# Patient Record
Sex: FEMALE | Employment: UNEMPLOYED | URBAN - METROPOLITAN AREA
[De-identification: names, ages, dates, MRNs, and addresses within clinical notes are randomized per-mention and may not be internally consistent; named-entity substitution may affect disease eponyms.]

---

## 2017-03-02 ENCOUNTER — OFFICE VISIT (OUTPATIENT)
Dept: PEDIATRICS | Facility: CLINIC | Age: 57
End: 2017-03-02
Payer: COMMERCIAL

## 2017-03-02 VITALS
OXYGEN SATURATION: 98 % | HEART RATE: 76 BPM | WEIGHT: 256 LBS | TEMPERATURE: 98.6 F | DIASTOLIC BLOOD PRESSURE: 82 MMHG | HEIGHT: 66 IN | BODY MASS INDEX: 41.14 KG/M2 | SYSTOLIC BLOOD PRESSURE: 118 MMHG

## 2017-03-02 DIAGNOSIS — J30.9 ALLERGIC RHINITIS, UNSPECIFIED ALLERGIC RHINITIS TRIGGER, UNSPECIFIED RHINITIS SEASONALITY: ICD-10-CM

## 2017-03-02 DIAGNOSIS — E66.01 MORBID OBESITY, UNSPECIFIED OBESITY TYPE (H): ICD-10-CM

## 2017-03-02 DIAGNOSIS — J45.30 MILD PERSISTENT ASTHMA WITHOUT COMPLICATION: ICD-10-CM

## 2017-03-02 DIAGNOSIS — K21.9 GASTROESOPHAGEAL REFLUX DISEASE, ESOPHAGITIS PRESENCE NOT SPECIFIED: ICD-10-CM

## 2017-03-02 DIAGNOSIS — K43.9 VENTRAL HERNIA WITHOUT OBSTRUCTION OR GANGRENE: ICD-10-CM

## 2017-03-02 DIAGNOSIS — E55.9 VITAMIN D DEFICIENCY: ICD-10-CM

## 2017-03-02 DIAGNOSIS — L50.9 URTICARIA: ICD-10-CM

## 2017-03-02 DIAGNOSIS — Z00.00 ROUTINE GENERAL MEDICAL EXAMINATION AT A HEALTH CARE FACILITY: Primary | ICD-10-CM

## 2017-03-02 DIAGNOSIS — R11.0 NAUSEA: ICD-10-CM

## 2017-03-02 DIAGNOSIS — I49.3 PVC'S (PREMATURE VENTRICULAR CONTRACTIONS): ICD-10-CM

## 2017-03-02 DIAGNOSIS — Z91.018 ALLERGY, FOOD: ICD-10-CM

## 2017-03-02 LAB
ALBUMIN SERPL-MCNC: 3.5 G/DL (ref 3.4–5)
ALP SERPL-CCNC: 95 U/L (ref 40–150)
ALT SERPL W P-5'-P-CCNC: 30 U/L (ref 0–50)
ANION GAP SERPL CALCULATED.3IONS-SCNC: 7 MMOL/L (ref 3–14)
AST SERPL W P-5'-P-CCNC: 15 U/L (ref 0–45)
BILIRUB SERPL-MCNC: 0.6 MG/DL (ref 0.2–1.3)
BUN SERPL-MCNC: 12 MG/DL (ref 7–30)
CALCIUM SERPL-MCNC: 9.4 MG/DL (ref 8.5–10.1)
CHLORIDE SERPL-SCNC: 106 MMOL/L (ref 94–109)
CHOLEST SERPL-MCNC: 165 MG/DL
CO2 SERPL-SCNC: 28 MMOL/L (ref 20–32)
CORTIS SERPL-MCNC: 6 UG/DL (ref 4–22)
CREAT SERPL-MCNC: 0.73 MG/DL (ref 0.52–1.04)
DEPRECATED CALCIDIOL+CALCIFEROL SERPL-MC: 55 UG/L (ref 20–75)
ERYTHROCYTE [DISTWIDTH] IN BLOOD BY AUTOMATED COUNT: 13 % (ref 10–15)
GFR SERPL CREATININE-BSD FRML MDRD: 82 ML/MIN/1.7M2
GLUCOSE SERPL-MCNC: 106 MG/DL (ref 70–99)
HBA1C MFR BLD: 5.3 % (ref 4.3–6)
HCT VFR BLD AUTO: 39.8 % (ref 35–47)
HCV AB SERPL QL IA: NORMAL
HDLC SERPL-MCNC: 35 MG/DL
HGB BLD-MCNC: 12.9 G/DL (ref 11.7–15.7)
LDLC SERPL CALC-MCNC: 108 MG/DL
MCH RBC QN AUTO: 27 PG (ref 26.5–33)
MCHC RBC AUTO-ENTMCNC: 32.4 G/DL (ref 31.5–36.5)
MCV RBC AUTO: 83 FL (ref 78–100)
NONHDLC SERPL-MCNC: 130 MG/DL
PLATELET # BLD AUTO: 276 10E9/L (ref 150–450)
POTASSIUM SERPL-SCNC: 4 MMOL/L (ref 3.4–5.3)
PROT SERPL-MCNC: 7.2 G/DL (ref 6.8–8.8)
RBC # BLD AUTO: 4.78 10E12/L (ref 3.8–5.2)
SODIUM SERPL-SCNC: 141 MMOL/L (ref 133–144)
TRIGL SERPL-MCNC: 111 MG/DL
TSH SERPL DL<=0.005 MIU/L-ACNC: 2 MU/L (ref 0.4–4)
WBC # BLD AUTO: 6.4 10E9/L (ref 4–11)

## 2017-03-02 PROCEDURE — 83036 HEMOGLOBIN GLYCOSYLATED A1C: CPT | Performed by: PEDIATRICS

## 2017-03-02 PROCEDURE — 80053 COMPREHEN METABOLIC PANEL: CPT | Performed by: PEDIATRICS

## 2017-03-02 PROCEDURE — 85027 COMPLETE CBC AUTOMATED: CPT | Performed by: PEDIATRICS

## 2017-03-02 PROCEDURE — 86803 HEPATITIS C AB TEST: CPT | Performed by: PEDIATRICS

## 2017-03-02 PROCEDURE — 99213 OFFICE O/P EST LOW 20 MIN: CPT | Mod: 25 | Performed by: PEDIATRICS

## 2017-03-02 PROCEDURE — 82306 VITAMIN D 25 HYDROXY: CPT | Performed by: PEDIATRICS

## 2017-03-02 PROCEDURE — 99396 PREV VISIT EST AGE 40-64: CPT | Performed by: PEDIATRICS

## 2017-03-02 PROCEDURE — 80061 LIPID PANEL: CPT | Performed by: PEDIATRICS

## 2017-03-02 PROCEDURE — 84443 ASSAY THYROID STIM HORMONE: CPT | Performed by: PEDIATRICS

## 2017-03-02 PROCEDURE — 82533 TOTAL CORTISOL: CPT | Performed by: PEDIATRICS

## 2017-03-02 PROCEDURE — 36415 COLL VENOUS BLD VENIPUNCTURE: CPT | Performed by: PEDIATRICS

## 2017-03-02 RX ORDER — ONDANSETRON 4 MG/1
4 TABLET, ORALLY DISINTEGRATING ORAL EVERY 6 HOURS PRN
Qty: 20 TABLET | Refills: 11 | Status: SHIPPED | OUTPATIENT
Start: 2017-03-02 | End: 2018-03-12

## 2017-03-02 RX ORDER — DILTIAZEM HYDROCHLORIDE 180 MG/1
180 CAPSULE, EXTENDED RELEASE ORAL DAILY
Qty: 30 CAPSULE | Refills: 5 | Status: SHIPPED | OUTPATIENT
Start: 2017-03-02 | End: 2017-05-01

## 2017-03-02 RX ORDER — ALBUTEROL SULFATE 0.83 MG/ML
1 SOLUTION RESPIRATORY (INHALATION) EVERY 4 HOURS PRN
Qty: 90 VIAL | Refills: 3 | Status: SHIPPED | OUTPATIENT
Start: 2017-03-02 | End: 2019-03-14

## 2017-03-02 NOTE — MR AVS SNAPSHOT
After Visit Summary   3/2/2017    Stephanie Mendez    MRN: [id number]           Patient Information     Date Of Birth          1960        Visit Information        Provider Department      3/2/2017 8:40 AM Nancy Urban MD [facility] [place]        Today's Diagnoses     Routine general medical examination at a health care facility    -  1    Mild persistent asthma without complication        Allergic rhinitis, unspecified allergic rhinitis trigger, unspecified rhinitis seasonality        Gastroesophageal reflux disease, esophagitis presence not specified        Urticaria        Morbid obesity, unspecified obesity type (H)        PVC's (premature ventricular contractions)        Vitamin D deficiency        Ventral hernia without obstruction or gangrene        Nausea        Mild persistent asthma with exacerbation          Care Instructions    Lab work today - I will send you the results through [employer] when I get them    Call for visit with Dr Young    Switch to diltiazem or PVC's.  Stop metoprolol.  Please keep me posted with how you are doing.      Tetanus shot due this year when it is convenient for you        Follow-ups after your visit        Additional Services     ENDOCRINOLOGY ADULT REFERRAL       Your provider has referred you to: [facility]: [facility] [place] [phone]   [email or web address]      Please be aware that coverage of these services is subject to the terms and limitations of your health insurance plan.  Call member services at your health plan with any benefit or coverage questions.      Please bring the following to your appointment:    >>   Any x-rays, CTs or MRIs which have been performed.  Contact the facility where they were done to arrange for  prior to your scheduled appointment.    >>   List of current medications   >>   This referral request   >>   Any documents/labs given to you for this referral                 "  Your next 10 appointments already scheduled     May 01, 2017 12:30 PM CDT   Return Visit with LEWIS Ho CNP   [facility] [place] ([facility])    [address] [place], [place] [id number]  [facility] [address] [phone]              Who to contact     If you have questions or need follow up information about today's clinic visit or your schedule please contact [facility] [place] directly at [phone].  Normal or non-critical lab and imaging results will be communicated to you by [email or web address], letter or phone within 4 business days after the clinic has received the results. If you do not hear from us within 7 days, please contact the clinic through [employer] or phone. If you have a critical or abnormal lab result, we will notify you by phone as soon as possible.  Submit refill requests through [employer] or call your pharmacy and they will forward the refill request to us. Please allow 3 business days for your refill to be completed.          Additional Information About Your Visit        [email or web address] Information     [employer] gives you secure access to your electronic health record. If you see a primary care provider, you can also send messages to your care team and make appointments. If you have questions, please call your primary care clinic.  If you do not have a primary care provider, please call [phone] and they will assist you.        Care EveryWhere ID     This is your Care EveryWhere ID. This could be used by other organizations to access your [facility] medical records  [id number]        Your Vitals Were     Pulse Temperature Height Last Period Pulse Oximetry BMI (Body Mass Index)    76 98.6  F (37  C) (Tympanic) 5' 5.5\" (1.664 m) 06/04/2010 98% 41.95 kg/m2       Blood Pressure from Last 3 Encounters:   03/02/17 118/82   12/16/16 126/88   09/29/16 148/73    Weight from Last 3 Encounters:   03/02/17 256 lb (116.1 kg)   12/16/16 256 lb (116.1 kg)   09/29/16 247 lb " (112 kg)              We Performed the Following     CBC with platelets     Comprehensive metabolic panel     Cortisol     DEPRESSION ACTION PLAN (DAP)     ENDOCRINOLOGY ADULT REFERRAL     Hemoglobin A1c     Hepatitis C Screen Reflex to HCV RNA Quant and Genotype     Lipid panel reflex to direct LDL     TSH with free T4 reflex     Vitamin D Deficiency          Today's Medication Changes          These changes are accurate as of: 3/2/17  9:26 AM.  If you have any questions, ask your nurse or doctor.               Start taking these medicines.        Dose/Directions    diltiazem 180 MG 24 hr capsule   Commonly known as:  DILACOR XR   Used for:  PVC's (premature ventricular contractions)   Started by:  Nancy Urban MD        Dose:  180 mg   Take 1 capsule (180 mg) by mouth daily   Quantity:  30 capsule   Refills:  5         Stop taking these medicines if you haven't already. Please contact your care team if you have questions.     metoprolol 50 MG 24 hr tablet   Commonly known as:  TOPROL XL   Stopped by:  Nancy Urban MD                Where to get your medicines      These medications were sent to [facility]/pharmacy #[id number] - [place], [place] - [address] AT [place] [address], [place] [address]     Phone:  [phone]     albuterol (2.5 MG/3ML) 0.083% neb solution    diltiazem 180 MG 24 hr capsule    ondansetron 4 MG ODT tab                Primary Care Provider Office Phone # Fax #    Nancy Urban [phone]       [address] [place] [address]        Thank you!     Thank you for choosing [facility]  for your care. Our goal is always to provide you with excellent care. Hearing back from our patients is one way we can continue to improve our services. Please take a few minutes to complete the written survey that you may receive in the mail after your visit with us. Thank you!             Your  Updated Medication List - Protect others around you: Learn how to safely use, store and throw away your medicines at www.disposemymeds.org.          This list is accurate as of: 3/2/17  9:26 AM.  Always use your most recent med list.                   Brand Name Dispense Instructions for use    acetaminophen 500 MG tablet    TYLENOL    100 tablet    Take 1 tablet (500 mg) by mouth every 6 hours as needed for mild pain       albuterol (2.5 MG/3ML) 0.083% neb solution     90 vial    Take 1 vial (2.5 mg) by nebulization every 4 hours as needed for shortness of breath / dyspnea or wheezing       albuterol 90 MCG/ACT inhaler     1    1-2 puffs Q 4-6 hrs prn       BENADRYL 25 MG capsule   Generic drug:  diphenhydrAMINE      Take 25 mg by mouth every 6 hours as needed.       CALCIUM + D PO      600mg bid       CENTRUM Tabs      1 TABLET DAILY       diltiazem 180 MG 24 hr capsule    DILACOR XR    30 capsule    Take 1 capsule (180 mg) by mouth daily       doxepin 10 MG capsule    SINEquan     2 tablets twice daily       EPI E-Z PEN REMEDIOS 1:1000  IJ     2    1 TIME ONLY       ibuprofen 600 MG tablet    ADVIL/MOTRIN    30 tablet    Take 1 tablet by mouth every 6 hours as needed for other (inflammatory pain).       IRON RELEASE TBCR 160 MG OR      1 TABLET DAILY       omeprazole 20 MG CR capsule    priLOSEC    180 capsule    Take 1 capsule (20 mg) by mouth 2 times daily       ondansetron 4 MG ODT tab    ZOFRAN-ODT    20 tablet    Take 1 tablet (4 mg) by mouth every 6 hours as needed for nausea       PATANOL 0.1 % ophthalmic solution   Generic drug:  olopatadine      Place 2 drops into both eyes daily as needed       ranitidine 150 MG tablet    ZANTAC    3 MONTHS    ONE TABLET TWICE DAILY       SINGULAIR 10 MG tablet   Generic drug:  montelukast      Take 10 mg by mouth daily       Vitamin D (Cholecalciferol) 1000 UNITS Tabs      Take by mouth daily       XOLAIR SC      Twice per month

## 2017-03-02 NOTE — LETTER
My Depression Action Plan  Name: Stephanie Mendez   Date of Birth 1960  Date: 3/2/2017    My doctor: Nancy Urban   My clinic: [address]  [address]  [facility] [address] [phone]          GREEN    ZONE   Good Control    What it looks like:     Things are going generally well. You have normal up s and down s. You may even feel depressed from time to time, but bad moods usually last less than a day.   What you need to do:  1. Continue to care for yourself (see self care plan)  2. Check your depression survival kit and update it as needed  3. Follow your physician s recommendations including any medication.  4. Do not stop taking medication unless you consult with your physician first.           YELLOW         ZONE Getting Worse    What it looks like:     Depression is starting to interfere with your life.     It may be hard to get out of bed; you may be starting to isolate yourself from others.    Symptoms of depression are starting to last most all day and this has happened for several days.     You may have suicidal thoughts but they are not constant.   What you need to do:     1. Call your care team, your response to treatment will improve if you keep your care team informed of your progress. Yellow periods are signs an adjustment may need to be made.     2. Continue your self-care, even if you have to fake it!    3. Talk to someone in your support network    4. Open up your depression survival kit           RED    ZONE Medical Alert - Get Help    What it looks like:     Depression is seriously interfering with your life.     You may experience these or other symptoms: You can t get out of bed most days, can t work or engage in other necessary activities, you have trouble taking care of basic hygiene, or basic responsibilities, thoughts of suicide or death that will not go away, self-injurious behavior.     What you need to do:  1. Call  your care team and request a same-day appointment. If they are not available (weekends or after hours) call your local crisis line, emergency room or 911.      Electronically signed by: Ngozi Carrera, March 2, 2017    Depression Self Care Plan / Survival Kit    Self-Care for Depression  Here s the deal. Your body and mind are really not as separate as most people think.  What you do and think affects how you feel and how you feel influences what you do and think. This means if you do things that people who feel good do, it will help you feel better.  Sometimes this is all it takes.  There is also a place for medication and therapy depending on how severe your depression is, so be sure to consult with your medical provider and/ or Behavioral Health Consultant if your symptoms are worsening or not improving.     In order to better manage my stress, I will:    Exercise  Get some form of exercise, every day. This will help reduce pain and release endorphins, the  feel good  chemicals in your brain. This is almost as good as taking antidepressants!  This is not the same as joining a gym and then never going! (they count on that by the way ) It can be as simple as just going for a walk or doing some gardening, anything that will get you moving.      Hygiene   Maintain good hygiene (Get out of bed in the morning, Make your bed, Brush your teeth, Take a shower, and Get dressed like you were going to work, even if you are unemployed).  If your clothes don't fit try to get ones that do.    Diet  I will strive to eat foods that are good for me, drink plenty of water, and avoid excessive sugar, caffeine, alcohol, and other mood-altering substances.  Some foods that are helpful in depression are: complex carbohydrates, B vitamins, flaxseed, fish or fish oil, fresh fruits and vegetables.    Psychotherapy  I agree to participate in Individual Therapy (if recommended).    Medication  If prescribed medications, I agree  to take them.  Missing doses can result in serious side effects.  I understand that drinking alcohol, or other illicit drug use, may cause potential side effects.  I will not stop my medication abruptly without first discussing it with my provider.    Staying Connected With Others  I will stay in touch with my friends, family members, and my primary care provider/team.    Use your imagination  Be creative.  We all have a creative side; it doesn t matter if it s oil painting, sand castles, or mud pies! This will also kick up the endorphins.    Witness Beauty  (AKA stop and smell the roses) Take a look outside, even in mid-winter. Notice colors, textures. Watch the squirrels and birds.     Service to others  Be of service to others.  There is always someone else in need.  By helping others we can  get out of ourselves  and remember the really important things.  This also provides opportunities for practicing all the other parts of the program.    Humor  Laugh and be silly!  Adjust your TV habits for less news and crime-drama and more comedy.    Control your stress  Try breathing deep, massage therapy, biofeedback, and meditation. Find time to relax each day.     My support system    Clinic Contact:  Phone number:    Contact 1:  Phone number:    Contact 2:  Phone number:    Yarsani/:  Phone number:    Therapist:  Phone number:    Local crisis center:    Phone number:    Other community support:  Phone number:

## 2017-03-02 NOTE — NURSING NOTE
"Chief Complaint   Patient presents with     Physical       Initial /82 (BP Location: Right arm, Patient Position: Chair, Cuff Size: Adult Large)  Pulse 76  Temp 98.6  F (37  C) (Tympanic)  Ht 5' 5.5\" (1.664 m)  Wt 256 lb (116.1 kg)  LMP 06/04/2010  SpO2 98%  BMI 41.95 kg/m2 Estimated body mass index is 41.95 kg/(m^2) as calculated from the following:    Height as of this encounter: 5' 5.5\" (1.664 m).    Weight as of this encounter: 256 lb (116.1 kg).  Medication Reconciliation: complete  "

## 2017-03-02 NOTE — PROGRESS NOTES
SUBJECTIVE:     CC: Stephanie Mendez is an 56 year old woman who presents for preventive health visit.     Healthy Habits:    Do you get at least three servings of calcium containing foods daily (dairy, green leafy vegetables, etc.)? yes    Amount of exercise or daily activities, outside of work: 3 day(s) per week    Problems taking medications regularly No    Medication side effects: No    Have you had an eye exam in the past two years? yes    Do you see a dentist twice per year? yes    Do you have sleep apnea, excessive snoring or daytime drowsiness?no      - weight     Today's PHQ-2 Score:   PHQ-2 ( 1999 [employer]) 3/2/2017 3/7/2016   Q1: Little interest or pleasure in doing things 0 0   Q2: Feeling down, depressed or hopeless 0 0   PHQ-2 Score 0 0       Abuse: Current or Past(Physical, Sexual or Emotional)- No  Do you feel safe in your environment - Yes    Social History   Substance Use Topics     Smoking status: Never Smoker     Smokeless tobacco: Never Used     Alcohol use No      Comment: RARE     The patient does not drink >3 drinks per day nor >7 drinks per week.    Recent Labs   Lab Test  11/19/15   0853  01/14/14   0956  01/28/13   1339   CHOL  163  154  179   HDL  36*  32*  43*   LDL  106*  100  116   TRIG  103  107  101   CHOLHDLRATIO   --   4.8  4.2   NHDL  127   --    --        Reviewed orders with patient.  Reviewed health maintenance and updated orders accordingly - Yes    Mammo Decision Support:  Patient over age 50, mutual decision to screen reflected in health maintenance.    Pertinent mammograms are reviewed under the imaging tab.  History of abnormal Pap smear: Status post benign hysterectomy. Health Maintenance and Surgical History updated.    Reviewed and updated as needed this visit by clinical staff  Tobacco  Allergies  Meds  Med Hx  Surg Hx  Fam Hx  Soc Hx        Reviewed and updated as needed this visit by Provider        Chronic urticaria, angioedema, and hx of anaphylaxis -  multiple triggers - gets hives and swelling in lower right leg - strong family history.  Diagnosed at age 18. Followed closely by Dr Mercado.    Carries epi pen     Can't eat fresh fruit, vegetables, onion, chicken, basil, cod, sunflower seeds    Regular diet - cornflakes, cow's milk, kid's choice bread (can only have white), turkey or hot dogs, applesauce, skinny cow ice cream snack, dinner - a protein and cooked veggie (can sometimes tolerate frozen carrots or green beans)    Vitamin D - taking 3400 daily - hx of deficiency.    PVCs - followed by cardiology - last saw Dr Pierre in December.  Feels that PVCs are getting more noticeable.  No new cardiac symptoms - denies chest pain, dizziness, changes in swelling (mostly related to urticaria).   Worried that her epinephrine won't be effective if she is on metoprolol.  Interested in trial of CCB as mentioned by Dr Pierre at last visit.    Has been trying to lose weight - worried about obesity.  Diet limited due to multiple food allergies.  Has elliptical machine and exercise ball at home.  Enjoys walking.  Caregiving for her mother and her two adult autistic children - high stress level, but handles it fairly well.  Is able to leave the boys unsupervised for short periods of time.      Still improving after extensive abdominal surgery for removal of hepatic mass over one year ago.  Scar no longer tender - affording her more exercise choices.    Asthma - has been under reasonable control on current regimen.    GERD - requires PPI twice daily to control symptoms.    Ventral hernia - known - no symptoms to suggest incarceration or bowel obstruction.    Intermittent nausea - treats with zofran.    Small amount of colon left after past excisions - gets diarrhea often when reacting to food.  UTD on colon cancer screenings - now requires only sigmoidoscopy due to past excisions.       ROS: 10 point ROS neg other than the symptoms noted above in the HPI.      Problem list,  "Medication list, Allergies, and Medical/Social/Surgical histories reviewed in [facility] and updated as appropriate.  OBJECTIVE:     /82 (BP Location: Right arm, Patient Position: Chair, Cuff Size: Adult Large)  Pulse 76  Temp 98.6  F (37  C) (Tympanic)  Ht 5' 5.5\" (1.664 m)  Wt 256 lb (116.1 kg)  LMP 06/04/2010  SpO2 98%  BMI 41.95 kg/m2  EXAM:  GENERAL: healthy, alert and no distress  EYES: Eyes grossly normal to inspection, PERRL and conjunctivae and sclerae normal  HENT: ear canals and TM's normal, nose and mouth without ulcers or lesions  NECK: no adenopathy, no asymmetry, masses, or scars and thyroid normal to palpation  RESP: lungs clear to auscultation - no rales, rhonchi or wheezes  BREAST: normal without masses, tenderness or nipple discharge and no palpable axillary masses or adenopathy  CV: regular rates and rhythm, normal S1 S2, no S3 or S4, grade 2/6 systolic murmur heard best over the LUSB and peripheral pulses strong  ABDOMEN: +BS, soft, well healed large incision site, no masses, nontender, nondistended  MS: no gross musculoskeletal defects noted, no edema  SKIN: scattered hives, swollen skin right lower shin  NEURO: Normal strength and tone, mentation intact and speech normal  PSYCH: mentation appears normal, affect normal/bright    ASSESSMENT/PLAN:         ICD-10-CM    1. Routine general medical examination at a health care facility Z00.00 CBC with platelets     TSH with free T4 reflex     Comprehensive metabolic panel     Lipid panel reflex to direct LDL     Hepatitis C Screen Reflex to HCV RNA Quant and Genotype   2. Morbid obesity, unspecified obesity type (H) E66.01 Cortisol     Vitamin D Deficiency     Hemoglobin A1c     ENDOCRINOLOGY ADULT REFERRAL    Recommended visit with endocrinology to discuss possible use of medications to treat obesity.  Patient's diet is limited due to numerous allergies, she is continuing to work on increasing activity - improved now that her residual abdominal " discomfort after surgery has resolved   3. PVC's (premature ventricular contractions) I49.3 diltiazem (DILACOR XR) 180 MG 24 hr capsule  Symptomatic.   Has been followed by cardiology - discussed changing to CCB from BB due to concerns about her asthma/allergies/anaphylaxis history.  Plan to transition to diltiazem from metoprolol.  Patient to keep me updated with how this is going - plans to dose at night as PVC awareness is higher in evening/night hours   4. Mild persistent asthma without complication J45.30 albuterol (2.5 MG/3ML) 0.083% neb solution  Continue current chronic treatment plan as recommended by Dr Mercado   5. Allergic rhinitis, unspecified allergic rhinitis trigger, unspecified rhinitis seasonality J30.9 Continue current allergy plan   6. Gastroesophageal reflux disease, esophagitis presence not specified K21.9 Continue PPI - requires BID dosing    7. Urticaria L50.9 glucagon 1 MG kit - prescribed by Dr Mercado.  Severe chronic idiopathic urticaria - some triggers identified - still discovering others   8. Vitamin D deficiency E55.9 Recheck level today   9. Ventral hernia without obstruction or gangrene K43.9 ondansetron (ZOFRAN-ODT) 4 MG ODT tab   10. Nausea R11.0 ondansetron (ZOFRAN-ODT) 4 MG ODT tab  Well controlled, continue current medications     11. Allergy, food Z91.018 Multiple allergies       COUNSELING:   Special attention given to:        Regular exercise       Healthy diet/nutrition       Vision screening       Osteoporosis Prevention/Bone Health       Colon cancer screening       Consider Hep C screening for patients born between 1945 and 1965       (Grecia)menopause management       The 10-year ASCVD risk score (Tranmiryam IGNACIO Jr., et al., 2013) is: 3.6%    Values used to calculate the score:      Age: 56 years      Sex: Female      Is Non- : No      Diabetic: No      Tobacco smoker: No      Systolic Blood Pressure: 118 mmHg      Is Prescribed Antihypertensives: Yes       "HDL Cholesterol: 36 mg/dL      Total Cholesterol: 163 mg/dL    BP Screening:   Last 3 BP Readings:    BP Readings from Last 3 Encounters:   03/02/17 118/82   12/16/16 126/88   09/29/16 148/73       The following was recommended to the patient:  Re-screen BP within a year and recommended lifestyle modifications     reports that she has never smoked. She has never used smokeless tobacco.    Estimated body mass index is 41.95 kg/(m^2) as calculated from the following:    Height as of this encounter: 5' 5.5\" (1.664 m).    Weight as of this encounter: 256 lb (116.1 kg).   Weight management plan: Patient referred to endocrine and/or weight management specialty    Counseling Resources:  ATP IV Guidelines  Pooled Cohorts Equation Calculator  Breast Cancer Risk Calculator  FRAX Risk Assessment  ICSI Preventive Guidelines  Dietary Guidelines for Americans, 2010  USDA's MyPlate  ASA Prophylaxis  Lung CA Screening    Additional 15 minutes spent in discussion of asthma, food allergies, PVCs, obesity in addition to RHM.    Nancy Urban MD  [facility] [place]  "

## 2017-03-02 NOTE — PATIENT INSTRUCTIONS
Lab work today - I will send you the results through [facility] when I get them    Call for visit with Dr Bashirkar    Switch to diltiazem or PVC's.  Stop metoprolol.  Please keep me posted with how you are doing.      Tetanus shot due this year when it is convenient for you

## 2017-03-03 ASSESSMENT — ASTHMA QUESTIONNAIRES: ACT_TOTALSCORE: 25

## 2017-03-28 ENCOUNTER — OFFICE VISIT (OUTPATIENT)
Dept: ENDOCRINOLOGY | Facility: CLINIC | Age: 57
End: 2017-03-28
Payer: COMMERCIAL

## 2017-03-28 VITALS
BODY MASS INDEX: 43.32 KG/M2 | WEIGHT: 260 LBS | HEART RATE: 84 BPM | HEIGHT: 65 IN | TEMPERATURE: 97.1 F | DIASTOLIC BLOOD PRESSURE: 78 MMHG | SYSTOLIC BLOOD PRESSURE: 120 MMHG | OXYGEN SATURATION: 97 %

## 2017-03-28 DIAGNOSIS — E66.01 MORBID OBESITY, UNSPECIFIED OBESITY TYPE (H): Primary | ICD-10-CM

## 2017-03-28 PROCEDURE — 99204 OFFICE O/P NEW MOD 45 MIN: CPT | Performed by: INTERNAL MEDICINE

## 2017-03-28 PROCEDURE — 93010 ELECTROCARDIOGRAM REPORT: CPT | Performed by: INTERNAL MEDICINE

## 2017-03-28 RX ORDER — ESOMEPRAZOLE MAGNESIUM 40 MG/1
40 CAPSULE, DELAYED RELEASE ORAL
Qty: 30 CAPSULE | Refills: 1 | COMMUNITY
Start: 2017-03-28 | End: 2018-03-12

## 2017-03-28 RX ORDER — PHENTERMINE HYDROCHLORIDE 15 MG/1
15 CAPSULE ORAL EVERY MORNING
Qty: 30 CAPSULE | Refills: 3 | Status: SHIPPED | OUTPATIENT
Start: 2017-03-28 | End: 2018-03-12

## 2017-03-28 NOTE — MR AVS SNAPSHOT
After Visit Summary   3/28/2017    Stephanie Mendez    MRN: [id number]           Patient Information     Date Of Birth          1960        Visit Information        Provider Department      3/28/2017 2:00 PM Delicia Young MD [facility]        Today's Diagnoses     Morbid obesity, unspecified obesity type (H)    -  1      Care Instructions    [facility]   Dr Young, Endocrin[facility]      [facility]   [address] [facility] [address]  Appointment [phone]  Fax: [phone]   Monday and Tuesday         [address] [place] [address]  Appointment [phone]  Fax: [phone]  Wednesday and Thursday           EKG today  Will start on Phentermine 15 mg /day. ll side effects including risk for HTN, palpitations, ischemic events, insomnia, CP, dry mouth, risk for valvular heart disease, restlessness etc.were discussed in detailed. There is also a abuse potential and patient was instructed to use the medication as prescribed.   The patient is advised to Make better food choices: reduce carbs, Reduce portion size, weight loss and exercise 3-4 times a week.  Follow up in 3 months            Follow-ups after your visit        Your next 10 appointments already scheduled     May 01, 2017 12:30 PM CDT   Return Visit with LEWIS Ho CNP   [facility]    [address] [place] [id number] [phone]              Who to contact     If you have questions or need follow up information about today's clinic visit or your schedule please contact [facility] directly at [phone].  Normal or non-critical lab and imaging results will be communicated to you by MyChart, letter or phone within 4 business days after the clinic has received  "the results. If you do not hear from us within 7 days, please contact the clinic through [employer] or phone. If you have a critical or abnormal lab result, we will notify you by phone as soon as possible.  Submit refill requests through Nettle or call your pharmacy and they will forward the refill request to us. Please allow 3 business days for your refill to be completed.          Additional Information About Your Visit        [employer] Information     Nettle gives you secure access to your electronic health record. If you see a primary care provider, you can also send messages to your care team and make appointments. If you have questions, please call your primary care clinic.  If you do not have a primary care provider, please call [phone] and they will assist you.        Care EveryWhere ID     This is your Care EveryWhere ID. This could be used by other organizations to access your [facility] medical records  [id number]        Your Vitals Were     Pulse Temperature Height Last Period Pulse Oximetry BMI (Body Mass Index)    84 97.1  F (36.2  C) (Oral) 1.657 m (5' 5.25\") 06/04/2010 97% 42.94 kg/m2       Blood Pressure from Last 3 Encounters:   03/28/17 120/78   03/02/17 118/82   12/16/16 126/88    Weight from Last 3 Encounters:   03/28/17 117.9 kg (260 lb)   03/02/17 116.1 kg (256 lb)   12/16/16 116.1 kg (256 lb)              Today, you had the following     No orders found for display         Today's Medication Changes          These changes are accurate as of: 3/28/17  2:44 PM.  If you have any questions, ask your nurse or doctor.               Stop taking these medicines if you haven't already. Please contact your care team if you have questions.     omeprazole 20 MG CR capsule   Commonly known as:  priLOSEC   Stopped by:  Delicia Young MD                    Primary Care Provider Office Phone # Fax #    Nancy Urban [phone]       [address] " [facility] [address] [place] [address]        Thank you!     Thank you for choosing [facility]  for your care. Our goal is always to provide you with excellent care. Hearing back from our patients is one way we can continue to improve our services. Please take a few minutes to complete the written survey that you may receive in the mail after your visit with us. Thank you!             Your Updated Medication List - Protect others around you: Learn how to safely use, store and throw away your medicines at www.disposemymeds.org.          This list is accurate as of: 3/28/17  2:44 PM.  Always use your most recent med list.                   Brand Name Dispense Instructions for use    acetaminophen 500 MG tablet    TYLENOL    100 tablet    Take 1 tablet (500 mg) by mouth every 6 hours as needed for mild pain       albuterol (2.5 MG/3ML) 0.083% neb solution     90 vial    Take 1 vial (2.5 mg) by nebulization every 4 hours as needed for shortness of breath / dyspnea or wheezing       albuterol 90 MCG/ACT inhaler     1    1-2 puffs Q 4-6 hrs prn       BENADRYL 25 MG capsule   Generic drug:  diphenhydrAMINE      Take 25 mg by mouth every 6 hours as needed.       CALCIUM + D PO      600mg bid       CENTRUM Tabs      1 TABLET DAILY       diltiazem 180 MG 24 hr capsule    DILACOR XR    30 capsule    Take 1 capsule (180 mg) by mouth daily       doxepin 10 MG capsule    SINEquan     2 tablets twice daily       EPI E-Z PEN REMEDIOS 1:1000  IJ     2    1 TIME ONLY       glucagon 1 MG kit     1 mg    Inject 1 mg into the muscle once for 1 dose       ibuprofen 600 MG tablet    ADVIL/MOTRIN    30 tablet    Take 1 tablet by mouth every 6 hours as needed for other (inflammatory pain).       IRON RELEASE TBCR 160 MG OR      1 TABLET DAILY       nexIUM 40 MG CR capsule   Generic drug:  esomeprazole     30 capsule    Take 1 capsule (40 mg) by mouth every morning (before breakfast) Take 30-60 minutes before a eating.       ondansetron 4 MG ODT  tab    ZOFRAN-ODT    20 tablet    Take 1 tablet (4 mg) by mouth every 6 hours as needed for nausea       PATANOL 0.1 % ophthalmic solution   Generic drug:  olopatadine      Place 2 drops into both eyes daily as needed       ranitidine 150 MG tablet    ZANTAC    3 MONTHS    ONE TABLET TWICE DAILY       SINGULAIR 10 MG tablet   Generic drug:  montelukast      Take 10 mg by mouth daily       Vitamin D (Cholecalciferol) 1000 UNITS Tabs      Take by mouth daily       XOLAIR SC      Twice per month

## 2017-03-28 NOTE — PROGRESS NOTES
Name: Stephanie Mendez  Seen at the request of No ref. provider found for obesity.  HPI:  Stephanie Mendez is a 56 year old female who presents for the evaluation of obestiy.           Initial visit     Previous visit       Current   weight       260 lbs 0 oz     BMI   Body mass index is 42.94 kg/(m^2).     Waist circumference          As child and growing up- on higher side of weight  Last time in ' normal ' wt - 29 years back  Then gradual wt gain after delivery.last pregnancy was 23 years back.  Did not attain prepregnancy wt after delivery and started wt gain following that.  1/2016: 245  3/2016: 233 ( had surgery) - partial hepatectomy for biliary cysts  9/2016: 247  12/2016: 256  3/2017: 260    Tries to watch what she is eating.  H/o of multiple allergies (h/o angioedema)  Food choices are limited.  H/o colon removal- partial colectomy   Under stress.    Menses: menopause s/p hysterectomy ( ovaries removed)  Diarrhea/Constipation:No  Changes in Hair or Skin:No  Diabetes:No  Sleep: on and off  Sleep Apnea/Snores:No  Hypertension:No  Hyperlipidemia:No  Hirsutism:facial hair  Easy Brusing:No  Use of Steroids:on and off for hives  Family history of Obesity:mom- obese  Diet: tries to watch what she is eating  Exercise:2-3 times a week. walk on treadmill 20 min-30 min  PMH/PSH:  Past Medical History:   Diagnosis Date     Allergic rhinitis, cause unspecified      Allergy, food      Anemia, unspecified      Chronic idiopathic urticaria      Chronic maxillary sinusitis     recurrent fungal infection (remote)     Esophageal reflux      Irregular heart beat      Mild intermittent asthma      Murmur, heart     12/1/2015 3/6 systolic murmur heard - echo ordered     Personal history of other malignant neoplasm of skin     Basal cell     PONV (postoperative nausea and vomiting)      PVC (premature ventricular contraction) 12/2015 12/2015 Holter - SR (HR ) with PVC's and SVPB's     Past Surgical History:    Procedure Laterality Date     C NONSPECIFIC PROCEDURE  1998    hemorrhoidectomy     C NONSPECIFIC PROCEDURE  82    salivary gland resection rt side     C NONSPECIFIC PROCEDURE  1998    hemorrhoidectomy     C NONSPECIFIC PROCEDURE  2000    sinus surgery     HC ABLATION, ENDOMETRIAL, THERMAL, W/O HYSTEROSCOPIC GUIDANCE  7/10     HEPATECTOMY PARTIAL Left 2/16/2016    Procedure: HEPATECTOMY PARTIAL;  Surgeon: Kevin Howard MD;  Location: [facility] OR     HYSTERECTOMY, PAP NO LONGER INDICATED  8/2010     LAPAROSCOPIC HEPATECTOMY PARTIAL Left 2/16/2016    Procedure: LAPAROSCOPIC HEPATECTOMY PARTIAL;  Surgeon: Kevin Howard MD;  Location: [facility] OR     LAPAROSCOPY DIAGNOSTIC (GENERAL) N/A 2/16/2016    Procedure: LAPAROSCOPY DIAGNOSTIC (GENERAL);  Surgeon: Kevin Howard MD;  Location: [facility] OR     RECONSTRUCT ABDOMINAL WALL  10/23/2012    Procedure: RECONSTRUCT ABDOMINAL WALL;  ABDOMINAL WALL RECONSTRUCTION WITH MESH ;  Surgeon: Romi Renteria MD;  Location: [facility] OR     SALPINGO OOPHORECTOMY,R/L/FAUSTINA      Salpingo Oophorectomy, RT/LT/FAUSTINA     SURGICAL HISTORY OF -       partial colectomy (3/4 colon)     Family Hx:  Family History   Problem Relation Age of Onset     Cardiovascular Maternal Grandmother      CHF     DIABETES Maternal Grandmother      OSTEOPOROSIS Maternal Grandmother      DIABETES Mother      Hypertension Mother      Lipids Mother      Breast Cancer Mother      CANCER Mother 82     DX this year, 2015- breast cancer     Alzheimer Disease Mother      CEREBROVASCULAR DISEASE Paternal Grandmother      CANCER Father      Skin cancer     DIABETES Maternal Aunt      Cancer - colorectal Maternal Aunt      Thyroid Disease No family hx of      C.A.D. No family hx of              Social Hx:  Social History     Social History     Marital status:      Spouse name: Ritesh     Number of children: 2     Years of education: 14     Occupational History     Homemaker      Social History Main Topics     Smoking status: Never Smoker      "Smokeless tobacco: Never Used     Alcohol use No      Comment: RARE     Drug use: No     Sexual activity: Yes     Partners: Male     Birth control/ protection: Pill, Surgical      Comment:  had vasectomy     Other Topics Concern     Parent/Sibling W/ Cabg, Mi Or Angioplasty Before 65f 55m? No     Caffeine Concern No     Sleep Concern No     Special Diet Yes     allergy related      Exercise No     some walking      Seat Belt Yes     Social History Narrative    .  Lives with .  2 sons, both with autism.  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           MEDICATIONS:  has a current medication list which includes the following prescription(s): esomeprazole, ondansetron, albuterol, diltiazem, glucagon, acetaminophen, vitamin d (cholecalciferol), omalizumab, doxepin, diphenhydramine, ibuprofen, olopatadine, montelukast, ranitidine, epinephrine hcl (anaphylaxis), albuterol, calcium citrate-vitamin d, centrum, and ferrous sulfate dried.    ROS     ROS: 10 point ROS neg other than the symptoms noted above in the HPI.    Physical Exam   VS: /78 (BP Location: Right arm, Patient Position: Chair, Cuff Size: Adult Large)  Pulse 84  Temp 97.1  F (36.2  C) (Oral)  Ht 1.657 m (5' 5.25\")  Wt 117.9 kg (260 lb)  LMP 06/04/2010  SpO2 97%  BMI 42.94 kg/m2  GENERAL: AXOX3, NAD, well dressed, answering questions appropriately, appears stated age.  HEENT: OP clear, no LAD, no TM, non-tender, no exopthalmous, no proptosis, EOMI, no lig lag, no retraction  NECK: Thyroid normal in size, non tender, no nodules were palpated.  CV: RRR, no rubs, gallops, no murmurs  LUNGS: CTAB, no wheezes, rales, or ronchi  ABDOMEN: soft, nontender, nondistended, +BS, no organomegaly. + scar marks. No hyperpigmented stretch marks.  EXTREMITIES: no edema, +pulses, no rashes, no lesions  NEUROLOGY: CN grossly intact, + DTR upper and lower extremity, no tremors  MSK: grossly intact  SKIN: no rashes, no lesions    LABS:  Last Basic " Metabolic Panel:  Lab Results   Component Value Date     03/02/2017      Lab Results   Component Value Date    POTASSIUM 4.0 03/02/2017     Lab Results   Component Value Date    CHLORIDE 106 03/02/2017     Lab Results   Component Value Date    VONDA 9.4 03/02/2017     Lab Results   Component Value Date    CO2 28 03/02/2017     Lab Results   Component Value Date    BUN 12 03/02/2017     Lab Results   Component Value Date    CR 0.73 03/02/2017     Lab Results   Component Value Date     03/02/2017       Lab Results   Component Value Date    TSH 2.00 03/02/2017         All pertinent notes, labs, and images personally reviewed by me.     A/P  Ms.Dianne MARTI Mendez is a 56 year old here for the evaluation of obestiy:    1. Obesity-    Body mass index is 42.94 kg/(m^2).  Obesity is associated with a significant increase in mortality and risk of many disorders, including diabetes mellitus, hypertension, dyslipidemia, heart disease, stroke, sleep apnea, cancer, and many others. Conversely, weight loss is associated with a reduction in obesity-associated morbidity.  Endocrine evaluation of obesity includes Cushing's and thyroid dysfunction.   TSH normal  Morning cortisol normal. No DM, HTN or dyslipidemia  Under stress  ECHO 2015 was normal  -- Will start on Phentermine 15 mg /day. All side effects including risk for HTN, palpitations, ischemic events, insomnia, CP, dry mouth, risk for valvular heart disease, restlessness etc.were discussed in detailed. There is also a abuse potential and patient was instructed to use the medication as prescribed.   Given significant history of multiple allergens recommend to start half a tablet and see the response.  If tolerated then increase to 50 mg per day  Will get EKG in clinic today.  Last EKG was in February 2016.  No history of valvular heart disease.  Kidney function in normal range.  Discussed bariatric surgery- she does not want it ( as h/o multiple surgeries as  above)  The patient is advised to Make better food choices: reduce carbs, Reduce portion size, weight loss and exercise 3-4 times a week.  Discussed dietician referral- she has seen the dietitian in past for her allergies and she knows about healthy diet.    Discussed indications, risks and benefits of all medications prescribed, and answered questions to patient's satisfaction.  The patient indicates understanding of these issues and agrees with the plan.    Discussed:      Obesity is a biological preventable and treatable disease, which is associated with significantly increased risk of many acute and chronic health conditions. Obesity has now been recognized as a chronic disease by the American Medical Association.    A range of serious co-morbidities are associated with obesity including increased risk for hypertension, stroke, coronary artery disease, dyslipidemia, Type II diabetes, depression, sleep apnea, cancers of the colon, breast and endometrium, osteoarthritis and female infertility. Therefore, obesity is not just a problem; it s a disease that warrants serious evidence based treatements.    I explained to the patient relevant work up for the diagnosis and management of obesity and discussed treatment options. Body Mass Index (BMI) has been a standard means for calculating risk for overweight and obesity. The new American Association of Clinical Endocrinology (AACE) algorithm recommends lifestyle modifications as the initial phase of treatment for all patients with the BMI equal or greater than 25 kg/m2. Lifestyle modifications includes use of medical nutrition therapy, exercise, tobacco cessation, adequate quality and quantity of sleep, limited consumption of alcohol and reduced stress through implementation of a structured, multidisciplinary program.    In patients with complications associated with obesity, graded interventions are recommended including pharmacological therapy such as phentermine,  orlistat, lorcaserin and phentermine/topiramate ER, contrave ( buproprion/naltreone) and the use of very low calorie meal replacement programs.    If medical intervention is insufficient, surgical therapy may be considered, especially in patients with BMI greater than or equal to 35 kg/m2 with multiple complications. Surgical treatments include lap-band, gastric sleeve or gastric bypass surgery.    More than 50% of the time spent with Ms. Mendez on counseling / coordinating her care.      Follow-up:  3 months    Delicia Young MD  Endocrinology   [facility]    CC: Nancy Urban

## 2017-03-28 NOTE — NURSING NOTE
"Chief Complaint   Patient presents with     Weight Problem       Initial /78 (BP Location: Right arm, Patient Position: Chair, Cuff Size: Adult Large)  Pulse 84  Temp 97.1  F (36.2  C) (Oral)  Ht 5' 5.25\" (1.657 m)  Wt 260 lb (117.9 kg)  LMP 06/04/2010  SpO2 97%  BMI 42.94 kg/m2 Estimated body mass index is 42.94 kg/(m^2) as calculated from the following:    Height as of this encounter: 5' 5.25\" (1.657 m).    Weight as of this encounter: 260 lb (117.9 kg).  Medication Reconciliation: complete  "

## 2017-03-28 NOTE — PATIENT INSTRUCTIONS
[facility]   Dr Young, Endocrinology Department      [facility]   [address] [facility] [address] [facility] [address]  Appointment [phone]  Fax: [phone]   Monday and Tuesday         [facility]   [address] [facility] [place] [address]  Appointment [phone]  Fax: [phone]  Wednesday and Thursday           EKG today  Will start on Phentermine 15 mg /day. ll side effects including risk for HTN, palpitations, ischemic events, insomnia, CP, dry mouth, risk for valvular heart disease, restlessness etc.were discussed in detailed. There is also a abuse potential and patient was instructed to use the medication as prescribed.   The patient is advised to Make better food choices: reduce carbs, Reduce portion size, weight loss and exercise 3-4 times a week.  Follow up in 3 months

## 2017-05-01 ENCOUNTER — OFFICE VISIT (OUTPATIENT)
Dept: CARDIOLOGY | Facility: CLINIC | Age: 57
End: 2017-05-01
Attending: INTERNAL MEDICINE
Payer: COMMERCIAL

## 2017-05-01 VITALS
BODY MASS INDEX: 43.49 KG/M2 | SYSTOLIC BLOOD PRESSURE: 144 MMHG | HEIGHT: 65 IN | HEART RATE: 89 BPM | DIASTOLIC BLOOD PRESSURE: 84 MMHG | WEIGHT: 261 LBS

## 2017-05-01 DIAGNOSIS — I49.3 PVC'S (PREMATURE VENTRICULAR CONTRACTIONS): ICD-10-CM

## 2017-05-01 PROCEDURE — 99213 OFFICE O/P EST LOW 20 MIN: CPT | Performed by: NURSE PRACTITIONER

## 2017-05-01 RX ORDER — METOPROLOL SUCCINATE 100 MG/1
100 TABLET, EXTENDED RELEASE ORAL DAILY
Qty: 90 TABLET | Refills: 3 | Status: SHIPPED | OUTPATIENT
Start: 2017-05-01 | End: 2018-09-26

## 2017-05-01 RX ORDER — METOPROLOL TARTRATE 100 MG
100 TABLET ORAL 2 TIMES DAILY
Qty: 60 TABLET | Refills: 1 | Status: SHIPPED | OUTPATIENT
Start: 2017-05-01 | End: 2017-05-01

## 2017-05-01 NOTE — PROGRESS NOTES
HPI and Plan:   See dictation    Orders Placed This Encounter   Procedures     Follow-Up with Cardiologist       Orders Placed This Encounter   Medications     DISCONTD: metoprolol (LOPRESSOR) 100 MG tablet     Sig: Take 1 tablet (100 mg) by mouth 2 times daily     Dispense:  60 tablet     Refill:  1     metoprolol (TOPROL-XL) 100 MG 24 hr tablet     Sig: Take 1 tablet (100 mg) by mouth daily     Dispense:  90 tablet     Refill:  3       Medications Discontinued During This Encounter   Medication Reason     diltiazem (DILACOR XR) 180 MG 24 hr capsule      metoprolol (LOPRESSOR) 100 MG tablet          Encounter Diagnosis   Name Primary?     PVC's (premature ventricular contractions)        CURRENT MEDICATIONS:  Current Outpatient Prescriptions   Medication Sig Dispense Refill     metoprolol (TOPROL-XL) 100 MG 24 hr tablet Take 1 tablet (100 mg) by mouth daily 90 tablet 3     esomeprazole (NEXIUM) 40 MG CR capsule Take 1 capsule (40 mg) by mouth every morning (before breakfast) Take 30-60 minutes before a eating. 30 capsule 1     phentermine 15 MG capsule Take 1 capsule (15 mg) by mouth every morning 30 capsule 3     ondansetron (ZOFRAN-ODT) 4 MG ODT tab Take 1 tablet (4 mg) by mouth every 6 hours as needed for nausea 20 tablet 11     albuterol (2.5 MG/3ML) 0.083% neb solution Take 1 vial (2.5 mg) by nebulization every 4 hours as needed for shortness of breath / dyspnea or wheezing 90 vial 3     glucagon 1 MG kit Inject 1 mg into the muscle once for 1 dose 1 mg 0     acetaminophen (TYLENOL) 500 MG tablet Take 1 tablet (500 mg) by mouth every 6 hours as needed for mild pain 100 tablet 5     Vitamin D, Cholecalciferol, 1000 UNITS TABS Take by mouth daily        Omalizumab (XOLAIR SC) Twice per month       DoxePIN (SINEQUAN) 10 MG capsule 2 tablets twice daily       diphenhydrAMINE (BENADRYL) 25 MG capsule Take 25 mg by mouth every 6 hours as needed.       ibuprofen (ADVIL,MOTRIN) 600 MG tablet Take 1 tablet by mouth  every 6 hours as needed for other (inflammatory pain). 30 tablet      olopatadine (PATANOL) 0.1 % ophthalmic solution Place 2 drops into both eyes daily as needed        montelukast (SINGULAIR) 10 MG tablet Take 10 mg by mouth daily        RANITIDINE  MG OR TABS ONE TABLET TWICE DAILY 3 MONTHS 3     EPI E-Z PEN REMEDIOS 1:1000  IJ 1 TIME ONLY 2 3     ALBUTEROL 90 MCG/ACT IN AERS 1-2 puffs Q 4-6 hrs prn 1 1 year     CALCIUM + D OR 600mg bid       CENTRUM OR TABS 1 TABLET DAILY       IRON RELEASE TBCR 160 MG OR 1 TABLET DAILY         ALLERGIES     Allergies   Allergen Reactions     Toradol Anaphylaxis     Chicken Allergy      hives     Dust Mites      runny, stuffy nose. Gets fungal sinus inf.     Food      FRESH VEGETABLES (COOKED IS OK EXCEPT ONIONS AND BASAL)     Fruit Extracts      Cannot eat raw fruits and vegetables but can eat them cooked     Lactose      intolerant     Latex Hives     Mold      same as dust     No Clinical Screening - See Comments Hives     BASIL     Onion Hives     Oxycodone Difficulty breathing     Relieved with benadryl 50mg     Sunflower Oil Hives     OR SEEDS     Trees      same as dust       PAST MEDICAL HISTORY:  Past Medical History:   Diagnosis Date     Allergic rhinitis, cause unspecified      Allergy, food      Anemia, unspecified      Chronic idiopathic urticaria      Chronic maxillary sinusitis     recurrent fungal infection (remote)     Esophageal reflux      Irregular heart beat      Mild intermittent asthma      Murmur, heart     12/1/2015 3/6 systolic murmur heard - echo ordered     Personal history of other malignant neoplasm of skin     Basal cell     PONV (postoperative nausea and vomiting)      PVC (premature ventricular contraction) 12/2015 12/2015 Holter - SR (HR ) with PVC's and SVPB's       PAST SURGICAL HISTORY:  Past Surgical History:   Procedure Laterality Date     C NONSPECIFIC PROCEDURE  1998    hemorrhoidectomy     C NONSPECIFIC PROCEDURE  82     salivary gland resection rt side     C NONSPECIFIC PROCEDURE  1998    hemorrhoidectomy     C NONSPECIFIC PROCEDURE  2000    sinus surgery     HC ABLATION, ENDOMETRIAL, THERMAL, W/O HYSTEROSCOPIC GUIDANCE  7/10     HEPATECTOMY PARTIAL Left 2/16/2016    Procedure: HEPATECTOMY PARTIAL;  Surgeon: Kevin Howard MD;  Location: [facility] OR     HYSTERECTOMY, PAP NO LONGER INDICATED  8/2010     LAPAROSCOPIC HEPATECTOMY PARTIAL Left 2/16/2016    Procedure: LAPAROSCOPIC HEPATECTOMY PARTIAL;  Surgeon: Kevin Howard MD;  Location: [facility] OR     LAPAROSCOPY DIAGNOSTIC (GENERAL) N/A 2/16/2016    Procedure: LAPAROSCOPY DIAGNOSTIC (GENERAL);  Surgeon: Kevin Howard MD;  Location: [facility] OR     RECONSTRUCT ABDOMINAL WALL  10/23/2012    Procedure: RECONSTRUCT ABDOMINAL WALL;  ABDOMINAL WALL RECONSTRUCTION WITH MESH ;  Surgeon: Romi Renteria MD;  Location: [facility]     SALPINGO OOPHORECTOMY,R/L/FAUSTINA      Salpingo Oophorectomy, RT/LT/FAUSTINA     SURGICAL HISTORY OF -       partial colectomy (3/4 colon)       FAMILY HISTORY:  Family History   Problem Relation Age of Onset     Cardiovascular Maternal Grandmother      CHF     DIABETES Maternal Grandmother      OSTEOPOROSIS Maternal Grandmother      DIABETES Mother      Hypertension Mother      Lipids Mother      Breast Cancer Mother      CANCER Mother 82     DX this year, 2015- breast cancer     Alzheimer Disease Mother      CEREBROVASCULAR DISEASE Paternal Grandmother      CANCER Father      Skin cancer     DIABETES Maternal Aunt      Cancer - colorectal Maternal Aunt      Thyroid Disease No family hx of      C.A.D. No family hx of        SOCIAL HISTORY:  Social History     Social History     Marital status:      Spouse name: Ritesh     Number of children: 2     Years of education: 14     Occupational History     Homemaker      Social History Main Topics     Smoking status: Never Smoker     Smokeless tobacco: Never Used     Alcohol use No      Comment: RARE     Drug use: No     Sexual activity: Yes  "    Partners: Male     Birth control/ protection: Pill, Surgical      Comment:  had vasectomy     Other Topics Concern     Parent/Sibling W/ Cabg, Mi Or Angioplasty Before 65f 55m? No     Caffeine Concern No     Sleep Concern No     Special Diet Yes     allergy related      Exercise No     some walking      Seat Belt Yes     Social History Narrative    .  Lives with .  2 sons, both with autism.  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        Review of Systems:  Skin:  not assessed       Eyes:  Positive for glasses    ENT:  Negative      Respiratory:  Negative       Cardiovascular:    Positive for;palpitations    Gastroenterology: Positive for reflux    Genitourinary:  Negative      Musculoskeletal:  Positive for arthritis;joint pain;joint stiffness;foot pain    Neurologic:  Negative      Psychiatric:  Positive for sleep disturbances    Heme/Lymph/Imm:  Positive for allergies;hay fever;anemia;easy bruising RX allergies ,  Food allergies , Environmental allergies  Endocrine:  Negative        Physical Exam:  Vitals: /84  Pulse 89  Ht 1.651 m (5' 5\")  Wt 118.4 kg (261 lb)  LMP 06/04/2010  BMI 43.43 kg/m2    Constitutional:           Skin:           Head:           Eyes:           ENT:           Neck:           Chest:             Cardiac:                    Abdomen:           Vascular:                                          Extremities and Back:                 Neurological:                 CC  Ritesh Hillman MD   [facility]  [address]  [place] [address]              "

## 2017-05-01 NOTE — MR AVS SNAPSHOT
After Visit Summary   5/1/2017    Stephanie Mendez    MRN: [id number]           Patient Information     Date Of Birth          1960        Visit Information        Provider Department      5/1/2017 12:30 PM Mariana Zepeda APRN CNP [facility] [place]        Today's Diagnoses     PVC's (premature ventricular contractions)          Care Instructions    Metoprolol succinate  mg as needed for sleep or suppression of symptoms of your PVCs        Follow-ups after your visit        Additional Services     Follow-Up with Cardiologist                 Future tests that were ordered for you today     Open Future Orders        Priority Expected Expires Ordered    Follow-Up with Cardiologist Routine 5/1/2018 9/13/2018 5/1/2017            Who to contact     If you have questions or need follow up information about today's clinic visit or your schedule please contact [facility] directly at [phone].  Normal or non-critical lab and imaging results will be communicated to you by Go Pool and Spahart, letter or phone within 4 business days after the clinic has received the results. If you do not hear from us within 7 days, please contact the clinic through Go Pool and Spahart or phone. If you have a critical or abnormal lab result, we will notify you by phone as soon as possible.  Submit refill requests through Bancha or call your pharmacy and they will forward the refill request to us. Please allow 3 business days for your refill to be completed.          Additional Information About Your Visit        MyChart Information     Bancha gives you secure access to your electronic health record. If you see a primary care provider, you can also send messages to your care team and make appointments. If you have questions, please call your primary care clinic.  If you do not have a primary care provider, please call [phone] and they will assist you.       "  Care EveryWhere ID     This is your Care EveryWhere ID. This could be used by other organizations to access your [facility] medical records  [id number]        Your Vitals Were     Pulse Height Last Period BMI (Body Mass Index)          89 1.651 m (5' 5\") 06/04/2010 43.43 kg/m2         Blood Pressure from Last 3 Encounters:   05/01/17 144/84   03/28/17 120/78   03/02/17 118/82    Weight from Last 3 Encounters:   05/01/17 118.4 kg (261 lb)   03/28/17 117.9 kg (260 lb)   03/02/17 116.1 kg (256 lb)              We Performed the Following     Follow-Up with Cardiac Advanced Practice Provider          Today's Medication Changes          These changes are accurate as of: 5/1/17  1:05 PM.  If you have any questions, ask your nurse or doctor.               Start taking these medicines.        Dose/Directions    metoprolol 100 MG 24 hr tablet   Commonly known as:  TOPROL-XL   Used for:  PVC's (premature ventricular contractions)   Started by:  Mariana Zepeda APRN CNP        Dose:  100 mg   Take 1 tablet (100 mg) by mouth daily   Quantity:  90 tablet   Refills:  3         Stop taking these medicines if you haven't already. Please contact your care team if you have questions.     diltiazem 180 MG 24 hr capsule   Commonly known as:  DILACOR XR   Stopped by:  Mariana Zepeda APRN CNP                Where to get your medicines      These medications were sent to [facility]/pharmacy #[id number] - [place], [place] - [address] AT CORNER OF [address] [place], [place] [address]     Phone:  [phone]     metoprolol 100 MG 24 hr tablet                Primary Care Provider Office Phone # Fax #    Nancy Urban [phone]       [facility] [address] [place] [address] [address] [place] [address]        Thank you!     Thank you for choosing [facility] [place]  for your care. Our goal is always to provide you with excellent care. Hearing back from our patients is " one way we can continue to improve our services. Please take a few minutes to complete the written survey that you may receive in the mail after your visit with us. Thank you!             Your Updated Medication List - Protect others around you: Learn how to safely use, store and throw away your medicines at www.disposemymeds.org.          This list is accurate as of: 5/1/17  1:05 PM.  Always use your most recent med list.                   Brand Name Dispense Instructions for use    acetaminophen 500 MG tablet    TYLENOL    100 tablet    Take 1 tablet (500 mg) by mouth every 6 hours as needed for mild pain       albuterol (2.5 MG/3ML) 0.083% neb solution     90 vial    Take 1 vial (2.5 mg) by nebulization every 4 hours as needed for shortness of breath / dyspnea or wheezing       albuterol 90 MCG/ACT inhaler     1    1-2 puffs Q 4-6 hrs prn       BENADRYL 25 MG capsule   Generic drug:  diphenhydrAMINE      Take 25 mg by mouth every 6 hours as needed.       CALCIUM + D PO      600mg bid       CENTRUM Tabs      1 TABLET DAILY       doxepin 10 MG capsule    SINEquan     2 tablets twice daily       EPI E-Z PEN REMEDIOS 1:1000  IJ     2    1 TIME ONLY       glucagon 1 MG kit     1 mg    Inject 1 mg into the muscle once for 1 dose       ibuprofen 600 MG tablet    ADVIL/MOTRIN    30 tablet    Take 1 tablet by mouth every 6 hours as needed for other (inflammatory pain).       IRON RELEASE TBCR 160 MG OR      1 TABLET DAILY       metoprolol 100 MG 24 hr tablet    TOPROL-XL    90 tablet    Take 1 tablet (100 mg) by mouth daily       nexIUM 40 MG CR capsule   Generic drug:  esomeprazole     30 capsule    Take 1 capsule (40 mg) by mouth every morning (before breakfast) Take 30-60 minutes before a eating.       ondansetron 4 MG ODT tab    ZOFRAN-ODT    20 tablet    Take 1 tablet (4 mg) by mouth every 6 hours as needed for nausea       PATANOL 0.1 % ophthalmic solution   Generic drug:  olopatadine      Place 2 drops into both eyes  daily as needed       phentermine 15 MG capsule     30 capsule    Take 1 capsule (15 mg) by mouth every morning       ranitidine 150 MG tablet    ZANTAC    3 MONTHS    ONE TABLET TWICE DAILY       SINGULAIR 10 MG tablet   Generic drug:  montelukast      Take 10 mg by mouth daily       Vitamin D (Cholecalciferol) 1000 UNITS Tabs      Take by mouth daily       XOLAIR SC      Twice per month

## 2017-05-01 NOTE — LETTER
5/1/2017    Nancy Urban MD  [address] [place] [address]    RE: Stephanie Mendez       Dear Colleague,    I had the pleasure of seeing Stephanie Mendez in the [facility].    Stephanie is a 56-year-old woman who is here today for further evaluation of her PVCs and palpitations.  This is an annual clinic visit.  She is followed by Dr. Hillman.  She has a history of symptomatic PVCs which have been well controlled with metoprolol; however, she has recently found out that metoprolol may cause her EpiPen not to be as effective.  She was told this by her allergist.  Therefore, she followed up with Dr. Pierre for other possibilities.  Stephanie had a Holter monitor which confirmed 42,000 PVCs in a 48-hour period which was about a 20% burden.  An echo demonstrated mild concentric LVH and an ejection fraction of 60%.  Cardiac MRI revealed mildly asymmetrical LVH; however, no other signs of hypertrophic cardiomyopathy.  In the past, a stress echo was negative for ischemia.  It was noted her PVCs disappeared with exercise.  Dr. Hillman had placed her on beta blockers which responded well to her PVCs.  Stephanie has a severe allergy history and has had anaphylactic reactions and needs to have an EpiPen on her at all times.      When she followed up with Dr. Pierre, he discussed the following options, switching to a calcium channel blocker and PVCs, ablation.  Suly did try using a calcium channel blocker, but she did not have resolution of her palpitations like she did with the beta blocker.  Today, Stephanie states that she does not take the metoprolol now on a daily basis.  Instead, she takes the metoprolol only when the PVCs are preventing her from getting a good night's sleep.  She has to take a dose about every couple of weeks.  She feels more comfortable with this approach than the daily approach.  She states that she feels palpitations all through the day,  but because she is busy doing other things she can ignore it, but when she is in bed at night having trouble falling asleep the palpitations seem worse.      PHYSICAL EXAMINATION:   GENERAL:  The patient is alert and oriented.   SKIN:  Warm and dry.  She is in no acute distress.   CARDIOVASCULAR:  Heart tones are regular with a frequent ectopic beat.   LUNGS:  Clear without crackles or wheezes.   EXTREMITIES:  Lower extremities are free of edema.     Outpatient Encounter Prescriptions as of 5/1/2017   Medication Sig Dispense Refill     metoprolol (TOPROL-XL) 100 MG 24 hr tablet Take 1 tablet (100 mg) by mouth daily 90 tablet 3     esomeprazole (NEXIUM) 40 MG CR capsule Take 1 capsule (40 mg) by mouth every morning (before breakfast) Take 30-60 minutes before a eating. 30 capsule 1     phentermine 15 MG capsule Take 1 capsule (15 mg) by mouth every morning 30 capsule 3     ondansetron (ZOFRAN-ODT) 4 MG ODT tab Take 1 tablet (4 mg) by mouth every 6 hours as needed for nausea 20 tablet 11     albuterol (2.5 MG/3ML) 0.083% neb solution Take 1 vial (2.5 mg) by nebulization every 4 hours as needed for shortness of breath / dyspnea or wheezing 90 vial 3     glucagon 1 MG kit Inject 1 mg into the muscle once for 1 dose 1 mg 0     [DISCONTINUED] acetaminophen (TYLENOL) 500 MG tablet Take 1 tablet (500 mg) by mouth every 6 hours as needed for mild pain 100 tablet 5     Vitamin D, Cholecalciferol, 1000 UNITS TABS Take by mouth daily        Omalizumab (XOLAIR SC) Twice per month       DoxePIN (SINEQUAN) 10 MG capsule 2 tablets twice daily       diphenhydrAMINE (BENADRYL) 25 MG capsule Take 25 mg by mouth every 6 hours as needed.       ibuprofen (ADVIL,MOTRIN) 600 MG tablet Take 1 tablet by mouth every 6 hours as needed for other (inflammatory pain). 30 tablet      olopatadine (PATANOL) 0.1 % ophthalmic solution Place 2 drops into both eyes daily as needed        montelukast (SINGULAIR) 10 MG tablet Take 10 mg by mouth daily         RANITIDINE  MG OR TABS ONE TABLET TWICE DAILY 3 MONTHS 3     EPI E-Z PEN REMEDIOS 1:1000  IJ 1 TIME ONLY 2 3     ALBUTEROL 90 MCG/ACT IN AERS 1-2 puffs Q 4-6 hrs prn 1 1 year     CALCIUM + D OR 600mg bid       CENTRUM OR TABS 1 TABLET DAILY       IRON RELEASE TBCR 160 MG OR 1 TABLET DAILY       [DISCONTINUED] metoprolol (LOPRESSOR) 100 MG tablet Take 1 tablet (100 mg) by mouth 2 times daily 60 tablet 1     [DISCONTINUED] diltiazem (DILACOR XR) 180 MG 24 hr capsule Take 1 capsule (180 mg) by mouth daily 30 capsule 5     No facility-administered encounter medications on file as of 5/1/2017.       IMPRESSION AND PLAN:  Symptomatic PVCs that are tolerable to the patient, except at night when they interfere with her ability to go to sleep.  Lately she has just been taking it on an as needed basis with good results.  With this plan, she feels more comfortable rather than taking it every day.  The chances that she will need an EpiPen are pretty rare since she has only used it once and that was 30 years ago in her 20s.  The other approach she is going to take is make sure she carries 2 EpiPens in case when she does need it if the first one becomes ineffective she has a second one on hand.  She is under the understanding by Dr. Pierre that the type of PVC she has may not be amenable to an ablation or the percent success is lower.  Therefore, Stephanie is not interested in pursuing that approach.  I will set Stephanie up for another visit with Dr. Hillman in 1 year.  We would be happy to follow up with her as needed in the interim.     Again, thank you for allowing me to participate in the care of your patient.      Sincerely,    LEWIS Lopez [facility]

## 2017-05-01 NOTE — PROGRESS NOTES
HISTORY OF PRESENT ILLNESS:  Stephanie is a 56-year-old woman who is here today for further evaluation of her PVCs and palpitations.  This is an annual clinic visit.  She is followed by Dr. Hillman.  She has a history of symptomatic PVCs which have been well controlled with metoprolol; however, she has recently found out that metoprolol may cause her EpiPen not to be as effective.  She was told this by her allergist.  Therefore, she followed up with Dr. Pierre for other possibilities.  Stephanie had a Holter monitor which confirmed 42,000 PVCs in a 48-hour period which was about a 20% burden.  An echo demonstrated mild concentric LVH and an ejection fraction of 60%.  Cardiac MRI revealed mildly asymmetrical LVH; however, no other signs of hypertrophic cardiomyopathy.  In the past, a stress echo was negative for ischemia.  It was noted her PVCs disappeared with exercise.  Dr. Hillman had placed her on beta blockers which responded well to her PVCs.  Stephanie has a severe allergy history and has had anaphylactic reactions and needs to have an EpiPen on her at all times.      When she followed up with Dr. Pierre, he discussed the following options, switching to a calcium channel blocker and PVCs, ablation.  Suly did try using a calcium channel blocker, but she did not have resolution of her palpitations like she did with the beta blocker.  Today, Stephanie states that she does not take the metoprolol now on a daily basis.  Instead, she takes the metoprolol only when the PVCs are preventing her from getting a good night's sleep.  She has to take a dose about every couple of weeks.  She feels more comfortable with this approach than the daily approach.  She states that she feels palpitations all through the day, but because she is busy doing other things she can ignore it, but when she is in bed at night having trouble falling asleep the palpitations seem worse.      PHYSICAL EXAMINATION:   GENERAL:  The patient is alert and oriented.    SKIN:  Warm and dry.  She is in no acute distress.   CARDIOVASCULAR:  Heart tones are regular with a frequent ectopic beat.   LUNGS:  Clear without crackles or wheezes.   EXTREMITIES:  Lower extremities are free of edema.      IMPRESSION AND PLAN:  Symptomatic PVCs that are tolerable to the patient, except at night when they interfere with her ability to go to sleep.  Lately she has just been taking it on an as needed basis with good results.  With this plan, she feels more comfortable rather than taking it every day.  The chances that she will need an EpiPen are pretty rare since she has only used it once and that was 30 years ago in her 20s.  The other approach she is going to take is make sure she carries 2 EpiPens in case when she does need it if the first one becomes ineffective she has a second one on hand.  She is under the understanding by Dr. Pierre that the type of PVC she has may not be amenable to an ablation or the percent success is lower.  Therefore, Krystal is not interested in pursuing that approach.  I will set Krystal up for another visit with Dr. Hillman in 1 year.  We would be happy to follow up with her as needed in the interim.         LEWIS NAJERA, CNP             D: 2017 13:21   T: 2017 14:54   MT: [id number]      Name:     KRYSTAL AMIN   MRN:      [id number]        Account:      [id number]   :      1960           Service Date: 2017      Document: [id number]

## 2017-06-06 ENCOUNTER — TRANSFERRED RECORDS (OUTPATIENT)
Dept: HEALTH INFORMATION MANAGEMENT | Facility: CLINIC | Age: 57
End: 2017-06-06

## 2017-06-23 ENCOUNTER — OFFICE VISIT (OUTPATIENT)
Dept: PEDIATRICS | Facility: CLINIC | Age: 57
End: 2017-06-23
Payer: COMMERCIAL

## 2017-06-23 VITALS
BODY MASS INDEX: 42.89 KG/M2 | TEMPERATURE: 97.8 F | WEIGHT: 257.44 LBS | SYSTOLIC BLOOD PRESSURE: 126 MMHG | HEIGHT: 65 IN | DIASTOLIC BLOOD PRESSURE: 82 MMHG | HEART RATE: 84 BPM | OXYGEN SATURATION: 95 %

## 2017-06-23 DIAGNOSIS — L25.9 CONTACT DERMATITIS, UNSPECIFIED CONTACT DERMATITIS TYPE, UNSPECIFIED TRIGGER: Primary | ICD-10-CM

## 2017-06-23 PROCEDURE — 99213 OFFICE O/P EST LOW 20 MIN: CPT | Performed by: INTERNAL MEDICINE

## 2017-06-23 RX ORDER — TRIAMCINOLONE ACETONIDE 5 MG/G
CREAM TOPICAL
Qty: 30 G | Refills: 0 | Status: SHIPPED | OUTPATIENT
Start: 2017-06-23 | End: 2018-03-12

## 2017-06-23 NOTE — MR AVS SNAPSHOT
"              After Visit Summary   6/23/2017    Stephanie Mendez    MRN: [id number]           Patient Information     Date Of Birth          1960        Visit Information        Provider Department      6/23/2017 8:40 AM Rocio Cano MD [facility] [place]        Today's Diagnoses     Contact dermatitis, unspecified contact dermatitis type, unspecified trigger    -  1       Follow-ups after your visit        Who to contact     If you have questions or need follow up information about today's clinic visit or your schedule please contact [facility] directly at [phone].  Normal or non-critical lab and imaging results will be communicated to you by [employer], letter or phone within 4 business days after the clinic has received the results. If you do not hear from us within 7 days, please contact the clinic through [employer] or phone. If you have a critical or abnormal lab result, we will notify you by phone as soon as possible.  Submit refill requests through [employer] or call your pharmacy and they will forward the refill request to us. Please allow 3 business days for your refill to be completed.          Additional Information About Your Visit        MyChart Information     [employer] gives you secure access to your electronic health record. If you see a primary care provider, you can also send messages to your care team and make appointments. If you have questions, please call your primary care clinic.  If you do not have a primary care provider, please call [phone] and they will assist you.        Care EveryWhere ID     This is your Care EveryWhere ID. This could be used by other organizations to access your [facility] records  [id number]        Your Vitals Were     Pulse Temperature Height Last Period Pulse Oximetry BMI (Body Mass Index)    84 97.8  F (36.6  C) (Tympanic) 5' 5\" (1.651 m) 06/04/2010 95% 42.84 kg/m2       Blood Pressure from Last 3 Encounters:   06/23/17 " 126/82   05/01/17 144/84   03/28/17 120/78    Weight from Last 3 Encounters:   06/23/17 257 lb 7 oz (116.8 kg)   05/01/17 261 lb (118.4 kg)   03/28/17 260 lb (117.9 kg)              We Performed the Following     Asthma Action Plan (AAP)          Today's Medication Changes          These changes are accurate as of: 6/23/17  9:20 AM.  If you have any questions, ask your nurse or doctor.               Start taking these medicines.        Dose/Directions    triamcinolone 0.5 % cream   Commonly known as:  KENALOG   Used for:  Contact dermatitis, unspecified contact dermatitis type, unspecified trigger   Started by:  Rocio Cano MD        Apply sparingly to affected area three times daily.   Quantity:  30 g   Refills:  0            Where to get your medicines      These medications were sent to [facility]/pharmacy #[id number] - [place], [place] - [address] AT [address] [place], [place] [address]     Phone:  [phone]     triamcinolone 0.5 % cream                Primary Care Provider Office Phone # Fax #    Nancy Urban [phone]       [address] [place] [address]        Equal Access to Services     [facility]: Hadii caleb lombardo hadasho Soomaali, waaxda luqadaha, qaybta kaalmada adeegyada, waxay moodyin hayjabiern fallon rubin. So [facility] [phone].    ATENCIÓN: Si habla español, tiene a stoddard disposición servicios gratuitos de asistencia lingüística. Llame al [phone].    We comply with applicable federal civil rights laws and [place] laws. We do not discriminate on the basis of race, color, national origin, age, disability sex, sexual orientation or gender identity.            Thank you!     Thank you for choosing [facility]  for your care. Our goal is always to provide you with excellent care. Hearing back from our patients is one way we can continue to improve our services. Please take a few  minutes to complete the written survey that you may receive in the mail after your visit with us. Thank you!             Your Updated Medication List - Protect others around you: Learn how to safely use, store and throw away your medicines at www.disposemymeds.org.          This list is accurate as of: 6/23/17  9:20 AM.  Always use your most recent med list.                   Brand Name Dispense Instructions for use Diagnosis    albuterol (2.5 MG/3ML) 0.083% neb solution     90 vial    Take 1 vial (2.5 mg) by nebulization every 4 hours as needed for shortness of breath / dyspnea or wheezing    Mild persistent asthma without complication       albuterol 90 MCG/ACT inhaler     1    1-2 puffs Q 4-6 hrs prn    Mild intermittent asthma       BENADRYL 25 MG capsule   Generic drug:  diphenhydrAMINE      Take 25 mg by mouth every 6 hours as needed.        CALCIUM + D PO      600mg bid        CENTRUM Tabs      1 TABLET DAILY        doxepin 10 MG capsule    SINEquan     2 tablets twice daily    Angioedema       EPI E-Z PEN REMEDIOS 1:1000  IJ     2    1 TIME ONLY    Allergy to other foods       glucagon 1 MG kit     1 mg    Inject 1 mg into the muscle once for 1 dose    Urticaria       ibuprofen 600 MG tablet    ADVIL/MOTRIN    30 tablet    Take 1 tablet by mouth every 6 hours as needed for other (inflammatory pain).    Ventral hernia       IRON RELEASE TBCR 160 MG OR      1 TABLET DAILY        metoprolol 100 MG 24 hr tablet    TOPROL-XL    90 tablet    Take 1 tablet (100 mg) by mouth daily    PVC's (premature ventricular contractions)       nexIUM 40 MG CR capsule   Generic drug:  esomeprazole     30 capsule    Take 1 capsule (40 mg) by mouth every morning (before breakfast) Take 30-60 minutes before a eating.        ondansetron 4 MG ODT tab    ZOFRAN-ODT    20 tablet    Take 1 tablet (4 mg) by mouth every 6 hours as needed for nausea    Ventral hernia without obstruction or gangrene, Nausea       PATANOL 0.1 % ophthalmic  solution   Generic drug:  olopatadine      Place 2 drops into both eyes daily as needed        phentermine 15 MG capsule     30 capsule    Take 1 capsule (15 mg) by mouth every morning    Morbid obesity, unspecified obesity type (H)       ranitidine 150 MG tablet    ZANTAC    3 MONTHS    ONE TABLET TWICE DAILY        SINGULAIR 10 MG tablet   Generic drug:  montelukast      Take 10 mg by mouth daily        triamcinolone 0.5 % cream    KENALOG    30 g    Apply sparingly to affected area three times daily.    Contact dermatitis, unspecified contact dermatitis type, unspecified trigger       Vitamin D (Cholecalciferol) 1000 UNITS Tabs      Take by mouth daily        XOLAIR SC      Twice per month

## 2017-06-23 NOTE — NURSING NOTE
"Chief Complaint   Patient presents with     Shingles     possible shingles       Initial /82 (BP Location: Right arm, Patient Position: Chair, Cuff Size: Adult Large)  Pulse 84  Temp 97.8  F (36.6  C) (Tympanic)  Ht 5' 5\" (1.651 m)  Wt 257 lb 7 oz (116.8 kg)  LMP 06/04/2010  SpO2 95%  BMI 42.84 kg/m2 Estimated body mass index is 42.84 kg/(m^2) as calculated from the following:    Height as of this encounter: 5' 5\" (1.651 m).    Weight as of this encounter: 257 lb 7 oz (116.8 kg).  Medication Reconciliation: complete   Lizette Kramer CMA    "

## 2017-06-23 NOTE — PROGRESS NOTES
"  SUBJECTIVE:                                                    Stephanie Mendez is a 56 year old female who presents to clinic today for the following health issues:      Rash      Duration: sx started 3 days ago    Description  Location: upper back, scalp, legs and arms, in back of extremities  Itching: severe    Intensity:  moderate    Accompanying signs and symptoms: redness, itchy, painful, tingling,headaches, swollen lymph nodes    History (similar episodes/previous evaluation): yes    Precipitating or alleviating factors:  New exposures:  None  Recent travel: no      Therapies tried and outcome: antihistamine     Notes red bumps with fluid filled centers on bilateral upper back, back of left arm and left leg.  She thinks she has shingles.  Notes that her \"lymph nodes hurt\" but have now resolved.  Took antihistamine but that did not help.  Rash started Tuesday.   She notes these spots are itchy.        Problem list and histories reviewed & adjusted, as indicated.  Additional history: as documented    Patient Active Problem List   Diagnosis     Female genital symptoms     Anemia     Mild persistent asthma     Allergic rhinitis     GERD (gastroesophageal reflux disease)     Angioedema     Urticaria     CARDIOVASCULAR SCREENING; LDL GOAL LESS THAN 160     Ventral hernia     Anaphylaxis     Other back pain     Morbid obesity, unspecified obesity type (H)     Chronic idiopathic urticaria     Allergy, food     Murmur, heart     Palpitations     PVC's (premature ventricular contractions)     Hepatic cyst     Past Surgical History:   Procedure Laterality Date     C NONSPECIFIC PROCEDURE  1998    hemorrhoidectomy     C NONSPECIFIC PROCEDURE  82    salivary gland resection rt side     C NONSPECIFIC PROCEDURE  1998    hemorrhoidectomy     C NONSPECIFIC PROCEDURE  2000    sinus surgery     HC ABLATION, ENDOMETRIAL, THERMAL, W/O HYSTEROSCOPIC GUIDANCE  7/10     HEPATECTOMY PARTIAL Left 2/16/2016    Procedure: HEPATECTOMY " "PARTIAL;  Surgeon: Kevin Howard MD;  Location: [facility] OR     HYSTERECTOMY, PAP NO LONGER INDICATED  8/2010     LAPAROSCOPIC HEPATECTOMY PARTIAL Left 2/16/2016    Procedure: LAPAROSCOPIC HEPATECTOMY PARTIAL;  Surgeon: Kevin Howard MD;  Location: [facility] OR     LAPAROSCOPY DIAGNOSTIC (GENERAL) N/A 2/16/2016    Procedure: LAPAROSCOPY DIAGNOSTIC (GENERAL);  Surgeon: Kevin Howard MD;  Location: [facility] OR     RECONSTRUCT ABDOMINAL WALL  10/23/2012    Procedure: RECONSTRUCT ABDOMINAL WALL;  ABDOMINAL WALL RECONSTRUCTION WITH MESH ;  Surgeon: Romi Renteria MD;  Location: [facility] OR     SALPINGO OOPHORECTOMY,R/L/FAUSTINA      Salpingo Oophorectomy, RT/LT/FAUSTINA     SURGICAL HISTORY OF -       partial colectomy (3/4 colon)       Social History   Substance Use Topics     Smoking status: Never Smoker     Smokeless tobacco: Never Used     Alcohol use No      Comment: RARE     Family History   Problem Relation Age of Onset     Cardiovascular Maternal Grandmother      CHF     DIABETES Maternal Grandmother      OSTEOPOROSIS Maternal Grandmother      DIABETES Mother      Hypertension Mother      Lipids Mother      Breast Cancer Mother      CANCER Mother 82     DX this year, 2015- breast cancer     Alzheimer Disease Mother      CEREBROVASCULAR DISEASE Paternal Grandmother      CANCER Father      Skin cancer     DIABETES Maternal Aunt      Cancer - colorectal Maternal Aunt      Thyroid Disease No family hx of      C.A.D. No family hx of            Reviewed and updated as needed this visit by clinical staff  Tobacco  Allergies  Meds  Med Hx       Reviewed and updated as needed this visit by Provider         ROS:  Constitutional, HEENT, cardiovascular, pulmonary, gi and gu systems are negative, except as otherwise noted.    OBJECTIVE:                                                    /82 (BP Location: Right arm, Patient Position: Chair, Cuff Size: Adult Large)  Pulse 84  Temp 97.8  F (36.6  C) (Tympanic)  Ht 5' 5\" (1.651 m)  Wt 257 lb 7 oz " (116.8 kg)  [facility] 06/04/2010  SpO2 95%  BMI 42.84 kg/m2  Body mass index is 42.84 kg/(m^2).  GENERAL: healthy, alert and no distress  RESP: lungs clear to auscultation - no rales, rhonchi or wheezes  CV: regular rate and rhythm, normal S1 S2, no S3 or S4, no murmur, click or rub, no peripheral edema and peripheral pulses strong  SKIN: 4-5 discrete pinpoint sized erythematous bumps on left arm, bilateral upper back and right flank. Due to small size its difficult to tell if there is any blistering.      Diagnostic Test Results:  none      ASSESSMENT/PLAN:                                                    (L25.9) Contact dermatitis, unspecified contact dermatitis type, unspecified trigger  (primary encounter diagnosis)  -unclear if these are bites or are rash, clustering of some of the lesion on right flank makes me suspect a contact dermatitis  -reassured patient that as this is bilateral it is definitely not shingles  -will do trial of topical steroid  -if worsening would have her see dermatology    Plan: triamcinolone (KENALOG) 0.5 % cream      FUTURE APPOINTMENTS:       - Follow-up for annual visit or as needed    Rocio Cano MD  [facility]

## 2017-06-23 NOTE — LETTER
My Asthma Action Plan  Name: Stephanie Mendez   YOB: 1960  Date: 6/23/2017   My doctor: Rocio Cano MD   My clinic: [facility]        My Control Medicine: singulair  My Rescue Medicine: Albuterol (Proair/Ventolin/Proventil) inhaler n/a   My Asthma Severity: mild persistent  Avoid your asthma triggers: none               GREEN ZONE   Good Control    I feel good    No cough or wheeze    Can work, sleep and play without asthma symptoms       Take your asthma control medicine every day.     1. If exercise triggers your asthma, take your rescue medication    15 minutes before exercise or sports, and    During exercise if you have asthma symptoms  2. Spacer to use with inhaler: If you have a spacer, make sure to use it with your inhaler             YELLOW ZONE Getting Worse  I have ANY of these:    I do not feel good    Cough or wheeze    Chest feels tight    Wake up at night   1. Keep taking your Green Zone medications  2. Start taking your rescue medicine:    every 20 minutes for up to 1 hour. Then every 4 hours for 24-48 hours.  3. If you stay in the Yellow Zone for more than 12-24 hours, contact your doctor.  4. If you do not return to the Green Zone in 12-24 hours or you get worse, start taking your oral steroid medicine if prescribed by your provider.           RED ZONE Medical Alert - Get Help  I have ANY of these:    I feel awful    Medicine is not helping    Breathing getting harder    Trouble walking or talking    Nose opens wide to breathe       1. Take your rescue medicine NOW  2. If your provider has prescribed an oral steroid medicine, start taking it NOW  3. Call your doctor NOW  4. If you are still in the Red Zone after 20 minutes and you have not reached your doctor:    Take your rescue medicine again and    Call 911 or go to the emergency room right away    See your regular doctor within 2 weeks of an Emergency Room or Urgent Care visit for follow-up treatment.         Electronically signed by: Lizette Kramer, June 23, 2017    Annual Reminders:  Meet with Asthma Educator,  Flu Shot in the Fall, consider Pneumonia Vaccination for patients with asthma (aged 19 and older).    Pharmacy: [facility]/PHARMACY #[id number] - [place], [place] - [address] [facility] [place]                    Asthma Triggers  How To Control Things That Make Your Asthma Worse    Triggers are things that make your asthma worse.  Look at the list below to help you find your triggers and what you can do about them.  You can help prevent asthma flare-ups by staying away from your triggers.      Trigger                                                          What you can do   Cigarette Smoke  Tobacco smoke can make asthma worse. Do not allow smoking in your home, car or around you.  Be sure no one smokes at a child s day care or school.  If you smoke, ask your health care provider for ways to help you quit.  Ask family members to quit too.  Ask your health care provider for a referral to Quit Plan to help you quit smoking, or call 5-448-938-PLAN.     Colds, Flu, Bronchitis  These are common triggers of asthma. Wash your hands often.  Don t touch your eyes, nose or mouth.  Get a flu shot every year.     Dust Mites  These are tiny bugs that live in cloth or carpet. They are too small to see. Wash sheets and blankets in hot water every week.   Encase pillows and mattress in dust mite proof covers.  Avoid having carpet if you can. If you have carpet, vacuum weekly.   Use a dust mask and HEPA vacuum.   Pollen and Outdoor Mold  Some people are allergic to trees, grass, or weed pollen, or molds. Try to keep your windows closed.  Limit time out doors when pollen count is high.   Ask you health care provider about taking medicine during allergy season.     Animal Dander  Some people are allergic to skin flakes, urine or saliva from pets with fur or feathers. Keep pets with fur or feathers out of your home.    If  you can t keep the pet outdoors, then keep the pet out of your bedroom.  Keep the bedroom door closed.  Keep pets off cloth furniture and away from stuffed toys.     Mice, Rats, and Cockroaches  Some people are allergic to the waste from these pests.   Cover food and garbage.  Clean up spills and food crumbs.  Store grease in the refrigerator.   Keep food out of the bedroom.   Indoor Mold  This can be a trigger if your home has high moisture. Fix leaking faucets, pipes, or other sources of water.   Clean moldy surfaces.  Dehumidify basement if it is damp and smelly.   Smoke, Strong Odors, and Sprays  These can reduce air quality. Stay away from strong odors and sprays, such as perfume, powder, hair spray, paints, smoke incense, paint, cleaning products, candles and new carpet.   Exercise or Sports  Some people with asthma have this trigger. Be active!  Ask your doctor about taking medicine before sports or exercise to prevent symptoms.    Warm up for 5-10 minutes before and after sports or exercise.     Other Triggers of Asthma  Cold air:  Cover your nose and mouth with a scarf.  Sometimes laughing or crying can be a trigger.  Some medicines and food can trigger asthma.

## 2017-07-08 DIAGNOSIS — Z12.31 VISIT FOR SCREENING MAMMOGRAM: ICD-10-CM

## 2017-07-08 PROCEDURE — G0202 SCR MAMMO BI INCL CAD: HCPCS | Mod: TC

## 2017-11-20 ENCOUNTER — TRANSFERRED RECORDS (OUTPATIENT)
Dept: HEALTH INFORMATION MANAGEMENT | Facility: CLINIC | Age: 57
End: 2017-11-20

## 2017-11-27 DIAGNOSIS — J01.00 ACUTE NON-RECURRENT MAXILLARY SINUSITIS: Primary | ICD-10-CM

## 2017-11-27 DIAGNOSIS — B37.31 CANDIDIASIS OF VULVA AND VAGINA: ICD-10-CM

## 2017-11-27 RX ORDER — FLUCONAZOLE 150 MG/1
150 TABLET ORAL
Qty: 2 TABLET | Refills: 1 | Status: SHIPPED | OUTPATIENT
Start: 2017-11-27 | End: 2018-03-12

## 2017-11-27 NOTE — PROGRESS NOTES
Seen with son - sinus infection and then always gets yeast infection with this - treating with augmentin.

## 2017-12-15 ENCOUNTER — MYC MEDICAL ADVICE (OUTPATIENT)
Dept: PEDIATRICS | Facility: CLINIC | Age: 57
End: 2017-12-15

## 2017-12-15 DIAGNOSIS — B37.31 YEAST INFECTION OF THE VAGINA: Primary | ICD-10-CM

## 2017-12-15 RX ORDER — FLUCONAZOLE 150 MG/1
150 TABLET ORAL
Qty: 30 TABLET | Refills: 0 | Status: SHIPPED | OUTPATIENT
Start: 2017-12-15 | End: 2018-03-12

## 2017-12-15 NOTE — TELEPHONE ENCOUNTER
Ok for script - I would recommend trying to take it as little as possible - if she can stretch out the doses to longer than 3 days, that would be great.  Fluconazole can be hard on the liver, so I would like her to minimize her use as much as possible.

## 2017-12-15 NOTE — TELEPHONE ENCOUNTER
Patient is on doxycycline for 90 days after an eye procedure. Has had yeast infection due to the antibiotic.     Patient requesting a Diflucan rx, specifically every 3 days x 90 days.     Routing to Dr. Urban to advise on a Diflucan dose while patient is on doxycycline for 90 days to prevent yeast infections.

## 2018-02-26 ENCOUNTER — TRANSFERRED RECORDS (OUTPATIENT)
Dept: HEALTH INFORMATION MANAGEMENT | Facility: CLINIC | Age: 58
End: 2018-02-26

## 2018-03-12 ENCOUNTER — TELEPHONE (OUTPATIENT)
Dept: PEDIATRICS | Facility: CLINIC | Age: 58
End: 2018-03-12

## 2018-03-12 ENCOUNTER — OFFICE VISIT (OUTPATIENT)
Dept: PEDIATRICS | Facility: CLINIC | Age: 58
End: 2018-03-12
Payer: COMMERCIAL

## 2018-03-12 VITALS
SYSTOLIC BLOOD PRESSURE: 124 MMHG | BODY MASS INDEX: 42.49 KG/M2 | HEART RATE: 79 BPM | WEIGHT: 255 LBS | TEMPERATURE: 98.4 F | OXYGEN SATURATION: 98 % | DIASTOLIC BLOOD PRESSURE: 80 MMHG | HEIGHT: 65 IN

## 2018-03-12 DIAGNOSIS — Z00.00 ROUTINE GENERAL MEDICAL EXAMINATION AT A HEALTH CARE FACILITY: Primary | ICD-10-CM

## 2018-03-12 DIAGNOSIS — R11.0 NAUSEA: ICD-10-CM

## 2018-03-12 DIAGNOSIS — Z23 NEED FOR PROPHYLACTIC VACCINATION WITH TETANUS-DIPHTHERIA (TD): ICD-10-CM

## 2018-03-12 DIAGNOSIS — L50.9 URTICARIA: ICD-10-CM

## 2018-03-12 DIAGNOSIS — K21.9 GASTROESOPHAGEAL REFLUX DISEASE, ESOPHAGITIS PRESENCE NOT SPECIFIED: ICD-10-CM

## 2018-03-12 DIAGNOSIS — E66.01 MORBID OBESITY (H): ICD-10-CM

## 2018-03-12 DIAGNOSIS — B96.89 BACTERIAL VAGINOSIS: ICD-10-CM

## 2018-03-12 DIAGNOSIS — L25.9 CONTACT DERMATITIS, UNSPECIFIED CONTACT DERMATITIS TYPE, UNSPECIFIED TRIGGER: ICD-10-CM

## 2018-03-12 DIAGNOSIS — T78.3XXS ANGIOEDEMA, SEQUELA: ICD-10-CM

## 2018-03-12 DIAGNOSIS — R00.2 PALPITATIONS: ICD-10-CM

## 2018-03-12 DIAGNOSIS — I49.3 PVC'S (PREMATURE VENTRICULAR CONTRACTIONS): ICD-10-CM

## 2018-03-12 DIAGNOSIS — N94.9 VAGINAL DISCOMFORT: ICD-10-CM

## 2018-03-12 DIAGNOSIS — J45.30 MILD PERSISTENT ASTHMA WITHOUT COMPLICATION: ICD-10-CM

## 2018-03-12 DIAGNOSIS — N76.0 BACTERIAL VAGINOSIS: ICD-10-CM

## 2018-03-12 LAB
ALBUMIN SERPL-MCNC: 3.4 G/DL (ref 3.4–5)
ALP SERPL-CCNC: 80 U/L (ref 40–150)
ALT SERPL W P-5'-P-CCNC: 28 U/L (ref 0–50)
ANION GAP SERPL CALCULATED.3IONS-SCNC: 5 MMOL/L (ref 3–14)
AST SERPL W P-5'-P-CCNC: 16 U/L (ref 0–45)
BILIRUB SERPL-MCNC: 0.6 MG/DL (ref 0.2–1.3)
BUN SERPL-MCNC: 11 MG/DL (ref 7–30)
CALCIUM SERPL-MCNC: 9.5 MG/DL (ref 8.5–10.1)
CHLORIDE SERPL-SCNC: 108 MMOL/L (ref 94–109)
CHOLEST SERPL-MCNC: 185 MG/DL
CO2 SERPL-SCNC: 30 MMOL/L (ref 20–32)
CREAT SERPL-MCNC: 0.76 MG/DL (ref 0.52–1.04)
ERYTHROCYTE [DISTWIDTH] IN BLOOD BY AUTOMATED COUNT: 13.5 % (ref 10–15)
GFR SERPL CREATININE-BSD FRML MDRD: 77 ML/MIN/1.7M2
GLUCOSE SERPL-MCNC: 101 MG/DL (ref 70–99)
HCT VFR BLD AUTO: 39.9 % (ref 35–47)
HDLC SERPL-MCNC: 37 MG/DL
HGB BLD-MCNC: 12.7 G/DL (ref 11.7–15.7)
LDLC SERPL CALC-MCNC: 123 MG/DL
MCH RBC QN AUTO: 27.7 PG (ref 26.5–33)
MCHC RBC AUTO-ENTMCNC: 31.8 G/DL (ref 31.5–36.5)
MCV RBC AUTO: 87 FL (ref 78–100)
NONHDLC SERPL-MCNC: 148 MG/DL
PLATELET # BLD AUTO: 268 10E9/L (ref 150–450)
POTASSIUM SERPL-SCNC: 3.8 MMOL/L (ref 3.4–5.3)
PROT SERPL-MCNC: 7.1 G/DL (ref 6.8–8.8)
RBC # BLD AUTO: 4.59 10E12/L (ref 3.8–5.2)
SODIUM SERPL-SCNC: 143 MMOL/L (ref 133–144)
SPECIMEN SOURCE: ABNORMAL
TRIGL SERPL-MCNC: 124 MG/DL
TSH SERPL DL<=0.005 MIU/L-ACNC: 1.97 MU/L (ref 0.4–4)
WBC # BLD AUTO: 8.9 10E9/L (ref 4–11)
WET PREP SPEC: ABNORMAL

## 2018-03-12 PROCEDURE — 85027 COMPLETE CBC AUTOMATED: CPT | Performed by: PEDIATRICS

## 2018-03-12 PROCEDURE — 99213 OFFICE O/P EST LOW 20 MIN: CPT | Mod: 25 | Performed by: PEDIATRICS

## 2018-03-12 PROCEDURE — 90715 TDAP VACCINE 7 YRS/> IM: CPT | Performed by: PEDIATRICS

## 2018-03-12 PROCEDURE — 90471 IMMUNIZATION ADMIN: CPT | Performed by: PEDIATRICS

## 2018-03-12 PROCEDURE — 80061 LIPID PANEL: CPT | Performed by: PEDIATRICS

## 2018-03-12 PROCEDURE — 87210 SMEAR WET MOUNT SALINE/INK: CPT | Performed by: PEDIATRICS

## 2018-03-12 PROCEDURE — 99396 PREV VISIT EST AGE 40-64: CPT | Mod: 25 | Performed by: PEDIATRICS

## 2018-03-12 PROCEDURE — 80053 COMPREHEN METABOLIC PANEL: CPT | Performed by: PEDIATRICS

## 2018-03-12 PROCEDURE — 36415 COLL VENOUS BLD VENIPUNCTURE: CPT | Performed by: PEDIATRICS

## 2018-03-12 PROCEDURE — 84443 ASSAY THYROID STIM HORMONE: CPT | Performed by: PEDIATRICS

## 2018-03-12 RX ORDER — TRIAMCINOLONE ACETONIDE 5 MG/G
CREAM TOPICAL
Qty: 30 G | Refills: 3 | Status: SHIPPED | OUTPATIENT
Start: 2018-03-12 | End: 2019-03-14

## 2018-03-12 RX ORDER — ESOMEPRAZOLE MAGNESIUM 40 MG/1
40 CAPSULE, DELAYED RELEASE ORAL
Qty: 30 CAPSULE | Refills: 11 | Status: SHIPPED | OUTPATIENT
Start: 2018-03-12 | End: 2019-03-14

## 2018-03-12 RX ORDER — METRONIDAZOLE 7.5 MG/G
1 GEL VAGINAL 2 TIMES DAILY
Qty: 70 G | Refills: 0 | Status: SHIPPED | OUTPATIENT
Start: 2018-03-12 | End: 2018-03-17

## 2018-03-12 RX ORDER — ONDANSETRON 4 MG/1
4 TABLET, ORALLY DISINTEGRATING ORAL EVERY 6 HOURS PRN
Qty: 20 TABLET | Refills: 11 | Status: SHIPPED | OUTPATIENT
Start: 2018-03-12 | End: 2019-03-14

## 2018-03-12 NOTE — MR AVS SNAPSHOT
After Visit Summary   3/12/2018    Stephanie Mendez    MRN: [id number]           Patient Information     Date Of Birth          1960        Visit Information        Provider Department      3/12/2018 8:20 AM Nancy Urban MD [facility]        Today's Diagnoses     Routine general medical examination at a health care facility    -  1    Need for prophylactic vaccination with tetanus-diphtheria (TD)        Mild persistent asthma without complication        Ventral hernia without obstruction or gangrene        Nausea        Contact dermatitis, unspecified contact dermatitis type, unspecified trigger        Gastroesophageal reflux disease, esophagitis presence not specified        Palpitations        Vaginal discomfort          Care Instructions    Vivien Logan - [facility] - [place]    Lab work today    Additional vaginal testing today to see if a yeast infection or BV is contributing to your symptoms.  Try new lubricants - let me know if not improving and we can have you see Ob/Gyn    Tetanus shot today    Mammogram after 7/9/18        Preventive Health Recommendations  Female Ages 50 - 64    Yearly exam: See your health care provider every year in order to  o Review health changes.   o Discuss preventive care.    o Review your medicines if your doctor has prescribed any.      Get a Pap test every three years (unless you have an abnormal result and your provider advises testing more often).    If you get Pap tests with HPV test, you only need to test every 5 years, unless you have an abnormal result.     You do not need a Pap test if your uterus was removed (hysterectomy) and you have not had cancer.    You should be tested each year for STDs (sexually transmitted diseases) if you're at risk.     Have a mammogram every 1 to 2 years.    Have a colonoscopy at age 50, or have a yearly FIT test (stool test). These exams screen for colon cancer.      Have a cholesterol  test every 5 years, or more often if advised.    Have a diabetes test (fasting glucose) every three years. If you are at risk for diabetes, you should have this test more often.     If you are at risk for osteoporosis (brittle bone disease), think about having a bone density scan (DEXA).    Shots: Get a flu shot each year. Get a tetanus shot every 10 years.    Nutrition:     Eat at least 5 servings of fruits and vegetables each day.    Eat whole-grain bread, whole-wheat pasta and brown rice instead of white grains and rice.    Talk to your provider about Calcium and Vitamin D.     Lifestyle    Exercise at least 150 minutes a week (30 minutes a day, 5 days a week). This will help you control your weight and prevent disease.    Limit alcohol to one drink per day.    No smoking.     Wear sunscreen to prevent skin cancer.     See your dentist every six months for an exam and cleaning.    See your eye doctor every 1 to 2 years.            Follow-ups after your visit        Your next 10 appointments already scheduled     May 04, 2018  8:30 AM CDT   Return Visit with Ritesh Hillman MD   [facility] ([facility])    [address] [address] [address] [phone]              Who to contact     If you have questions or need follow up information about today's clinic visit or your schedule please contact [facility] [place] directly at [phone].  Normal or non-critical lab and imaging results will be communicated to you by MyChart, letter or phone within 4 business days after the clinic has received the results. If you do not hear from us within 7 days, please contact the clinic through MyChart or phone. If you have a critical or abnormal lab result, we will notify you by phone as soon as possible.  Submit refill requests through [employer] or call your pharmacy and they will forward the refill request to us. Please allow 3 business days for  "your refill to be completed.          Additional Information About Your Visit        MyChart Information     [employer] gives you secure access to your electronic health record. If you see a primary care provider, you can also send messages to your care team and make appointments. If you have questions, please call your primary care clinic.  If you do not have a primary care provider, please call [phone] and they will assist you.        Care EveryWhere ID     This is your Care EveryWhere ID. This could be used by other organizations to access your [facility] medical records  [id number]        Your Vitals Were     Pulse Temperature Height Last Period Pulse Oximetry BMI (Body Mass Index)    79 98.4  F (36.9  C) (Tympanic) 5' 5\" (1.651 m) 06/04/2010 98% 42.43 kg/m2       Blood Pressure from Last 3 Encounters:   03/12/18 124/80   06/23/17 126/82   05/01/17 144/84    Weight from Last 3 Encounters:   03/12/18 255 lb (115.7 kg)   06/23/17 257 lb 7 oz (116.8 kg)   05/01/17 261 lb (118.4 kg)              We Performed the Following          ADMIN VACCINE, FIRST     CBC with platelets     Comprehensive metabolic panel     Lipid panel reflex to direct LDL Fasting     TDAP VACCINE (ADACEL)     TSH with free T4 reflex     Wet prep          Where to get your medicines      These medications were sent to [facility]/pharmacy #[id number] - [place], [place] - [address] AT [facility] OF [address], [place] [address]     Phone:  [phone]     esomeprazole 40 MG CR capsule    ondansetron 4 MG ODT tab    triamcinolone 0.5 % cream          Primary Care Provider Office Phone # Fax #    Nancy Urban [phone]       [facility] [address] [place] [address]        Equal Access to Services     [facility]: Hadii caleb Sanchez, waaxda luqadaha, qaybta kaalmada giuseppe, maryam rubin. So [facility] [phone].    ATENCIÓN: Si leslee mcclure stoddard " disposición servicios gratuitos de asistencia lingüística. Kristine mauricio [phone].    We comply with applicable federal civil rights laws and [place] laws. We do not discriminate on the basis of race, color, national origin, age, disability, sex, sexual orientation, or gender identity.            Thank you!     Thank you for choosing [facility] [place]  for your care. Our goal is always to provide you with excellent care. Hearing back from our patients is one way we can continue to improve our services. Please take a few minutes to complete the written survey that you may receive in the mail after your visit with us. Thank you!             Your Updated Medication List - Protect others around you: Learn how to safely use, store and throw away your medicines at www.disposemymeds.org.          This list is accurate as of 3/12/18  9:23 AM.  Always use your most recent med list.                   Brand Name Dispense Instructions for use Diagnosis    albuterol (2.5 MG/3ML) 0.083% neb solution     90 vial    Take 1 vial (2.5 mg) by nebulization every 4 hours as needed for shortness of breath / dyspnea or wheezing    Mild persistent asthma without complication       albuterol 90 MCG/ACT inhaler     1    1-2 puffs Q 4-6 hrs prn    Mild intermittent asthma       BENADRYL 25 MG capsule   Generic drug:  diphenhydrAMINE      Take 25 mg by mouth every 6 hours as needed.        CALCIUM + D PO      600mg bid        CENTRUM Tabs      1 TABLET DAILY        doxepin 10 MG capsule    SINEquan     2 tablets twice daily    Angioedema       DULERA IN           EPI E-Z PEN REMEDIOS 1:1000  IJ     2    1 TIME ONLY    Allergy to other foods       esomeprazole 40 MG CR capsule    nexIUM    30 capsule    Take 1 capsule (40 mg) by mouth every morning (before breakfast) Take 30-60 minutes before a eating.    Gastroesophageal reflux disease, esophagitis presence not specified       glucagon 1 MG kit     1 mg    Inject 1 mg into the muscle once  for 1 dose    Urticaria       ibuprofen 600 MG tablet    ADVIL/MOTRIN    30 tablet    Take 1 tablet by mouth every 6 hours as needed for other (inflammatory pain).    Ventral hernia       IRON RELEASE TBCR 160 MG OR      1 TABLET DAILY        metoprolol succinate 100 MG 24 hr tablet    TOPROL-XL    90 tablet    Take 1 tablet (100 mg) by mouth daily    PVC's (premature ventricular contractions)       ondansetron 4 MG ODT tab    ZOFRAN-ODT    20 tablet    Take 1 tablet (4 mg) by mouth every 6 hours as needed for nausea    Ventral hernia without obstruction or gangrene, Nausea       PATANOL 0.1 % ophthalmic solution   Generic drug:  olopatadine      Place 2 drops into both eyes daily as needed        ranitidine 150 MG tablet    ZANTAC    3 MONTHS    ONE TABLET TWICE DAILY        SINGULAIR 10 MG tablet   Generic drug:  montelukast      Take 10 mg by mouth daily        triamcinolone 0.5 % cream    KENALOG    30 g    Apply sparingly to affected area three times daily.    Contact dermatitis, unspecified contact dermatitis type, unspecified trigger       Vitamin D (Cholecalciferol) 1000 UNITS Tabs      Take by mouth daily        XOLAIR SC      Twice per month

## 2018-03-12 NOTE — PROGRESS NOTES
SUBJECTIVE:   CC: Stephanie Mendez is an 57 year old woman who presents for preventive health visit.     Physical   Annual:     Getting at least 3 servings of Calcium per day::  Yes    Bi-annual eye exam::  Yes    Dental care twice a year::  Yes    Sleep apnea or symptoms of sleep apnea::  None    Diet::  Other    Frequency of exercise::  2-3 days/week    Duration of exercise::  15-30 minutes    Taking medications regularly::  Yes    Medication side effects::  None    Additional concerns today::  YES                  Today's PHQ-2 Score:   PHQ-2 ( 1999 [employer]) 3/12/2018   Q1: Little interest or pleasure in doing things 0   Q2: Feeling down, depressed or hopeless 0   PHQ-2 Score 0   Q1: Little interest or pleasure in doing things Not at all   Q2: Feeling down, depressed or hopeless Not at all   PHQ-2 Score 0       Abuse: Current or Past(Physical, Sexual or Emotional)- No  Do you feel safe in your environment - Yes    Social History   Substance Use Topics     Smoking status: Never Smoker     Smokeless tobacco: Never Used     Alcohol use No      Comment: RARE     No flowsheet data found.No flowsheet data found.    Reviewed orders with patient.  Reviewed health maintenance and updated orders accordingly - Yes    Patient over age 50, mutual decision to screen reflected in health maintenance.    Pertinent mammograms are reviewed under the imaging tab.  History of abnormal Pap smear: Status post hysterectomy. Health Maintenance and Surgical History updated.    Reviewed and updated as needed this visit by clinical staff  Tobacco  Allergies  Meds         Reviewed and updated as needed this visit by Provider            Asthma - worsening control with recent home remodeling.  Following with Dr Mercado.  ACT today of 6.    PVCs - following with Dr Hillman.  Stopped glucosamine and PVCs got better.  Taking metoprolol prn - limited in how often can take it due to asthma.  Overall, symptoms well controlled.    Had seen   "Hannah last year and prescribed phentermine - didn't start taking due to concern for side effects.  Wt Readings from Last 4 Encounters:   03/12/18 255 lb (115.7 kg)   06/23/17 257 lb 7 oz (116.8 kg)   05/01/17 261 lb (118.4 kg)   03/28/17 260 lb (117.9 kg)     Weight down slightly.    On Xolair for asthma and allergies.  Follows closely with Dr Mercado and has seen him for current asthma exacerbation brought on by environmental exposures -  has been redoing their popcorn ceilings.      Angioedema, urticaria - Xolair not working as well as it had previously.  Onion and sunflower allergies are much worse.   Difficulty tolerating many foods due to multiple allergies.  Has most recently discovered that ice cream and chocolate chips are easy to tolerate - healthy foods often set her off.  She has had multiple nutritionists over the years - her particular problems make it very difficult to eat well.   Uses zofran for nausea with good success.    Vaginal bleeding after intercourse over the last few months and having a lot of vaginal discomfort with intercourse.  S/p hysterectomy, cervix also removed.    Skin cancer history - due to see dermatology - hasn't noticed any sudden changes.  Intermittent use of triamcinolone with good improvement in intermittent dry, scaling skin symptoms.    GERD - well controlled on current medication      Review of Systems   ROS: 10 point ROS neg other than the symptoms noted above in the HPI.       OBJECTIVE:   /80  Pulse 79  Temp 98.4  F (36.9  C) (Tympanic)  Ht 5' 5\" (1.651 m)  Wt 255 lb (115.7 kg)  LMP 06/04/2010  SpO2 98%  BMI 42.43 kg/m2  Physical Exam  GENERAL APPEARANCE: healthy, alert and no distress  EYES: Eyes grossly normal to inspection, PERRL and conjunctivae and sclerae normal  HENT: ear canals and TM's normal, nose and mouth without ulcers or lesions, oropharynx clear and oral mucous membranes moist  NECK: no adenopathy, no asymmetry, masses, or scars and " thyroid normal to palpation  RESP: lungs clear to auscultation - no rales, rhonchi or wheezes  BREAST: normal without masses, tenderness or nipple discharge and no palpable axillary masses or adenopathy  CV: regular rate and rhythm, normal S1 S2, no S3 or S4, no murmur, click or rub, no peripheral edema and peripheral pulses strong  ABDOMEN: soft, nontender, no hepatosplenomegaly, no masses and bowel sounds normal   (female): normal female external genitalia, normal urethral meatus, vaginal mucosal atrophy noted, no bruising or bleeding, no growths or polyps  MS: no musculoskeletal defects are noted and gait is age appropriate without ataxia  SKIN: no suspicious lesions or rashes  NEURO: Normal strength and tone, sensory exam grossly normal, mentation intact and speech normal  PSYCH: mentation appears normal and affect normal/bright    Wet prep: + for clue cells    ASSESSMENT/PLAN:       ICD-10-CM    1. Routine general medical examination at a health care facility Z00.00 TDAP VACCINE (ADACEL)          ADMIN VACCINE, FIRST     Lipid panel reflex to direct LDL Fasting     Comprehensive metabolic panel     CBC with platelets     TSH with free T4 reflex   2. Morbid obesity (H) E66.01 Lipid panel reflex to direct LDL Fasting     Comprehensive metabolic panel     TSH with free T4 reflex  Encouraged in activity and healthy eating when food allergies allow   3. Mild persistent asthma without complication J45.30 Followed closely by Dr Mercado, normal exam today   4. Nausea R11.0 ondansetron (ZOFRAN-ODT) 4 MG ODT tab   5. Contact dermatitis, unspecified contact dermatitis type, unspecified trigger L25.9 triamcinolone (KENALOG) 0.5 % cream   6. Gastroesophageal reflux disease, esophagitis presence not specified K21.9 CBC with platelets     esomeprazole (NEXIUM) 40 MG CR capsule  Well controlled, continue current medications     7. Palpitations R00.2 Comprehensive metabolic panel     TSH with free T4 reflex  Improved off of  "glucosamine - follow labs and symptoms   8. Vaginal discomfort N94.9 Wet prep   9. Bacterial vaginosis N76.0 metroNIDAZOLE (METROGEL) 0.75 % vaginal gel  BV on wet prep today - patient to alert me if not improving with treatment    B96.89    10. Angioedema, sequela T78.3XXS CBC with platelets   11. PVC's (premature ventricular contractions) I49.3 Comprehensive metabolic panel     TSH with free T4 reflex  Improved - follow   12. Urticaria L50.9 Follows with Dr Mercado   13. Need for prophylactic vaccination with tetanus-diphtheria (TD) Z23 TDAP VACCINE (ADACEL)          ADMIN VACCINE, FIRST       COUNSELING:  Special attention given to:        Regular exercise       Healthy diet/nutrition       Vision screening       Immunizations    Vaccinated for: TDAP          BP Screening:   Last 3 BP Readings:    BP Readings from Last 3 Encounters:   03/12/18 124/80   06/23/17 126/82   05/01/17 144/84       The following was recommended to the patient:  Re-screen BP within a year and recommended lifestyle modifications     reports that she has never smoked. She has never used smokeless tobacco.    Estimated body mass index is 42.43 kg/(m^2) as calculated from the following:    Height as of this encounter: 5' 5\" (1.651 m).    Weight as of this encounter: 255 lb (115.7 kg).   Weight management plan: Discussed healthy diet and exercise guidelines and patient will follow up in 12 months in clinic to re-evaluate.    Counseling Resources:  ATP IV Guidelines  Pooled Cohorts Equation Calculator  Breast Cancer Risk Calculator  FRAX Risk Assessment  ICSI Preventive Guidelines  Dietary Guidelines for Americans, 2010  USDA's MyPlate  ASA Prophylaxis  Lung CA Screening    Nancy Urban MD  [facility] [place]  "

## 2018-03-12 NOTE — TELEPHONE ENCOUNTER
Prior Authorization Retail Medication Request    Medication/Dose: esomeprazole (NEXIUM) 40 MG CR capsule    ICD code (if different than what is on RX): same as Rx; Gastroesophageal reflux disease, esophagitis presence not specified [K21.9]     Previously Tried and Failed: RANITIDINE  MG OR TABS, omeprazole (PRILOSEC) 20 MG capsule AND 40 mg capsule     Rationale: pt has been taking esomeprazole (NEXIUM) 40 MG CR capsule for over a year and has stabilized GERD symptoms. Past medications were failed as they did not help symptoms.     Insurance Name: [employer]   Insurance ID: [id number]      Pharmacy Information (if different than what is on RX)  Same as RX in medication section    Ngozi Carrera MA

## 2018-03-12 NOTE — PATIENT INSTRUCTIONS
Vivien Logan - [facility] - [place]    Lab work today    Additional vaginal testing today to see if a yeast infection or BV is contributing to your symptoms.  Try new lubricants - let me know if not improving and we can have you see Ob/Gyn    Tetanus shot today    Mammogram after 7/9/18        Preventive Health Recommendations  Female Ages 50 - 64    Yearly exam: See your health care provider every year in order to  o Review health changes.   o Discuss preventive care.    o Review your medicines if your doctor has prescribed any.      Get a Pap test every three years (unless you have an abnormal result and your provider advises testing more often).    If you get Pap tests with HPV test, you only need to test every 5 years, unless you have an abnormal result.     You do not need a Pap test if your uterus was removed (hysterectomy) and you have not had cancer.    You should be tested each year for STDs (sexually transmitted diseases) if you're at risk.     Have a mammogram every 1 to 2 years.    Have a colonoscopy at age 50, or have a yearly FIT test (stool test). These exams screen for colon cancer.      Have a cholesterol test every 5 years, or more often if advised.    Have a diabetes test (fasting glucose) every three years. If you are at risk for diabetes, you should have this test more often.     If you are at risk for osteoporosis (brittle bone disease), think about having a bone density scan (DEXA).    Shots: Get a flu shot each year. Get a tetanus shot every 10 years.    Nutrition:     Eat at least 5 servings of fruits and vegetables each day.    Eat whole-grain bread, whole-wheat pasta and brown rice instead of white grains and rice.    Talk to your provider about Calcium and Vitamin D.     Lifestyle    Exercise at least 150 minutes a week (30 minutes a day, 5 days a week). This will help you control your weight and prevent disease.    Limit alcohol to one drink per day.    No smoking.     Wear  sunscreen to prevent skin cancer.     See your dentist every six months for an exam and cleaning.    See your eye doctor every 1 to 2 years.

## 2018-03-13 ENCOUNTER — TRANSFERRED RECORDS (OUTPATIENT)
Dept: HEALTH INFORMATION MANAGEMENT | Facility: CLINIC | Age: 58
End: 2018-03-13

## 2018-03-13 ASSESSMENT — ASTHMA QUESTIONNAIRES: ACT_TOTALSCORE: 6

## 2018-03-19 NOTE — TELEPHONE ENCOUNTER
PA Initiation    Medication: esomeprazole (NEXIUM) 40 MG CR capsule - INITIATED  Insurance Company: [employer] - Phone [phone] Fax [phone]  Pharmacy Filling the Rx: [facility]/PHARMACY #[id number] - [place], [place] - [address] AT [facility]  Filling Pharmacy Phone: [phone]  Filling Pharmacy Fax:    Start Date: 3/19/2018

## 2018-03-20 NOTE — TELEPHONE ENCOUNTER
Prior Authorization Approval    Authorization Effective Date: 3/19/2018  Authorization Expiration Date: 3/19/2019  Medication: esomeprazole (NEXIUM) 40 MG CR capsule - APPROVED  Approved Dose/Quantity: 30 for 30 days   Reference #:     Insurance Company: [employer] - Phone [phone] Fax [phone]  Expected CoPay: $20.00     CoPay Card Available:      Foundation Assistance Needed:    Which Pharmacy is filling the prescription (Not needed for infusion/clinic administered): [facility]/PHARMACY #[id number] - [place], [place] - [address] AT [facility]  Pharmacy Notified: Yes  Patient Notified: Yes

## 2018-05-04 ENCOUNTER — OFFICE VISIT (OUTPATIENT)
Dept: CARDIOLOGY | Facility: CLINIC | Age: 58
End: 2018-05-04
Attending: NURSE PRACTITIONER
Payer: COMMERCIAL

## 2018-05-04 VITALS
DIASTOLIC BLOOD PRESSURE: 84 MMHG | HEIGHT: 65 IN | WEIGHT: 262 LBS | HEART RATE: 80 BPM | BODY MASS INDEX: 43.65 KG/M2 | SYSTOLIC BLOOD PRESSURE: 130 MMHG

## 2018-05-04 DIAGNOSIS — I49.3 PVC'S (PREMATURE VENTRICULAR CONTRACTIONS): ICD-10-CM

## 2018-05-04 PROCEDURE — 99213 OFFICE O/P EST LOW 20 MIN: CPT | Performed by: INTERNAL MEDICINE

## 2018-05-04 NOTE — LETTER
5/4/2018    Nancy Urban MD  [address] [place] [address]    RE: Stephanie Mendez       Dear Colleague,    I had the pleasure of seeing Stephanie Mendez in the [facility].    HPI and Plan:   Suly is seen back in follow-up of her history of problematic/very symptomatic and very frequent premature ventricular contractions. In the past, these have been very bothersome and kept her from sleeping well, awaking her frequently during the night.  Also sometimes interfere with work during the day.  She tried controlling them with short acting Inderal in escalating doses and frequency without much help.  I eventually switched her to metoprolol succinate.    which she took every night and sporadically during the day.   Overall she felt much better on that regimen.  She  was having no orthostasis, syncope, sustained tachycardia, exacerbation of asthma symptoms, or new symptoms otherwise.  However, her allergist, who treated for multiple allergies and sometimes anaphylaxis suggests that she should not take beta blockers because they might mitigate any benefit of her EpiPen, although she has never had to use the EpiPen.  This caused her some conflicts.  She actually saw EP consult about possible ablation although it was not felt to be other than a very last option.  She was tried on calcium channel antagonists but this did not control her symptoms.    Remarkably, I then saw her  at her request because she was having very frequent ectopy although essentially asymptomatic.  After seen him she was wondering why they both seem to have this problem.  She went to her medication lists and stated the only medication he took an common was a glucosamine supplement from [employer].  She subsequently stopped the glucosamine supplement and states since that time she has been unaware of any of her ectopy.    I examined her carefully today and over 160 beats she did not have a single  premature beat, where is at baseline she had more than 20% of her beats being premature/PVCs.    Otherwise she has been without any dyspnea, edema, orthopnea, dizziness, syncope, or sustained tachycardia or symptoms of tachycardia.       Previous evaluation revealed normal renal function and normal potassium and sodium, magnesium and calcium, and normal TSH. A chest x-ray was read as normal. Echocardiogram showed normal ejection fraction. There is evidence of mild left atrial enlargement. Left ventricular hypertrophy was reported, the wall thickness measurements from 1.3 cm but the LV size was relatively small at 4.5 cm, so whether hypertrophy actually present unclear.   Subsequent evaluation with a stress echo showed no evidence of ischemia. In addition there was suppression/amelioration of her ectopy during exercise and higher heart rates. There is no aggravation of ventricular arrhythmia with exercise. A cardiac MRI showed no evidence of infiltrative cardiomyopathy, ARVC, or hypertrophic cardiomyopathy. LV mass was normal. There is slight increased wall thickness just at the base of the septum at 1.3 cm, otherwise normal wall thickness.         There is no family history of sudden death, syncope or near syncope, symptomatic arrhythmias, or hypertrophic or other cardiomyopathy. She has not had nausea vomiting diarrhea or other GI symptoms other than the belching.   However those have been relatively stable and she began an exercise program earlier this fall and was having improved stamina from this.     Exam today notes regular rhythm with no premature beats.  Baseline exams typically noted frequent PVCsranging from one every 10th to one every fourth beat.         Impression/plan  1-frequent ventricular ectopy, unifocal.  It is now asymptomatic and remarkably improved after stopping her supplement.  Therefore the summer do no further evaluation as long as(ectopy remains improved.  I will not give her a follow-up  appointment at this time but she will return if further questions or issues arise.    More than 50% of this 15 minute face-to-face visit was counseling/coordination of care.    No orders of the defined types were placed in this encounter.      No orders of the defined types were placed in this encounter.      There are no discontinued medications.      Encounter Diagnosis   Name Primary?     PVC's (premature ventricular contractions)        CURRENT MEDICATIONS:  Current Outpatient Prescriptions   Medication Sig Dispense Refill     albuterol (2.5 MG/3ML) 0.083% neb solution Take 1 vial (2.5 mg) by nebulization every 4 hours as needed for shortness of breath / dyspnea or wheezing 90 vial 3     ALBUTEROL 90 MCG/ACT IN AERS 1-2 puffs Q 4-6 hrs prn 1 1 year     CALCIUM + D OR 600mg bid       CENTRUM OR TABS 1 TABLET DAILY       diphenhydrAMINE (BENADRYL) 25 MG capsule Take 25 mg by mouth every 6 hours as needed.       DoxePIN (SINEQUAN) 10 MG capsule Can take 2 three times a day if neaded       EPI E-Z PEN REMEDIOS 1:1000  IJ 1 TIME ONLY 2 3     esomeprazole (NEXIUM) 40 MG CR capsule Take 1 capsule (40 mg) by mouth every morning (before breakfast) Take 30-60 minutes before a eating. 30 capsule 11     glucagon 1 MG kit Inject 1 mg into the muscle once for 1 dose 1 mg 0     ibuprofen (ADVIL,MOTRIN) 600 MG tablet Take 1 tablet by mouth every 6 hours as needed for other (inflammatory pain). 30 tablet      IRON RELEASE TBCR 160 MG OR 1 TABLET DAILY       metoprolol (TOPROL-XL) 100 MG 24 hr tablet Take 1 tablet (100 mg) by mouth daily 90 tablet 3     Mometasone Furo-Formoterol Fum (DULERA IN)        montelukast (SINGULAIR) 10 MG tablet Take 10 mg by mouth daily        olopatadine (PATANOL) 0.1 % ophthalmic solution Place 2 drops into both eyes daily as needed        Omalizumab (XOLAIR SC) Twice per month       ondansetron (ZOFRAN-ODT) 4 MG ODT tab Take 1 tablet (4 mg) by mouth every 6 hours as needed for nausea 20 tablet 11      RANITIDINE  MG OR TABS ONE TABLET TWICE DAILY 3 MONTHS 3     triamcinolone (KENALOG) 0.5 % cream Apply sparingly to affected area three times daily. 30 g 3     Vitamin D, Cholecalciferol, 1000 UNITS TABS Take by mouth daily          ALLERGIES     Allergies   Allergen Reactions     Toradol Anaphylaxis     Chicken Allergy      hives     Dust Mites      runny, stuffy nose. Gets fungal sinus inf.     Food      FRESH VEGETABLES (COOKED IS OK EXCEPT ONIONS AND BASAL)     Fruit Extracts      Cannot eat raw fruits and vegetables but can eat them cooked     Lactose      intolerant     Latex Hives     Mold      same as dust     No Clinical Screening - See Comments Hives     BASIL     Onion Hives     Oxycodone Difficulty breathing     Relieved with benadryl 50mg     Sunflower Oil Hives     OR SEEDS     Trees      same as dust     Glucosamine Complex [Glucosamine] Palpitations     Aggravates frequent PVC       PAST MEDICAL HISTORY:  Past Medical History:   Diagnosis Date     Allergic rhinitis, cause unspecified      Allergy, food      Anemia, unspecified      Chronic idiopathic urticaria      Chronic maxillary sinusitis     recurrent fungal infection (remote)     Esophageal reflux      Irregular heart beat      Mild intermittent asthma      Murmur, heart     12/1/2015 3/6 systolic murmur heard - echo ordered     Personal history of other malignant neoplasm of skin     Basal cell     PONV (postoperative nausea and vomiting)      PVC (premature ventricular contraction) 12/2015 12/2015 Holter - SR (HR ) with PVC's and SVPB's       PAST SURGICAL HISTORY:  Past Surgical History:   Procedure Laterality Date     C NONSPECIFIC PROCEDURE  1998    hemorrhoidectomy     C NONSPECIFIC PROCEDURE  82    salivary gland resection rt side     C NONSPECIFIC PROCEDURE  1998    hemorrhoidectomy     C NONSPECIFIC PROCEDURE  2000    sinus surgery     HC ABLATION, ENDOMETRIAL, THERMAL, W/O HYSTEROSCOPIC GUIDANCE  7/10     HEPATECTOMY  PARTIAL Left 2/16/2016    Procedure: HEPATECTOMY PARTIAL;  Surgeon: Kevin Howard MD;  Location: [facility] OR     HYSTERECTOMY, PAP NO LONGER INDICATED  8/2010     LAPAROSCOPIC HEPATECTOMY PARTIAL Left 2/16/2016    Procedure: LAPAROSCOPIC HEPATECTOMY PARTIAL;  Surgeon: Kevin Howard MD;  Location: [facility] OR     LAPAROSCOPY DIAGNOSTIC (GENERAL) N/A 2/16/2016    Procedure: LAPAROSCOPY DIAGNOSTIC (GENERAL);  Surgeon: Kevin Howard MD;  Location: [facility] OR     RECONSTRUCT ABDOMINAL WALL  10/23/2012    Procedure: RECONSTRUCT ABDOMINAL WALL;  ABDOMINAL WALL RECONSTRUCTION WITH MESH ;  Surgeon: Romi Renteria MD;  Location: [facility] OR     SALPINGO OOPHORECTOMY,R/L/FAUSTINA      Salpingo Oophorectomy, RT/LT/FAUSTINA     SURGICAL HISTORY OF -       partial colectomy (3/4 colon)       FAMILY HISTORY:  Family History   Problem Relation Age of Onset     Cardiovascular Maternal Grandmother      CHF     DIABETES Maternal Grandmother      OSTEOPOROSIS Maternal Grandmother      DIABETES Mother      Hypertension Mother      Lipids Mother      Breast Cancer Mother      CANCER Mother 82     DX this year, 2015- breast cancer     Alzheimer Disease Mother      CEREBROVASCULAR DISEASE Paternal Grandmother      CANCER Father      Skin cancer - BCC     DIABETES Maternal Aunt      Cancer - colorectal Maternal Aunt      Thyroid Disease No family hx of      C.A.D. No family hx of        SOCIAL HISTORY:  Social History     Social History     Marital status:      Spouse name: Ritesh     Number of children: 2     Years of education: 14     Occupational History     Homemaker      Social History Main Topics     Smoking status: Never Smoker     Smokeless tobacco: Never Used     Alcohol use No      Comment: RARE     Drug use: No     Sexual activity: Yes     Partners: Male     Birth control/ protection: Pill, Surgical      Comment:  had vasectomy     Other Topics Concern     Parent/Sibling W/ Cabg, Mi Or Angioplasty Before 65f 55m? No     Caffeine Concern No      "Sleep Concern No     Special Diet Yes     allergy related      Exercise No     some walking      Seat Belt Yes     Social History Narrative    .  Lives with .  2 sons, both with autism.  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        Review of Systems:  Skin:  Negative for       Eyes:  Positive for glasses    ENT:  Negative for      Respiratory:  Positive for cough ashma   Cardiovascular:    edema;Positive for    Gastroenterology: Positive for reflux;heartburn    Genitourinary:         Musculoskeletal:  Negative for      Neurologic:  Positive for numbness or tingling of hands    Psychiatric:  Negative for      Heme/Lymph/Imm:  Positive for allergies    Endocrine:  Negative        Physical Exam:  Vitals: /84  Pulse 80  Ht 1.651 m (5' 5\")  Wt 118.8 kg (262 lb)  LMP 06/04/2010  BMI 43.6 kg/m2    Constitutional:           Skin:             Head:           Eyes:           Lymph:      ENT:           Neck:           Respiratory:            Cardiac:                                                           GI:           Extremities and Muscular Skeletal:                 Neurological:           Psych:             Thank you for allowing me to participate in the care of your patient.    Sincerely,     Ritesh Hillman MD     [facility]    "

## 2018-05-04 NOTE — MR AVS SNAPSHOT
"              After Visit Summary   5/4/2018    Stephanie Mendez    MRN: [id number]           Patient Information     Date Of Birth          1960        Visit Information        Provider Department      5/4/2018 8:30 AM Ritesh Hillman MD [facility]        Today's Diagnoses     PVC's (premature ventricular contractions)           Follow-ups after your visit        Who to contact     If you have questions or need follow up information about today's clinic visit or your schedule please contact [facility] directly at [phone].  Normal or non-critical lab and imaging results will be communicated to you by [employer], letter or phone within 4 business days after the clinic has received the results. If you do not hear from us within 7 days, please contact the clinic through [employer] or phone. If you have a critical or abnormal lab result, we will notify you by phone as soon as possible.  Submit refill requests through [employer] or call your pharmacy and they will forward the refill request to us. Please allow 3 business days for your refill to be completed.          Additional Information About Your Visit        MyChart Information     [facility] gives you secure access to your electronic health record. If you see a primary care provider, you can also send messages to your care team and make appointments. If you have questions, please call your primary care clinic.  If you do not have a primary care provider, please call [phone] and they will assist you.        Care EveryWhere ID     This is your Care EveryWhere ID. This could be used by other organizations to access your [facility] medical records  [id number]        Your Vitals Were     Pulse Height Last Period BMI (Body Mass Index)          80 1.651 m (5' 5\") 06/04/2010 43.6 kg/m2         Blood Pressure from Last 3 Encounters:   05/04/18 130/84   03/12/18 124/80 "   06/23/17 126/82    Weight from Last 3 Encounters:   05/04/18 118.8 kg (262 lb)   03/12/18 115.7 kg (255 lb)   06/23/17 116.8 kg (257 lb 7 oz)              We Performed the Following     Follow-Up with Cardiologist        Primary Care Provider Office Phone # Fax #    Nancy Urban [phone] [address] [place] [address]        Equal Access to Services     [facility] [place] : Hadii aad ku hadasho Soomaali, waaxda luqadaha, qaybta kaalmada adeegyada, waxay idiin hayaan adeeg kharash la'aan ah. So [facility] [phone].    ATENCIÓN: Si habla español, tiene a stoddard disposición servicios gratuitos de asistencia lingüística. [facility] [phone].    We comply with applicable federal civil rights laws and [place] laws. We do not discriminate on the basis of race, color, national origin, age, disability, sex, sexual orientation, or gender identity.            Thank you!     Thank you for choosing [facility]  for your care. Our goal is always to provide you with excellent care. Hearing back from our patients is one way we can continue to improve our services. Please take a few minutes to complete the written survey that you may receive in the mail after your visit with us. Thank you!             Your Updated Medication List - Protect others around you: Learn how to safely use, store and throw away your medicines at www.disposemymeds.org.          This list is accurate as of 5/4/18 11:08 AM.  Always use your most recent med list.                   Brand Name Dispense Instructions for use Diagnosis    albuterol (2.5 MG/3ML) 0.083% neb solution     90 vial    Take 1 vial (2.5 mg) by nebulization every 4 hours as needed for shortness of breath / dyspnea or wheezing    Mild persistent asthma without complication       albuterol 90 MCG/ACT inhaler     1    1-2 puffs Q 4-6 hrs prn    Mild intermittent asthma       BENADRYL 25 MG capsule   Generic  drug:  diphenhydrAMINE      Take 25 mg by mouth every 6 hours as needed.        CALCIUM + D PO      600mg bid        CENTRUM Tabs      1 TABLET DAILY        doxepin 10 MG capsule    SINEquan     Can take 2 three times a day if neaded    Angioedema       DULERA IN           EPI E-Z PEN REMEDIOS 1:1000  IJ     2    1 TIME ONLY    Allergy to other foods       esomeprazole 40 MG CR capsule    nexIUM    30 capsule    Take 1 capsule (40 mg) by mouth every morning (before breakfast) Take 30-60 minutes before a eating.    Gastroesophageal reflux disease, esophagitis presence not specified       glucagon 1 MG kit     1 mg    Inject 1 mg into the muscle once for 1 dose    Urticaria       ibuprofen 600 MG tablet    ADVIL/MOTRIN    30 tablet    Take 1 tablet by mouth every 6 hours as needed for other (inflammatory pain).    Ventral hernia       IRON RELEASE TBCR 160 MG OR      1 TABLET DAILY        metoprolol succinate 100 MG 24 hr tablet    TOPROL-XL    90 tablet    Take 1 tablet (100 mg) by mouth daily    PVC's (premature ventricular contractions)       ondansetron 4 MG ODT tab    ZOFRAN-ODT    20 tablet    Take 1 tablet (4 mg) by mouth every 6 hours as needed for nausea    Nausea       PATANOL 0.1 % ophthalmic solution   Generic drug:  olopatadine      Place 2 drops into both eyes daily as needed        ranitidine 150 MG tablet    ZANTAC    3 MONTHS    ONE TABLET TWICE DAILY        SINGULAIR 10 MG tablet   Generic drug:  montelukast      Take 10 mg by mouth daily        triamcinolone 0.5 % cream    KENALOG    30 g    Apply sparingly to affected area three times daily.    Contact dermatitis, unspecified contact dermatitis type, unspecified trigger       Vitamin D (Cholecalciferol) 1000 units Tabs      Take by mouth daily        XOLAIR SC      Twice per month

## 2018-05-04 NOTE — PROGRESS NOTES
HPI and Plan:   Suly is seen back in follow-up of her history of problematic/very symptomatic and very frequent premature ventricular contractions. In the past, these have been very bothersome and kept her from sleeping well, awaking her frequently during the night.  Also sometimes interfere with work during the day.  She tried controlling them with short acting Inderal in escalating doses and frequency without much help.  I eventually switched her to metoprolol succinate.   which she took every night and sporadically during the day.   Overall she felt much better on that regimen.  She was having no orthostasis, syncope, sustained tachycardia, exacerbation of asthma symptoms, or new symptoms otherwise.  However, her allergist, who treated for multiple allergies and sometimes anaphylaxis suggests that she should not take beta blockers because they might mitigate any benefit of her EpiPen, although she has never had to use the EpiPen.  This caused her some conflicts.  She actually saw EP consult about possible ablation although it was not felt to be other than a very last option.  She was tried on calcium channel antagonists but this did not control her symptoms.    Remarkably, I then saw her  at her request because she was having very frequent ectopy although essentially asymptomatic.  After seen him she was wondering why they both seem to have this problem.  She went to her medication lists and stated the only medication he took an common was a glucosamine supplement from [employer].  She subsequently stopped the glucosamine supplement and states since that time she has been unaware of any of her ectopy.    I examined her carefully today and over 160 beats she did not have a single premature beat, where is at baseline she had more than 20% of her beats being premature/PVCs.    Otherwise she has been without any dyspnea, edema, orthopnea, dizziness, syncope, or sustained tachycardia or symptoms of  tachycardia.       Previous evaluation revealed normal renal function and normal potassium and sodium, magnesium and calcium, and normal TSH. A chest x-ray was read as normal. Echocardiogram showed normal ejection fraction. There is evidence of mild left atrial enlargement. Left ventricular hypertrophy was reported, the wall thickness measurements from 1.3 cm but the LV size was relatively small at 4.5 cm, so whether hypertrophy actually present unclear.   Subsequent evaluation with a stress echo showed no evidence of ischemia. In addition there was suppression/amelioration of her ectopy during exercise and higher heart rates. There is no aggravation of ventricular arrhythmia with exercise. A cardiac MRI showed no evidence of infiltrative cardiomyopathy, ARVC, or hypertrophic cardiomyopathy. LV mass was normal. There is slight increased wall thickness just at the base of the septum at 1.3 cm, otherwise normal wall thickness.         There is no family history of sudden death, syncope or near syncope, symptomatic arrhythmias, or hypertrophic or other cardiomyopathy. She has not had nausea vomiting diarrhea or other GI symptoms other than the belching.   However those have been relatively stable and she began an exercise program earlier this fall and was having improved stamina from this.     Exam today notes regular rhythm with no premature beats.  Baseline exams typically noted frequent PVCsranging from one every 10th to one every fourth beat.         Impression/plan  1-frequent ventricular ectopy, unifocal.  It is now asymptomatic and remarkably improved after stopping her supplement.  Therefore the summer do no further evaluation as long as(ectopy remains improved.  I will not give her a follow-up appointment at this time but she will return if further questions or issues arise.    More than 50% of this 15 minute face-to-face visit was counseling/coordination of care.    No orders of the defined types were placed  in this encounter.      No orders of the defined types were placed in this encounter.      There are no discontinued medications.      Encounter Diagnosis   Name Primary?     PVC's (premature ventricular contractions)        CURRENT MEDICATIONS:  Current Outpatient Prescriptions   Medication Sig Dispense Refill     albuterol (2.5 MG/3ML) 0.083% neb solution Take 1 vial (2.5 mg) by nebulization every 4 hours as needed for shortness of breath / dyspnea or wheezing 90 vial 3     ALBUTEROL 90 MCG/ACT IN AERS 1-2 puffs Q 4-6 hrs prn 1 1 year     CALCIUM + D OR 600mg bid       CENTRUM OR TABS 1 TABLET DAILY       diphenhydrAMINE (BENADRYL) 25 MG capsule Take 25 mg by mouth every 6 hours as needed.       DoxePIN (SINEQUAN) 10 MG capsule Can take 2 three times a day if neaded       EPI E-Z PEN REMEDIOS 1:1000  IJ 1 TIME ONLY 2 3     esomeprazole (NEXIUM) 40 MG CR capsule Take 1 capsule (40 mg) by mouth every morning (before breakfast) Take 30-60 minutes before a eating. 30 capsule 11     glucagon 1 MG kit Inject 1 mg into the muscle once for 1 dose 1 mg 0     ibuprofen (ADVIL,MOTRIN) 600 MG tablet Take 1 tablet by mouth every 6 hours as needed for other (inflammatory pain). 30 tablet      IRON RELEASE TBCR 160 MG OR 1 TABLET DAILY       metoprolol (TOPROL-XL) 100 MG 24 hr tablet Take 1 tablet (100 mg) by mouth daily 90 tablet 3     Mometasone Furo-Formoterol Fum (DULERA IN)        montelukast (SINGULAIR) 10 MG tablet Take 10 mg by mouth daily        olopatadine (PATANOL) 0.1 % ophthalmic solution Place 2 drops into both eyes daily as needed        Omalizumab (XOLAIR SC) Twice per month       ondansetron (ZOFRAN-ODT) 4 MG ODT tab Take 1 tablet (4 mg) by mouth every 6 hours as needed for nausea 20 tablet 11     RANITIDINE  MG OR TABS ONE TABLET TWICE DAILY 3 MONTHS 3     triamcinolone (KENALOG) 0.5 % cream Apply sparingly to affected area three times daily. 30 g 3     Vitamin D, Cholecalciferol, 1000 UNITS TABS Take by  mouth daily          ALLERGIES     Allergies   Allergen Reactions     Toradol Anaphylaxis     Chicken Allergy      hives     Dust Mites      runny, stuffy nose. Gets fungal sinus inf.     Food      FRESH VEGETABLES (COOKED IS OK EXCEPT ONIONS AND BASAL)     Fruit Extracts      Cannot eat raw fruits and vegetables but can eat them cooked     Lactose      intolerant     Latex Hives     Mold      same as dust     No Clinical Screening - See Comments Hives     BASIL     Onion Hives     Oxycodone Difficulty breathing     Relieved with benadryl 50mg     Sunflower Oil Hives     OR SEEDS     Trees      same as dust     Glucosamine Complex [Glucosamine] Palpitations     Aggravates frequent PVC       PAST MEDICAL HISTORY:  Past Medical History:   Diagnosis Date     Allergic rhinitis, cause unspecified      Allergy, food      Anemia, unspecified      Chronic idiopathic urticaria      Chronic maxillary sinusitis     recurrent fungal infection (remote)     Esophageal reflux      Irregular heart beat      Mild intermittent asthma      Murmur, heart     12/1/2015 3/6 systolic murmur heard - echo ordered     Personal history of other malignant neoplasm of skin     Basal cell     PONV (postoperative nausea and vomiting)      PVC (premature ventricular contraction) 12/2015 12/2015 Holter - SR (HR ) with PVC's and SVPB's       PAST SURGICAL HISTORY:  Past Surgical History:   Procedure Laterality Date     C NONSPECIFIC PROCEDURE  1998    hemorrhoidectomy     C NONSPECIFIC PROCEDURE  82    salivary gland resection rt side     C NONSPECIFIC PROCEDURE  1998    hemorrhoidectomy     C NONSPECIFIC PROCEDURE  2000    sinus surgery     HC ABLATION, ENDOMETRIAL, THERMAL, W/O HYSTEROSCOPIC GUIDANCE  7/10     HEPATECTOMY PARTIAL Left 2/16/2016    Procedure: HEPATECTOMY PARTIAL;  Surgeon: Kevin Howard MD;  Location: [facility]     HYSTERECTOMY, PAP NO LONGER INDICATED  8/2010     LAPAROSCOPIC HEPATECTOMY PARTIAL Left 2/16/2016    Procedure:  LAPAROSCOPIC HEPATECTOMY PARTIAL;  Surgeon: Kevin Howard MD;  Location: [facility] OR     LAPAROSCOPY DIAGNOSTIC (GENERAL) N/A 2/16/2016    Procedure: LAPAROSCOPY DIAGNOSTIC (GENERAL);  Surgeon: Kevin Howard MD;  Location: [facility] OR     RECONSTRUCT ABDOMINAL WALL  10/23/2012    Procedure: RECONSTRUCT ABDOMINAL WALL;  ABDOMINAL WALL RECONSTRUCTION WITH MESH ;  Surgeon: Romi Renteria MD;  Location: [facility] OR     SALPINGO OOPHORECTOMY,R/L/FAUSTINA      Salpingo Oophorectomy, RT/LT/FAUSTINA     SURGICAL HISTORY OF -       partial colectomy (3/4 colon)       FAMILY HISTORY:  Family History   Problem Relation Age of Onset     Cardiovascular Maternal Grandmother      CHF     DIABETES Maternal Grandmother      OSTEOPOROSIS Maternal Grandmother      DIABETES Mother      Hypertension Mother      Lipids Mother      Breast Cancer Mother      CANCER Mother 82     DX this year, 2015- breast cancer     Alzheimer Disease Mother      CEREBROVASCULAR DISEASE Paternal Grandmother      CANCER Father      Skin cancer - BCC     DIABETES Maternal Aunt      Cancer - colorectal Maternal Aunt      Thyroid Disease No family hx of      C.A.D. No family hx of        SOCIAL HISTORY:  Social History     Social History     Marital status:      Spouse name: Ritesh     Number of children: 2     Years of education: 14     Occupational History     Homemaker      Social History Main Topics     Smoking status: Never Smoker     Smokeless tobacco: Never Used     Alcohol use No      Comment: RARE     Drug use: No     Sexual activity: Yes     Partners: Male     Birth control/ protection: Pill, Surgical      Comment:  had vasectomy     Other Topics Concern     Parent/Sibling W/ Cabg, Mi Or Angioplasty Before 65f 55m? No     Caffeine Concern No     Sleep Concern No     Special Diet Yes     allergy related      Exercise No     some walking      Seat Belt Yes     Social History Narrative    .  Lives with .  2 sons, both with autism.  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        Review of Systems:  Skin:  Negative for       Eyes:  Positive for glasses    ENT:  Negative for      Respiratory:  Positive for cough ashma   Cardiovascular:    edema;Positive for    Gastroenterology: Positive for reflux;heartburn    Genitourinary:         Musculoskeletal:  Negative for      Neurologic:  Positive for numbness or tingling of hands    Psychiatric:  Negative for      Heme/Lymph/Imm:  Positive for allergies    Endocrine:  Negative        Physical Exam:  Vitals: /84  Pulse 80  Ht 1.651 m (5' 5\")  Wt 118.8 kg (262 lb)  LMP 06/04/2010  BMI 43.6 kg/m2    Constitutional:           Skin:             Head:           Eyes:           Lymph:      ENT:           Neck:           Respiratory:            Cardiac:                                                           GI:           Extremities and Muscular Skeletal:                 Neurological:           Psych:           CC  Mariana Zepeda, APRN CNP  [address]  [place] [id number]              "

## 2018-06-04 ENCOUNTER — TRANSFERRED RECORDS (OUTPATIENT)
Dept: HEALTH INFORMATION MANAGEMENT | Facility: CLINIC | Age: 58
End: 2018-06-04

## 2018-07-12 ENCOUNTER — TELEPHONE (OUTPATIENT)
Dept: PEDIATRICS | Facility: CLINIC | Age: 58
End: 2018-07-12

## 2018-07-12 DIAGNOSIS — Z12.31 ENCOUNTER FOR SCREENING MAMMOGRAM FOR BREAST CANCER: Primary | ICD-10-CM

## 2018-07-12 NOTE — LETTER
My Asthma Action Plan  Name: Stephanie Mendez   YOB: 1960  Date: 7/12/2018   My doctor: Nancy Urban MD   My clinic: [facility] [place]        My Control Medicine: Mometasone + formoterol (Dulera) -  200/5 mcg Inhaler   My Rescue Medicine: Albuterol (Proair/Ventolin/Proventil) inhaler 90mcg/act   My Asthma Severity: mild persistent  Avoid your asthma triggers: Pt aware of triggers               GREEN ZONE   Good Control    I feel good    No cough or wheeze    Can work, sleep and play without asthma symptoms       Take your asthma control medicine every day.     1. If exercise triggers your asthma, take your rescue medication    15 minutes before exercise or sports, and    During exercise if you have asthma symptoms  2. Spacer to use with inhaler: If you have a spacer, make sure to use it with your inhaler             YELLOW ZONE Getting Worse  I have ANY of these:    I do not feel good    Cough or wheeze    Chest feels tight    Wake up at night   1. Keep taking your Green Zone medications  2. Start taking your rescue medicine:    every 20 minutes for up to 1 hour. Then every 4 hours for 24-48 hours.  3. If you stay in the Yellow Zone for more than 12-24 hours, contact your doctor.  4. If you do not return to the Green Zone in 12-24 hours or you get worse, start taking your oral steroid medicine if prescribed by your provider.           RED ZONE Medical Alert - Get Help  I have ANY of these:    I feel awful    Medicine is not helping    Breathing getting harder    Trouble walking or talking    Nose opens wide to breathe       1. Take your rescue medicine NOW  2. If your provider has prescribed an oral steroid medicine, start taking it NOW  3. Call your doctor NOW  4. If you are still in the Red Zone after 20 minutes and you have not reached your doctor:    Take your rescue medicine again and    Call 911 or go to the emergency room right away    See your regular doctor within 2 weeks of an  Emergency Room or Urgent Care visit for follow-up treatment.          Annual Reminders:  Meet with Asthma Educator,  Flu Shot in the Fall, consider Pneumonia Vaccination for patients with asthma (aged 19 and older).    Pharmacy: [id number] - [place], [place] - [address] [facility]                      Asthma Triggers  How To Control Things That Make Your Asthma Worse    Triggers are things that make your asthma worse.  Look at the list below to help you find your triggers and what you can do about them.  You can help prevent asthma flare-ups by staying away from your triggers.      Trigger                                                          What you can do   Cigarette Smoke  Tobacco smoke can make asthma worse. Do not allow smoking in your home, car or around you.  Be sure no one smokes at a child s day care or school.  If you smoke, ask your health care provider for ways to help you quit.  Ask family members to quit too.  Ask your health care provider for a referral to Quit Plan to help you quit smoking, or call 8-824-956-PLAN.     Colds, Flu, Bronchitis  These are common triggers of asthma. Wash your hands often.  Don t touch your eyes, nose or mouth.  Get a flu shot every year.     Dust Mites  These are tiny bugs that live in cloth or carpet. They are too small to see. Wash sheets and blankets in hot water every week.   Encase pillows and mattress in dust mite proof covers.  Avoid having carpet if you can. If you have carpet, vacuum weekly.   Use a dust mask and HEPA vacuum.   Pollen and Outdoor Mold  Some people are allergic to trees, grass, or weed pollen, or molds. Try to keep your windows closed.  Limit time out doors when pollen count is high.   Ask you health care provider about taking medicine during allergy season.     Animal Dander  Some people are allergic to skin flakes, urine or saliva from pets with fur or feathers. Keep pets with fur or feathers out of your home.     If you can t keep the pet outdoors, then keep the pet out of your bedroom.  Keep the bedroom door closed.  Keep pets off cloth furniture and away from stuffed toys.     Mice, Rats, and Cockroaches  Some people are allergic to the waste from these pests.   Cover food and garbage.  Clean up spills and food crumbs.  Store grease in the refrigerator.   Keep food out of the bedroom.   Indoor Mold  This can be a trigger if your home has high moisture. Fix leaking faucets, pipes, or other sources of water.   Clean moldy surfaces.  Dehumidify basement if it is damp and smelly.   Smoke, Strong Odors, and Sprays  These can reduce air quality. Stay away from strong odors and sprays, such as perfume, powder, hair spray, paints, smoke incense, paint, cleaning products, candles and new carpet.   Exercise or Sports  Some people with asthma have this trigger. Be active!  Ask your doctor about taking medicine before sports or exercise to prevent symptoms.    Warm up for 5-10 minutes before and after sports or exercise.     Other Triggers of Asthma  Cold air:  Cover your nose and mouth with a scarf.  Sometimes laughing or crying can be a trigger.  Some medicines and food can trigger asthma.

## 2018-07-12 NOTE — TELEPHONE ENCOUNTER
Type of outreach:  Pt requesting mammogram, order placed and AAP updated  Health Maintenance Due   Topic Date Due     HIV SCREEN (SYSTEM ASSIGNED)  07/29/1976     ASTHMA ACTION PLAN Q1 YR  06/23/2018     MAMMO Q1 YR  07/08/2018     Ngozi Carrera MA

## 2018-07-19 ENCOUNTER — RADIANT APPOINTMENT (OUTPATIENT)
Dept: MAMMOGRAPHY | Facility: CLINIC | Age: 58
End: 2018-07-19
Attending: PEDIATRICS
Payer: COMMERCIAL

## 2018-07-19 ENCOUNTER — TELEPHONE (OUTPATIENT)
Dept: PEDIATRICS | Facility: CLINIC | Age: 58
End: 2018-07-19

## 2018-07-19 DIAGNOSIS — Z12.31 ENCOUNTER FOR SCREENING MAMMOGRAM FOR BREAST CANCER: ICD-10-CM

## 2018-07-19 PROCEDURE — 77063 BREAST TOMOSYNTHESIS BI: CPT | Mod: TC

## 2018-07-19 PROCEDURE — 77067 SCR MAMMO BI INCL CAD: CPT | Mod: TC

## 2018-07-19 NOTE — TELEPHONE ENCOUNTER
Reason for Call:  Form, our goal is to have forms completed with 72 hours, however, some forms may require a visit or additional information.    Type of letter, form or note:  medical    Who is the form from?: Patient    Where did the form come from: Patient or family brought in       What clinic location was the form placed at?: [facility]    Where the form was placed: 's Box    What number is listed as a contact on the form?: please complete form and call Stephanie when ready to  at [phone]       Additional comments: please complete form and call Stephanie when ready to  at [phone]    Call taken on 7/19/2018 at 12:27 PM by Sylvia Crawford

## 2018-09-26 ENCOUNTER — OFFICE VISIT (OUTPATIENT)
Dept: PEDIATRICS | Facility: CLINIC | Age: 58
End: 2018-09-26
Payer: COMMERCIAL

## 2018-09-26 VITALS
HEIGHT: 65 IN | DIASTOLIC BLOOD PRESSURE: 72 MMHG | SYSTOLIC BLOOD PRESSURE: 138 MMHG | TEMPERATURE: 97.4 F | HEART RATE: 98 BPM | BODY MASS INDEX: 42.32 KG/M2 | WEIGHT: 254 LBS | OXYGEN SATURATION: 97 %

## 2018-09-26 DIAGNOSIS — R22.32 ARM MASS, LEFT: ICD-10-CM

## 2018-09-26 DIAGNOSIS — M79.89 SOFT TISSUE MASS: Primary | ICD-10-CM

## 2018-09-26 LAB
BASOPHILS # BLD AUTO: 0 10E9/L (ref 0–0.2)
BASOPHILS NFR BLD AUTO: 0.4 %
DIFFERENTIAL METHOD BLD: NORMAL
EOSINOPHIL # BLD AUTO: 0.3 10E9/L (ref 0–0.7)
EOSINOPHIL NFR BLD AUTO: 4.5 %
ERYTHROCYTE [DISTWIDTH] IN BLOOD BY AUTOMATED COUNT: 13.6 % (ref 10–15)
HCT VFR BLD AUTO: 38.5 % (ref 35–47)
HGB BLD-MCNC: 12.3 G/DL (ref 11.7–15.7)
LYMPHOCYTES # BLD AUTO: 1.6 10E9/L (ref 0.8–5.3)
LYMPHOCYTES NFR BLD AUTO: 23.1 %
MCH RBC QN AUTO: 27.2 PG (ref 26.5–33)
MCHC RBC AUTO-ENTMCNC: 31.9 G/DL (ref 31.5–36.5)
MCV RBC AUTO: 85 FL (ref 78–100)
MONOCYTES # BLD AUTO: 0.4 10E9/L (ref 0–1.3)
MONOCYTES NFR BLD AUTO: 6.2 %
NEUTROPHILS # BLD AUTO: 4.7 10E9/L (ref 1.6–8.3)
NEUTROPHILS NFR BLD AUTO: 65.8 %
PLATELET # BLD AUTO: 250 10E9/L (ref 150–450)
RBC # BLD AUTO: 4.52 10E12/L (ref 3.8–5.2)
WBC # BLD AUTO: 7.1 10E9/L (ref 4–11)

## 2018-09-26 PROCEDURE — 99213 OFFICE O/P EST LOW 20 MIN: CPT | Performed by: PEDIATRICS

## 2018-09-26 PROCEDURE — 36415 COLL VENOUS BLD VENIPUNCTURE: CPT | Performed by: PEDIATRICS

## 2018-09-26 PROCEDURE — 85025 COMPLETE CBC W/AUTO DIFF WBC: CPT | Performed by: PEDIATRICS

## 2018-09-26 NOTE — MR AVS SNAPSHOT
After Visit Summary   2018    Stephanie Mendez    MRN: [id number]           Patient Information     Date Of Birth          1960        Visit Information        Provider Department      2018 11:20 AM Nancy Urban MD [facility] [place]        Today's Diagnoses     Soft tissue mass    -  1    Arm mass, left          Care Instructions    Schedule visit with radiology for soft tissue ultrasound - [facility] [phone]    Follow both spots and alert me with changes    Lab work today to look at blood counts today          Follow-ups after your visit        Future tests that were ordered for you today     Open Future Orders        Priority Expected Expires Ordered    US Extremity Non Vascular Right Routine  2019            Who to contact     If you have questions or need follow up information about today's clinic visit or your schedule please contact [facility] [place] directly at [phone].  Normal or non-critical lab and imaging results will be communicated to you by MyChart, letter or phone within 4 business days after the clinic has received the results. If you do not hear from us within 7 days, please contact the clinic through [employer] or phone. If you have a critical or abnormal lab result, we will notify you by phone as soon as possible.  Submit refill requests through [employer] or call your pharmacy and they will forward the refill request to us. Please allow 3 business days for your refill to be completed.          Additional Information About Your Visit        MyChart Information     [employer] gives you secure access to your electronic health record. If you see a primary care provider, you can also send messages to your care team and make appointments. If you have questions, please call your primary care clinic.  If you do not have a primary care provider, please call [phone] and they will assist you.        Care  "EveryWhere ID     This is your Care EveryWhere ID. This could be used by other organizations to access your [facility] medical records  [id number]        Your Vitals Were     Pulse Temperature Height Last Period Pulse Oximetry BMI (Body Mass Index)    98 97.4  F (36.3  C) (Tympanic) 5' 5\" (1.651 m) 06/04/2010 97% 42.27 kg/m2       Blood Pressure from Last 3 Encounters:   09/26/18 138/72   05/04/18 130/84   03/12/18 124/80    Weight from Last 3 Encounters:   09/26/18 254 lb (115.2 kg)   05/04/18 262 lb (118.8 kg)   03/12/18 255 lb (115.7 kg)              We Performed the Following     CBC with platelets differential        Primary Care Provider Office Phone # Fax #    Nancy Urban [phone] [address] [place] [address]        Equal Access to Services     [facility] AH: Hadii aad ku hadasho Soomaali, waaxda luqadaha, qaybta kaalmada adeegyada, waxay moodyin haysingh waters . So [facility] [phone].    ATENCIÓN: Si remala espcorina, tiene a stoddard disposición servicios gratuitos de asistencia lingüística. Llame al [phone].    We comply with applicable federal civil rights laws and [place] laws. We do not discriminate on the basis of race, color, national origin, age, disability, sex, sexual orientation, or gender identity.            Thank you!     Thank you for choosing [facility] [place]  for your care. Our goal is always to provide you with excellent care. Hearing back from our patients is one way we can continue to improve our services. Please take a few minutes to complete the written survey that you may receive in the mail after your visit with us. Thank you!             Your Updated Medication List - Protect others around you: Learn how to safely use, store and throw away your medicines at www.disposemymeds.org.          This list is accurate as of 9/26/18 11:54 AM.  Always use your most recent med list.                   Brand Name Dispense " Instructions for use Diagnosis    albuterol (2.5 MG/3ML) 0.083% neb solution     90 vial    Take 1 vial (2.5 mg) by nebulization every 4 hours as needed for shortness of breath / dyspnea or wheezing    Mild persistent asthma without complication       albuterol 90 MCG/ACT inhaler     1    1-2 puffs Q 4-6 hrs prn    Mild intermittent asthma       BENADRYL 25 MG capsule   Generic drug:  diphenhydrAMINE      Take 25 mg by mouth every 6 hours as needed.        CALCIUM + D PO      600mg bid        CENTRUM Tabs      1 TABLET DAILY        doxepin 10 MG capsule    SINEquan     Can take 2 three times a day if neaded    Angioedema       DULERA IN           EPI E-Z PEN REMEDIOS 1:1000  IJ     2    1 TIME ONLY    Allergy to other foods       esomeprazole 40 MG CR capsule    nexIUM    30 capsule    Take 1 capsule (40 mg) by mouth every morning (before breakfast) Take 30-60 minutes before a eating.    Gastroesophageal reflux disease, esophagitis presence not specified       ibuprofen 600 MG tablet    ADVIL/MOTRIN    30 tablet    Take 1 tablet by mouth every 6 hours as needed for other (inflammatory pain).    Ventral hernia       IRON RELEASE TBCR 160 MG OR      1 TABLET DAILY        ondansetron 4 MG ODT tab    ZOFRAN-ODT    20 tablet    Take 1 tablet (4 mg) by mouth every 6 hours as needed for nausea    Nausea       PATANOL 0.1 % ophthalmic solution   Generic drug:  olopatadine      Place 2 drops into both eyes daily as needed        ranitidine 150 MG tablet    ZANTAC    3 MONTHS    ONE TABLET TWICE DAILY        SINGULAIR 10 MG tablet   Generic drug:  montelukast      Take 10 mg by mouth daily        triamcinolone 0.5 % cream    KENALOG    30 g    Apply sparingly to affected area three times daily.    Contact dermatitis, unspecified contact dermatitis type, unspecified trigger       Vitamin D (Cholecalciferol) 1000 units Tabs      Take by mouth daily        XOLAIR SC      Twice per month

## 2018-09-26 NOTE — PROGRESS NOTES
"  SUBJECTIVE:   Stephanie Mendez is a 58 year old female who presents to clinic today for the following health issues:      Lumps       Duration: present for awhile     Description (location/character/radiation): lumps on both arms     Intensity:  moderate    Accompanying signs and symptoms: small lumps found in arms, only pain noticed when pressure is applied to left elbow lump     History (similar episodes/previous evaluation): None    Precipitating or alleviating factors: None    Therapies tried and outcome: None    Right arm mass - near axilla.  Superficial and seems to change size.  No overlying skin changes.  Hasn't noticed any large lymph nodes in armpits.  No associated pain.     Left arm mass - near elbow - present for the last year without change, not tender.   No fevers, night sweats.  No unintentional weight loss.  No arm or hand infections.    Asthma has been under control - ACT today of 20.    Already received flu shot.      Problem list and histories reviewed & adjusted, as indicated.  Additional history: as documented      Reviewed and updated as needed this visit by clinical staff  Tobacco  Allergies  Meds  Med Hx  Surg Hx  Fam Hx  Soc Hx      Reviewed and updated as needed this visit by Provider         ROS:  Constitutional, skin, lymph systems are negative, except as otherwise noted.    OBJECTIVE:     /72 (BP Location: Right arm, Patient Position: Right side, Cuff Size: Adult Large)  Pulse 98  Temp 97.4  F (36.3  C) (Tympanic)  Ht 5' 5\" (1.651 m)  Wt 254 lb (115.2 kg)  LMP 06/04/2010  SpO2 97%  BMI 42.27 kg/m2  Body mass index is 42.27 kg/(m^2).  GENERAL: healthy, alert and no distress  SKIN: right arm with 5cm diameter elliptical soft mass just distal to the right axilla without overlying skin changes, redness, warmth, or tenderness.  Freely mobile and soft.   Left arm with 5mm diameter nodule medial to left elbow without overlying skin change, redness, or warmth.  No cuts or " wounds on left arm.     LYMPH: No axillary lymphadenopathy  PSYCH: mentation appears normal, affect normal/bright    Diagnostic Test Results:  pending    ASSESSMENT/PLAN:         ICD-10-CM    1. Soft tissue mass M79.9 US Extremity Non Vascular Right     CBC with platelets differential   2. Arm mass, left R22.32 US Extremity Non Vascular Right     CBC with platelets differential       Plan to investigate with blood counts today and US imaging of right arm soft tissue mass.  Plan to observe left arm nodule - ? Epitrochlear node.  Patient to alert me with any changes in either of these masses that have been present over the last several months to one year.    Patient Instructions   Schedule visit with radiology for soft tissue ultrasound - [facility] [phone]    Follow both spots and alert me with changes    Lab work today to look at blood counts today      Nancy Urban MD  [facility]

## 2018-09-26 NOTE — PATIENT INSTRUCTIONS
Schedule visit with radiology for soft tissue ultrasound - [facility] [phone]    Follow both spots and alert me with changes    Lab work today to look at blood counts today

## 2018-09-27 ASSESSMENT — ASTHMA QUESTIONNAIRES: ACT_TOTALSCORE: 20

## 2018-10-23 ENCOUNTER — HOSPITAL ENCOUNTER (OUTPATIENT)
Dept: ULTRASOUND IMAGING | Facility: CLINIC | Age: 58
Discharge: HOME | End: 2018-10-23
Attending: PEDIATRICS | Admitting: PEDIATRICS
Payer: COMMERCIAL

## 2018-10-23 DIAGNOSIS — R22.32 ARM MASS, LEFT: ICD-10-CM

## 2018-10-23 DIAGNOSIS — M79.89 SOFT TISSUE MASS: ICD-10-CM

## 2018-10-23 PROCEDURE — 76882 US LMTD JT/FCL EVL NVASC XTR: CPT | Mod: RT

## 2018-11-27 ENCOUNTER — TRANSFERRED RECORDS (OUTPATIENT)
Dept: HEALTH INFORMATION MANAGEMENT | Facility: CLINIC | Age: 58
End: 2018-11-27

## 2018-12-10 ENCOUNTER — TRANSFERRED RECORDS (OUTPATIENT)
Dept: HEALTH INFORMATION MANAGEMENT | Facility: CLINIC | Age: 58
End: 2018-12-10

## 2018-12-17 ENCOUNTER — TRANSFERRED RECORDS (OUTPATIENT)
Dept: HEALTH INFORMATION MANAGEMENT | Facility: CLINIC | Age: 58
End: 2018-12-17

## 2019-03-14 ENCOUNTER — OFFICE VISIT (OUTPATIENT)
Dept: PEDIATRICS | Facility: CLINIC | Age: 59
End: 2019-03-14
Payer: COMMERCIAL

## 2019-03-14 VITALS
DIASTOLIC BLOOD PRESSURE: 82 MMHG | TEMPERATURE: 98.3 F | WEIGHT: 261 LBS | OXYGEN SATURATION: 96 % | HEIGHT: 66 IN | SYSTOLIC BLOOD PRESSURE: 120 MMHG | HEART RATE: 83 BPM | BODY MASS INDEX: 41.95 KG/M2

## 2019-03-14 DIAGNOSIS — J45.30 MILD PERSISTENT ASTHMA WITHOUT COMPLICATION: ICD-10-CM

## 2019-03-14 DIAGNOSIS — Z12.11 SCREEN FOR COLON CANCER: ICD-10-CM

## 2019-03-14 DIAGNOSIS — L25.9 CONTACT DERMATITIS, UNSPECIFIED CONTACT DERMATITIS TYPE, UNSPECIFIED TRIGGER: ICD-10-CM

## 2019-03-14 DIAGNOSIS — L50.9 URTICARIA: ICD-10-CM

## 2019-03-14 DIAGNOSIS — K21.9 GASTROESOPHAGEAL REFLUX DISEASE, ESOPHAGITIS PRESENCE NOT SPECIFIED: ICD-10-CM

## 2019-03-14 DIAGNOSIS — Z00.00 ROUTINE GENERAL MEDICAL EXAMINATION AT A HEALTH CARE FACILITY: Primary | ICD-10-CM

## 2019-03-14 DIAGNOSIS — Z91.018 ALLERGY, FOOD: ICD-10-CM

## 2019-03-14 DIAGNOSIS — M70.41 PREPATELLAR BURSITIS OF RIGHT KNEE: ICD-10-CM

## 2019-03-14 DIAGNOSIS — R11.0 NAUSEA: ICD-10-CM

## 2019-03-14 DIAGNOSIS — N76.0 BACTERIAL VAGINOSIS: ICD-10-CM

## 2019-03-14 DIAGNOSIS — E66.01 MORBID OBESITY, UNSPECIFIED OBESITY TYPE (H): ICD-10-CM

## 2019-03-14 DIAGNOSIS — T78.2XXS ANAPHYLAXIS, SEQUELA: ICD-10-CM

## 2019-03-14 DIAGNOSIS — T78.3XXS ANGIOEDEMA, SEQUELA: ICD-10-CM

## 2019-03-14 DIAGNOSIS — B96.89 BACTERIAL VAGINOSIS: ICD-10-CM

## 2019-03-14 LAB
ALBUMIN SERPL-MCNC: 3.5 G/DL (ref 3.4–5)
ALP SERPL-CCNC: 80 U/L (ref 40–150)
ALT SERPL W P-5'-P-CCNC: 24 U/L (ref 0–50)
ANION GAP SERPL CALCULATED.3IONS-SCNC: 9 MMOL/L (ref 3–14)
AST SERPL W P-5'-P-CCNC: 20 U/L (ref 0–45)
BILIRUB SERPL-MCNC: 0.5 MG/DL (ref 0.2–1.3)
BUN SERPL-MCNC: 13 MG/DL (ref 7–30)
CALCIUM SERPL-MCNC: 9.6 MG/DL (ref 8.5–10.1)
CHLORIDE SERPL-SCNC: 109 MMOL/L (ref 94–109)
CHOLEST SERPL-MCNC: 190 MG/DL
CO2 SERPL-SCNC: 24 MMOL/L (ref 20–32)
CREAT SERPL-MCNC: 0.74 MG/DL (ref 0.52–1.04)
ERYTHROCYTE [DISTWIDTH] IN BLOOD BY AUTOMATED COUNT: 13.7 % (ref 10–15)
GFR SERPL CREATININE-BSD FRML MDRD: 88 ML/MIN/{1.73_M2}
GLUCOSE SERPL-MCNC: 103 MG/DL (ref 70–99)
HBA1C MFR BLD: 5.6 % (ref 0–5.6)
HCT VFR BLD AUTO: 39.4 % (ref 35–47)
HDLC SERPL-MCNC: 36 MG/DL
HGB BLD-MCNC: 12.8 G/DL (ref 11.7–15.7)
LDLC SERPL CALC-MCNC: 127 MG/DL
MCH RBC QN AUTO: 27.5 PG (ref 26.5–33)
MCHC RBC AUTO-ENTMCNC: 32.5 G/DL (ref 31.5–36.5)
MCV RBC AUTO: 85 FL (ref 78–100)
NONHDLC SERPL-MCNC: 154 MG/DL
PLATELET # BLD AUTO: 273 10E9/L (ref 150–450)
POTASSIUM SERPL-SCNC: 3.9 MMOL/L (ref 3.4–5.3)
PROT SERPL-MCNC: 7.2 G/DL (ref 6.8–8.8)
RBC # BLD AUTO: 4.66 10E12/L (ref 3.8–5.2)
SODIUM SERPL-SCNC: 142 MMOL/L (ref 133–144)
TRIGL SERPL-MCNC: 137 MG/DL
TSH SERPL DL<=0.005 MIU/L-ACNC: 3.32 MU/L (ref 0.4–4)
WBC # BLD AUTO: 7.7 10E9/L (ref 4–11)

## 2019-03-14 PROCEDURE — 84443 ASSAY THYROID STIM HORMONE: CPT | Performed by: PEDIATRICS

## 2019-03-14 PROCEDURE — 83036 HEMOGLOBIN GLYCOSYLATED A1C: CPT | Performed by: PEDIATRICS

## 2019-03-14 PROCEDURE — 85027 COMPLETE CBC AUTOMATED: CPT | Performed by: PEDIATRICS

## 2019-03-14 PROCEDURE — 80061 LIPID PANEL: CPT | Performed by: PEDIATRICS

## 2019-03-14 PROCEDURE — 36415 COLL VENOUS BLD VENIPUNCTURE: CPT | Performed by: PEDIATRICS

## 2019-03-14 PROCEDURE — 80053 COMPREHEN METABOLIC PANEL: CPT | Performed by: PEDIATRICS

## 2019-03-14 PROCEDURE — 99396 PREV VISIT EST AGE 40-64: CPT | Performed by: PEDIATRICS

## 2019-03-14 RX ORDER — ONDANSETRON 4 MG/1
4 TABLET, ORALLY DISINTEGRATING ORAL EVERY 6 HOURS PRN
Qty: 20 TABLET | Refills: 11 | Status: SHIPPED | OUTPATIENT
Start: 2019-03-14 | End: 2020-03-17

## 2019-03-14 RX ORDER — ESOMEPRAZOLE MAGNESIUM 40 MG/1
40 CAPSULE, DELAYED RELEASE ORAL
Qty: 30 CAPSULE | Refills: 11 | Status: SHIPPED | OUTPATIENT
Start: 2019-03-14 | End: 2020-03-17

## 2019-03-14 RX ORDER — METRONIDAZOLE 7.5 MG/G
1 GEL VAGINAL DAILY
Qty: 25 G | Refills: 3 | Status: SHIPPED | OUTPATIENT
Start: 2019-03-14 | End: 2019-03-19

## 2019-03-14 RX ORDER — ALBUTEROL SULFATE 0.83 MG/ML
2.5 SOLUTION RESPIRATORY (INHALATION) EVERY 4 HOURS PRN
Qty: 90 VIAL | Refills: 3 | Status: SHIPPED | OUTPATIENT
Start: 2019-03-14 | End: 2023-01-18

## 2019-03-14 RX ORDER — TRIAMCINOLONE ACETONIDE 5 MG/G
CREAM TOPICAL
Qty: 30 G | Refills: 3 | Status: SHIPPED | OUTPATIENT
Start: 2019-03-14 | End: 2020-07-02

## 2019-03-14 ASSESSMENT — ENCOUNTER SYMPTOMS
SHORTNESS OF BREATH: 0
JOINT SWELLING: 0
HEARTBURN: 0
ABDOMINAL PAIN: 0
DYSURIA: 0
NERVOUS/ANXIOUS: 0
DIARRHEA: 0
EYE PAIN: 0
NAUSEA: 0
PALPITATIONS: 0
HEMATURIA: 0
SORE THROAT: 0
MYALGIAS: 0
BREAST MASS: 0
COUGH: 1
DIZZINESS: 0
WEAKNESS: 0
PARESTHESIAS: 0
FEVER: 0
HEMATOCHEZIA: 0
CHILLS: 0
CONSTIPATION: 0
FREQUENCY: 0

## 2019-03-14 ASSESSMENT — MIFFLIN-ST. JEOR: SCORE: 1762.7

## 2019-03-14 NOTE — PROGRESS NOTES
SUBJECTIVE:   CC: Stephanie Mendez is an 58 year old woman who presents for preventive health visit.     Physical   Annual:     Getting at least 3 servings of Calcium per day:  Yes    Bi-annual eye exam:  Yes    Dental care twice a year:  Yes    Sleep apnea or symptoms of sleep apnea:  None    Diet:  Other    Frequency of exercise:  4-5 days/week    Duration of exercise:  30-45 minutes    Taking medications regularly:  No    Barriers to taking medications:  None    Additional concerns today:  Yes (form, right knee - bump )    PHQ-2 Total Score: 0        Today's PHQ-2 Score:   PHQ-2 ( 1999 [employer]) 3/14/2019   Q1: Little interest or pleasure in doing things 0   Q2: Feeling down, depressed or hopeless 0   PHQ-2 Score 0   Q1: Little interest or pleasure in doing things Not at all   Q2: Feeling down, depressed or hopeless Not at all   PHQ-2 Score 0       Abuse: Current or Past(Physical, Sexual or Emotional)- No  Do you feel safe in your environment? Yes    Social History     Tobacco Use     Smoking status: Never Smoker     Smokeless tobacco: Never Used   Substance Use Topics     Alcohol use: No     Alcohol/week: 0.0 oz     Comment: RARE     Alcohol Use 3/14/2019   If you drink alcohol do you typically have greater than 3 drinks per day OR greater than 7 drinks per week? No   No flowsheet data found.    Reviewed orders with patient.  Reviewed health maintenance and updated orders accordingly - Yes    Mammogram Screening: Patient over age 50, mutual decision to screen reflected in health maintenance.    Pertinent mammograms are reviewed under the imaging tab.  History of abnormal Pap smear: Status post benign hysterectomy. Health Maintenance and Surgical History updated.  PAP / HPV 12/18/2008 8/7/2006   PAP Acellular, No Test (Use with Unsat vials w/o brush) NIL     Reviewed and updated as needed this visit by clinical staff  Tobacco  Allergies  Meds         Reviewed and updated as needed this visit by Provider       "      Following with Dr Mercado for angioedema, urticaria, asthma - takes prednisone when traveling due to reactions from environmental exposures.    No more PVCs after stopping glucosamine/chondroitin    Reflux - controlled on medication - if misses a dose the next day, she experiences symptoms      Last flex sig about 10 years ago.   Has minimal colon left after past surgeries - gets flex sigs    Right knee - bump present - only hurts when kneeling    Recurrent BV, wondering about having a script present for refills when needed.    Nausea - intermittent - likes to have zofran on hand        Review of Systems   Constitutional: Negative for chills and fever.   HENT: Negative for congestion, ear pain, hearing loss and sore throat.    Eyes: Negative for pain and visual disturbance.   Respiratory: Positive for cough. Negative for shortness of breath.    Cardiovascular: Positive for peripheral edema. Negative for chest pain and palpitations.   Gastrointestinal: Negative for abdominal pain, constipation, diarrhea, heartburn, hematochezia and nausea.   Breasts:  Negative for tenderness, breast mass and discharge.   Genitourinary: Negative for dysuria, frequency, genital sores, hematuria, pelvic pain, urgency, vaginal bleeding and vaginal discharge.   Musculoskeletal: Negative for joint swelling and myalgias.   Skin: Negative for rash.   Neurological: Negative for dizziness, weakness and paresthesias.   Psychiatric/Behavioral: Negative for mood changes. The patient is not nervous/anxious.         OBJECTIVE:   /82 (BP Location: Right arm, Patient Position: Right side, Cuff Size: Adult Large)   Pulse 83   Temp 98.3  F (36.8  C) (Tympanic)   Ht 1.664 m (5' 5.5\")   Wt 118.4 kg (261 lb)   LMP 06/04/2010   SpO2 96%   BMI 42.77 kg/m    Physical Exam  GENERAL: healthy, alert and no distress  EYES: Eyes grossly normal to inspection, PERRL and conjunctivae and sclerae normal  HENT: ear canals and TM's normal, nose and " mouth without ulcers or lesions  NECK: no adenopathy, no asymmetry, masses, or scars and thyroid normal to palpation  RESP: lungs clear to auscultation - no rales, rhonchi or wheezes  BREAST: normal without masses, tenderness or nipple discharge and no palpable axillary masses or adenopathy  CV: regular rate and rhythm, normal S1 S2, no S3 or S4, no murmur, click or rub, no peripheral edema and peripheral pulses strong  ABDOMEN: soft, nontender, no hepatosplenomegaly, no masses and bowel sounds normal  MS: tenderness over prepatellar bursa, normal knee ROM, no joint deformities  SKIN: no suspicious lesions or rashes  NEURO: Normal strength and tone, mentation intact and speech normal  PSYCH: mentation appears normal, affect normal/bright    Diagnostic Test Results:  pending    ASSESSMENT/PLAN:       ICD-10-CM    1. Routine general medical examination at a health care facility Z00.00 Comprehensive metabolic panel     Lipid panel reflex to direct LDL Fasting     Hemoglobin A1c     CBC with platelets     TSH with free T4 reflex   2. Mild persistent asthma without complication J45.30 albuterol (PROVENTIL) (2.5 MG/3ML) 0.083% neb solution  Well controlled, continue current medications  Followed by Dr Mercado   3. Morbid obesity, unspecified obesity type (H) E66.01 Comprehensive metabolic panel     Lipid panel reflex to direct LDL Fasting     Hemoglobin A1c     CBC with platelets     TSH with free T4 reflex    Continued to encourage exercise, healthy eating   4. Gastroesophageal reflux disease, esophagitis presence not specified K21.9 esomeprazole (NEXIUM) 40 MG DR capsule  Well controlled, continue current medications     5. Angioedema, sequela T78.3XXS Followed closely by Dr Mercado, complicated allergic constellation of conditions   6. Allergy, food Z91.018    7. Anaphylaxis, sequela T78.2XXS    8. Urticaria L50.9    9. Screen for colon cancer Z12.11 Patient will get flex sig this year - very little colon left after  "subtotal resection   10. Nausea R11.0 ondansetron (ZOFRAN-ODT) 4 MG ODT tab  For prn use   11. Contact dermatitis, unspecified contact dermatitis type, unspecified trigger L25.9 triamcinolone (ARISTOCORT HP) 0.5 % external cream   12. Bacterial vaginosis N76.0 metroNIDAZOLE (METROGEL) 0.75 % vaginal gel    Gets recurrently - agreeable to treat on own and to come into clinic if not improving with cream    B96.89    13. Prepatellar bursitis of right knee M70.41 Discussed conservative measures, avoiding kneeling - to alert me if worsening.       COUNSELING:  Special attention given to:        Regular exercise       Healthy diet/nutrition       Vision screening    BP Readings from Last 1 Encounters:   03/14/19 120/82     Estimated body mass index is 42.77 kg/m  as calculated from the following:    Height as of this encounter: 1.664 m (5' 5.5\").    Weight as of this encounter: 118.4 kg (261 lb).           reports that  has never smoked. she has never used smokeless tobacco.      Counseling Resources:  ATP IV Guidelines  Pooled Cohorts Equation Calculator  Breast Cancer Risk Calculator  FRAX Risk Assessment  ICSI Preventive Guidelines  Dietary Guidelines for Americans, 2010  USDA's MyPlate  ASA Prophylaxis  Lung CA Screening    Nancy Urban MD  [facility] [place]  "

## 2019-03-14 NOTE — PATIENT INSTRUCTIONS
Think about Shingrix    Labs today    Refills sent to pharmacy    Flex sig in September - think about colorectal surgeons - Dr Emilie Mejia or Dr Leena Rene      Preventive Health Recommendations  Female Ages 50 - 64    Yearly exam: See your health care provider every year in order to  o Review health changes.   o Discuss preventive care.    o Review your medicines if your doctor has prescribed any.      Get a Pap test every three years (unless you have an abnormal result and your provider advises testing more often).    If you get Pap tests with HPV test, you only need to test every 5 years, unless you have an abnormal result.     You do not need a Pap test if your uterus was removed (hysterectomy) and you have not had cancer.    You should be tested each year for STDs (sexually transmitted diseases) if you're at risk.     Have a mammogram every 1 to 2 years.    Have a colonoscopy at age 50, or have a yearly FIT test (stool test). These exams screen for colon cancer.      Have a cholesterol test every 5 years, or more often if advised.    Have a diabetes test (fasting glucose) every three years. If you are at risk for diabetes, you should have this test more often.     If you are at risk for osteoporosis (brittle bone disease), think about having a bone density scan (DEXA).    Shots: Get a flu shot each year. Get a tetanus shot every 10 years.    Nutrition:     Eat at least 5 servings of fruits and vegetables each day.    Eat whole-grain bread, whole-wheat pasta and brown rice instead of white grains and rice.    Get adequate Calcium and Vitamin D.     Lifestyle    Exercise at least 150 minutes a week (30 minutes a day, 5 days a week). This will help you control your weight and prevent disease.    Limit alcohol to one drink per day.    No smoking.     Wear sunscreen to prevent skin cancer.     See your dentist every six months for an exam and cleaning.    See your eye doctor every 1 to 2 years.

## 2019-03-15 ASSESSMENT — ASTHMA QUESTIONNAIRES: ACT_TOTALSCORE: 22

## 2019-06-10 ENCOUNTER — TELEPHONE (OUTPATIENT)
Dept: PEDIATRICS | Facility: CLINIC | Age: 59
End: 2019-06-10

## 2019-06-10 NOTE — TELEPHONE ENCOUNTER
Prior Authorization Retail Medication Request    Medication/Dose:   ICD code (if different than what is on RX):  K21.9  Previously Tried and Failed:  Ranitidine HCL (150 mg), Omeprazole (20 mg AND 40 mg)  Rationale:  Patient has been treating her GERD with Nexium for over over a year. Past medications did not relieve symptoms. GERD is stable.     Insurance Name:  [employer]  Insurance ID:  [id number]       Pharmacy Information (if different than what is on RX)  Name:  [facility] [address] [facility]   Phone: [phone]

## 2019-06-12 NOTE — TELEPHONE ENCOUNTER
Central Prior Authorization Team   Phone: [phone]      PA Initiation    Medication: esomeprazole-Initiated  Insurance Company: [employer] - Phone [phone] Fax [phone]  Pharmacy Filling the Rx: [id number] - [place], [place] - [address] AT [facility]  Filling Pharmacy Phone: [phone]  Filling Pharmacy Fax:    Start Date: 6/12/2019

## 2019-06-13 NOTE — TELEPHONE ENCOUNTER
Prior Authorization Approval    Authorization Effective Date: 6/12/2019  Authorization Expiration Date: 6/12/2020  Medication: esomeprazole-APPROVED  Approved Dose/Quantity:   Reference #:     Insurance Company: [employer] - Phone [phone] Fax [phone]  Expected CoPay:       CoPay Card Available:      Foundation Assistance Needed:    Which Pharmacy is filling the prescription (Not needed for infusion/clinic administered): [id number] - [place], [place] - [address] AT [facility]  Pharmacy Notified: Yes  Patient Notified: No    Pharmacy will notify patient when medication is ready.

## 2019-07-05 ENCOUNTER — MYC MEDICAL ADVICE (OUTPATIENT)
Dept: PEDIATRICS | Facility: CLINIC | Age: 59
End: 2019-07-05

## 2019-07-05 DIAGNOSIS — Z12.31 VISIT FOR SCREENING MAMMOGRAM: Primary | ICD-10-CM

## 2019-07-19 ENCOUNTER — ANCILLARY PROCEDURE (OUTPATIENT)
Dept: MAMMOGRAPHY | Facility: CLINIC | Age: 59
End: 2019-07-19
Attending: PEDIATRICS
Payer: COMMERCIAL

## 2019-07-19 DIAGNOSIS — Z12.31 VISIT FOR SCREENING MAMMOGRAM: ICD-10-CM

## 2019-07-19 PROCEDURE — 77067 SCR MAMMO BI INCL CAD: CPT | Mod: TC

## 2019-07-19 PROCEDURE — 77063 BREAST TOMOSYNTHESIS BI: CPT | Mod: TC

## 2019-09-29 ENCOUNTER — HEALTH MAINTENANCE LETTER (OUTPATIENT)
Age: 59
End: 2019-09-29

## 2019-10-25 ENCOUNTER — TRANSFERRED RECORDS (OUTPATIENT)
Dept: HEALTH INFORMATION MANAGEMENT | Facility: CLINIC | Age: 59
End: 2019-10-25

## 2019-11-26 ENCOUNTER — TRANSFERRED RECORDS (OUTPATIENT)
Dept: HEALTH INFORMATION MANAGEMENT | Facility: CLINIC | Age: 59
End: 2019-11-26

## 2019-12-09 ENCOUNTER — TRANSFERRED RECORDS (OUTPATIENT)
Dept: HEALTH INFORMATION MANAGEMENT | Facility: CLINIC | Age: 59
End: 2019-12-09

## 2020-03-16 ENCOUNTER — HOSPITAL ENCOUNTER (OUTPATIENT)
Facility: CLINIC | Age: 60
End: 2020-03-16
Attending: COLON & RECTAL SURGERY | Admitting: COLON & RECTAL SURGERY

## 2020-03-17 ENCOUNTER — VIRTUAL VISIT (OUTPATIENT)
Dept: PEDIATRICS | Facility: CLINIC | Age: 60
End: 2020-03-17
Payer: COMMERCIAL

## 2020-03-17 DIAGNOSIS — N62 LARGE BREASTS: ICD-10-CM

## 2020-03-17 DIAGNOSIS — M54.9 CHRONIC UPPER BACK PAIN: Primary | ICD-10-CM

## 2020-03-17 DIAGNOSIS — K21.9 GASTROESOPHAGEAL REFLUX DISEASE, ESOPHAGITIS PRESENCE NOT SPECIFIED: ICD-10-CM

## 2020-03-17 DIAGNOSIS — G89.29 CHRONIC UPPER BACK PAIN: Primary | ICD-10-CM

## 2020-03-17 DIAGNOSIS — R11.0 NAUSEA: ICD-10-CM

## 2020-03-17 DIAGNOSIS — B96.89 BACTERIAL VAGINOSIS: ICD-10-CM

## 2020-03-17 DIAGNOSIS — N76.0 BACTERIAL VAGINOSIS: ICD-10-CM

## 2020-03-17 DIAGNOSIS — M25.512 BILATERAL SHOULDER PAIN, UNSPECIFIED CHRONICITY: ICD-10-CM

## 2020-03-17 DIAGNOSIS — M25.511 BILATERAL SHOULDER PAIN, UNSPECIFIED CHRONICITY: ICD-10-CM

## 2020-03-17 PROCEDURE — 99442 ZZC PHYSICIAN TELEPHONE EVALUATION 11-20 MIN: CPT | Performed by: PEDIATRICS

## 2020-03-17 RX ORDER — ESOMEPRAZOLE MAGNESIUM 40 MG/1
40 CAPSULE, DELAYED RELEASE ORAL
Qty: 30 CAPSULE | Refills: 11 | Status: SHIPPED | OUTPATIENT
Start: 2020-03-17 | End: 2021-11-03

## 2020-03-17 RX ORDER — ONDANSETRON 4 MG/1
4 TABLET, ORALLY DISINTEGRATING ORAL EVERY 6 HOURS PRN
Qty: 20 TABLET | Refills: 11 | Status: SHIPPED | OUTPATIENT
Start: 2020-03-17 | End: 2021-11-03

## 2020-03-17 RX ORDER — METRONIDAZOLE 7.5 MG/G
1 GEL VAGINAL DAILY
Qty: 35 G | Refills: 11 | Status: SHIPPED | OUTPATIENT
Start: 2020-03-17 | End: 2020-07-02

## 2020-03-17 NOTE — PROGRESS NOTES
"Stephanie Mendez is a 59 year old female who is being evaluated via a billable telephone visit.      The patient has been notified of following:     \"This telephone visit will be conducted via a call between you and your physician/provider. We have found that certain health care needs can be provided without the need for a physical exam.  This service lets us provide the care you need with a short phone conversation.  If a prescription is necessary we can send it directly to your pharmacy.  If lab work is needed we can place an order for that and you can then stop by our lab to have the test done at a later time.    If during the course of the call the physician/provider feels a telephone visit is not appropriate, you will not be charged for this service.\"       Stephanie Mendez complains of  No chief complaint on file.      I have reviewed and updated the patient's Past Medical History, Social History, Family History and Medication List.    ALLERGIES  Toradol; Chicken allergy; Dust mites; Food; Fruit extracts; Lactose; Latex; Mold; No clinical screening - see comments; Onion; Oxycodone; Sunflower oil; Trees; and Glucosamine complex [glucosamine]    Jaylin Cerda CMA   (MA signature)      Patient following up multiple visits today in lieu of an office visit due to the [place] 19 outbreak.    Patient's reflux has been well controlled on her current dose of Nexium.    Previous history of PVCs that were found to be related to 1 of her supplements.  These completely resolved with discontinuing her formulation of glucosamine/chondroitin and she has been completely asymptomatic since that time.    Patient with severe allergies and asthma under the care of Dr. Mercado.  Spring is a terrible time of year for her with regard to allergies and she has been staying inside.  Her chronic treatment regimen has not changed.  She did get her flu shot in October 2019.    Chronic nausea that is intermittent, unchanged.  Patient uses " ondansetron as needed.    Intermittent BV: This is been less of an issue recently, but patient prefers to have MetroGel on hand as this become a recurrent problem.  If her symptoms do not improve with MetroGel, she comes in for evaluation.      Back pain - upper back and shoulder pain have been chronic and are related to very large breast size.  Patient has previously tried physical therapy for this without improvement.  She has large grooves in her shoulders from her undergarments.  She is contemplating seeing plastic surgery to discuss breast reduction.      Increased Stress: Chronic increase stressors caring for 2 adult sons with autism who live with her.  She also recently had to put her mother on hospice care and cannot see her due to the co-pays 19 outbreak.      Assessment/Plan:    ICD-10-CM    1. Chronic upper back pain  M54.9 PLASTIC SURGERY REFERRAL    Plan for plastic surgery evaluation for breast reduction surgery.    G89.29    2. Gastroesophageal reflux disease, esophagitis presence not specified  K21.9 esomeprazole (NEXIUM) 40 MG DR capsule    Well-controlled, continue current medications   3. Nausea  R11.0 ondansetron (ZOFRAN-ODT) 4 MG ODT tab    Well-controlled, continue current medications   4. Bacterial vaginosis  N76.0 metroNIDAZOLE (METROGEL) 0.75 % vaginal gel    Continue intermittent treatment as needed.    B96.89    5. Large breasts  N62 PLASTIC SURGERY REFERRAL   6. Bilateral shoulder pain, unspecified chronicity  M25.511 PLASTIC SURGERY REFERRAL    M25.512          I have reviewed the note as documented above.  This accurately captures the substance of my conversation with the patient.      Phone call contact time  Call Started at 2:58  Call Ended at 3:10    Nancy Urban MD      Answers for HPI/ROS submitted by the patient on 3/17/2020   Chronic problems general questions HPI Form  How many servings of fruits and vegetables do you eat daily?: 4 or more  On average, how many sweetened  beverages do you drink each day (Examples: soda, juice, sweet tea, etc.  Do NOT count diet or artificially sweetened beverages)?: 0

## 2020-07-01 ASSESSMENT — ENCOUNTER SYMPTOMS
HEMATURIA: 0
ABDOMINAL PAIN: 0
SORE THROAT: 0
EYE PAIN: 0
HEMATOCHEZIA: 0
CONSTIPATION: 0
FEVER: 0
FREQUENCY: 0
JOINT SWELLING: 0
DIZZINESS: 0
DIARRHEA: 1
SHORTNESS OF BREATH: 0
WEAKNESS: 0
NERVOUS/ANXIOUS: 0
HEADACHES: 0
PALPITATIONS: 0
ARTHRALGIAS: 0
MYALGIAS: 0
NAUSEA: 0
PARESTHESIAS: 0
BREAST MASS: 0
COUGH: 0
CHILLS: 0
DYSURIA: 0
HEARTBURN: 0

## 2020-07-02 ENCOUNTER — OFFICE VISIT (OUTPATIENT)
Dept: PEDIATRICS | Facility: CLINIC | Age: 60
End: 2020-07-02
Payer: COMMERCIAL

## 2020-07-02 VITALS
HEART RATE: 93 BPM | BODY MASS INDEX: 43.37 KG/M2 | DIASTOLIC BLOOD PRESSURE: 80 MMHG | SYSTOLIC BLOOD PRESSURE: 138 MMHG | OXYGEN SATURATION: 96 % | WEIGHT: 260.3 LBS | TEMPERATURE: 98.2 F | HEIGHT: 65 IN | RESPIRATION RATE: 18 BRPM

## 2020-07-02 DIAGNOSIS — N76.0 BACTERIAL VAGINOSIS: ICD-10-CM

## 2020-07-02 DIAGNOSIS — Z12.31 VISIT FOR SCREENING MAMMOGRAM: ICD-10-CM

## 2020-07-02 DIAGNOSIS — Z00.00 ROUTINE HISTORY AND PHYSICAL EXAMINATION OF ADULT: Primary | ICD-10-CM

## 2020-07-02 DIAGNOSIS — L50.9 URTICARIA: ICD-10-CM

## 2020-07-02 DIAGNOSIS — J45.30 MILD PERSISTENT ASTHMA WITHOUT COMPLICATION: ICD-10-CM

## 2020-07-02 DIAGNOSIS — G47.00 INSOMNIA, UNSPECIFIED TYPE: ICD-10-CM

## 2020-07-02 DIAGNOSIS — E66.01 MORBID OBESITY, UNSPECIFIED OBESITY TYPE (H): ICD-10-CM

## 2020-07-02 DIAGNOSIS — L50.1 CHRONIC IDIOPATHIC URTICARIA: ICD-10-CM

## 2020-07-02 DIAGNOSIS — B96.89 BACTERIAL VAGINOSIS: ICD-10-CM

## 2020-07-02 DIAGNOSIS — K21.9 GASTROESOPHAGEAL REFLUX DISEASE, ESOPHAGITIS PRESENCE NOT SPECIFIED: ICD-10-CM

## 2020-07-02 DIAGNOSIS — N62 MACROMASTIA: ICD-10-CM

## 2020-07-02 DIAGNOSIS — M54.9 UPPER BACK PAIN: ICD-10-CM

## 2020-07-02 LAB
ERYTHROCYTE [DISTWIDTH] IN BLOOD BY AUTOMATED COUNT: 13.5 % (ref 10–15)
HBA1C MFR BLD: 5.6 % (ref 0–5.6)
HCT VFR BLD AUTO: 41.3 % (ref 35–47)
HGB BLD-MCNC: 13.3 G/DL (ref 11.7–15.7)
MCH RBC QN AUTO: 27.1 PG (ref 26.5–33)
MCHC RBC AUTO-ENTMCNC: 32.2 G/DL (ref 31.5–36.5)
MCV RBC AUTO: 84 FL (ref 78–100)
PLATELET # BLD AUTO: 306 10E9/L (ref 150–450)
RBC # BLD AUTO: 4.9 10E12/L (ref 3.8–5.2)
WBC # BLD AUTO: 7.6 10E9/L (ref 4–11)

## 2020-07-02 PROCEDURE — 36415 COLL VENOUS BLD VENIPUNCTURE: CPT | Performed by: PEDIATRICS

## 2020-07-02 PROCEDURE — 84443 ASSAY THYROID STIM HORMONE: CPT | Performed by: PEDIATRICS

## 2020-07-02 PROCEDURE — 99396 PREV VISIT EST AGE 40-64: CPT | Performed by: PEDIATRICS

## 2020-07-02 PROCEDURE — 83036 HEMOGLOBIN GLYCOSYLATED A1C: CPT | Performed by: PEDIATRICS

## 2020-07-02 PROCEDURE — 80061 LIPID PANEL: CPT | Performed by: PEDIATRICS

## 2020-07-02 PROCEDURE — 85027 COMPLETE CBC AUTOMATED: CPT | Performed by: PEDIATRICS

## 2020-07-02 PROCEDURE — 80053 COMPREHEN METABOLIC PANEL: CPT | Performed by: PEDIATRICS

## 2020-07-02 RX ORDER — METRONIDAZOLE 7.5 MG/G
1 GEL VAGINAL DAILY
Qty: 35 G | Refills: 11 | Status: SHIPPED | OUTPATIENT
Start: 2020-07-02 | End: 2021-11-03

## 2020-07-02 ASSESSMENT — ENCOUNTER SYMPTOMS
WEAKNESS: 0
JOINT SWELLING: 0
PARESTHESIAS: 0
HEMATOCHEZIA: 0
SORE THROAT: 0
DYSURIA: 0
DIZZINESS: 0
ABDOMINAL PAIN: 0
BREAST MASS: 0
EYE PAIN: 0
CONSTIPATION: 0
FREQUENCY: 0
CHILLS: 0
HEARTBURN: 0
NERVOUS/ANXIOUS: 0
SHORTNESS OF BREATH: 0
HEADACHES: 0
HEMATURIA: 0
MYALGIAS: 0
ARTHRALGIAS: 0
COUGH: 0
PALPITATIONS: 0
DIARRHEA: 1
FEVER: 0
NAUSEA: 0

## 2020-07-02 ASSESSMENT — MIFFLIN-ST. JEOR: SCORE: 1746.59

## 2020-07-02 NOTE — PATIENT INSTRUCTIONS
Trial melatonin and send me an update in 3 weeks - if not getting better, let's think about CBTi (cognitive behavioral therapy for insomnia)    [facility] - Dr Decker - book to check out    Call to reschedule your sigmoidoscopy    Call for visit with Dr Evans to talk about breast reduction surgery -[facility] - [place] [phone]   [email or web address]     Labs today - stop on your way out

## 2020-07-02 NOTE — PROGRESS NOTES
SUBJECTIVE:   CC: Stephanie Mendez is an 59 year old woman who presents for preventive health visit.     Healthy Habits:     Getting at least 3 servings of Calcium per day:  Yes    Bi-annual eye exam:  Yes    Dental care twice a year:  Yes    Sleep apnea or symptoms of sleep apnea:  None    Diet:  Other    Frequency of exercise:  1 day/week    Duration of exercise:  15-30 minutes    Taking medications regularly:  Yes    Medication side effects:  None    PHQ-2 Total Score: 0    Additional concerns today:  Yes      Today's PHQ-2 Score:   PHQ-2 ( 1999 Pfizer) 7/1/2020   Q1: Little interest or pleasure in doing things 0   Q2: Feeling down, depressed or hopeless 0   PHQ-2 Score 0   Q1: Little interest or pleasure in doing things Not at all   Q2: Feeling down, depressed or hopeless Not at all   PHQ-2 Score 0       Abuse: Current or Past(Physical, Sexual or Emotional)- No  Do you feel safe in your environment? Yes        Social History     Tobacco Use     Smoking status: Never Smoker     Smokeless tobacco: Never Used   Substance Use Topics     Alcohol use: No     Alcohol/week: 0.0 standard drinks     Frequency: Never     Binge frequency: Never     Comment: RARE       Alcohol Use 7/1/2020   Prescreen: >3 drinks/day or >7 drinks/week? No   Prescreen: >3 drinks/day or >7 drinks/week? -     Reviewed orders with patient.  Reviewed health maintenance and updated orders accordingly - Yes    Mammogram Screening: Patient over age 50, mutual decision to screen reflected in health maintenance.    Pertinent mammograms are reviewed under the imaging tab.  History of abnormal Pap smear: Status post benign hysterectomy. Health Maintenance and Surgical History updated.  PAP / HPV 12/18/2008 8/7/2006   PAP Acellular, No Test (Use with Unsat vials w/o brush) NIL     Reviewed and updated as needed this visit by clinical staff  Tobacco  Allergies  Med Hx  Surg Hx  Fam Hx  Soc Hx        Reviewed and updated as needed this visit by  Provider    Food allergies - multiple    Obesity - has struggled to lose weight.   Stress level always high.  Has struggled to lose weight.    Breakfast - rye bread, orange juice  Lunch - salad, mandarine  Dinner - protein source    Sleep - gets 4-5 hours per night.  Started melatonin 1-2 weeks ago - hoping that this helps.  Wakes up early and brain takes off - can't quiet thoughts.   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    Off ranitidine due to recall - urticaria symptoms much more difficulty to control.  Continues to follow closely with allergy.    Dermatology - has appt with Dr Avila for full skin check in the near future.     Asthma, angioedema under reasonable control.    Had sigmoidoscopy planned, but got cancelled due to Covid - plans to reschedule.    Hx of recurrent BV - uses metrogel intermittently with good results.    Intermittent nausea - uses zofran.    Reflux - controlled on current medications.    Continuing to have significant upper back and neck pain related to large breast size.   Feels like her breasts are crushing her rib cage and impacting her breathing.        Review of Systems   Constitutional: Negative for chills and fever.   HENT: Negative for congestion, ear pain, hearing loss and sore throat.    Eyes: Negative for pain and visual disturbance.   Respiratory: Negative for cough and shortness of breath.    Cardiovascular: Positive for peripheral edema. Negative for chest pain and palpitations.   Gastrointestinal: Positive for diarrhea. Negative for abdominal pain, constipation, heartburn, hematochezia and nausea.   Breasts:  Negative for tenderness, breast mass and discharge.   Genitourinary: Negative for dysuria, frequency, genital sores, hematuria, pelvic pain, urgency, vaginal bleeding and vaginal discharge.   Musculoskeletal: Negative for arthralgias, joint swelling and myalgias.   Skin: Negative for rash.   Neurological: Negative for dizziness, weakness,  "headaches and paresthesias.   Psychiatric/Behavioral: Negative for mood changes. The patient is not nervous/anxious.           OBJECTIVE:   /80 (BP Location: Right arm, Patient Position: Sitting, Cuff Size: Adult Large)   Pulse 93   Temp 98.2  F (36.8  C) (Tympanic)   Resp 18   Ht 1.651 m (5' 5\")   Wt 118.1 kg (260 lb 4.8 oz)   LMP 06/04/2010   SpO2 96%   BMI 43.32 kg/m     Wt Readings from Last 4 Encounters:   07/02/20 118.1 kg (260 lb 4.8 oz)   03/14/19 118.4 kg (261 lb)   09/26/18 115.2 kg (254 lb)   05/04/18 118.8 kg (262 lb)       Physical Exam  GENERAL: healthy, alert and no distress  EYES: Eyes grossly normal to inspection, PERRL and conjunctivae and sclerae normal  HENT: ear canals and TM's normal, nose and mouth without ulcers or lesions  NECK: no adenopathy, no asymmetry, masses, or scars and thyroid normal to palpation  RESP: lungs clear to auscultation - no rales, rhonchi or wheezes  BREAST: normal without masses, tenderness or nipple discharge and no palpable axillary masses or adenopathy  CV: regular rate and rhythm, normal S1 S2, stable II/VI anjali loudest at lusb,no click or rub, no peripheral edema and peripheral pulses strong  ABDOMEN: soft, nontender, no hepatosplenomegaly, no masses and bowel sounds normal  MS: no gross musculoskeletal defects noted, no edema  SKIN: no suspicious lesions or rashes  NEURO: Normal strength and tone, mentation intact and speech normal  PSYCH: mentation appears normal, affect normal/bright    Diagnostic Test Results:  pending    ASSESSMENT/PLAN:       ICD-10-CM    1. Routine history and physical examination of adult  Z00.00 TSH with free T4 reflex     CBC with platelets     Comprehensive metabolic panel     Lipid panel reflex to direct LDL Fasting     Hemoglobin A1c   2. Morbid obesity, unspecified obesity type (H)  E66.01 TSH with free T4 reflex     CBC with platelets     Comprehensive metabolic panel     Lipid panel reflex to direct LDL Fasting     " "Hemoglobin A1c    Discussed first strategy of prioritizing sleep, continuing with healthy eating, self care, activity   3. Chronic idiopathic urticaria  L50.1 CBC with platelets  Follows with Dr Mercado   4. Gastroesophageal reflux disease, esophagitis presence not specified  K21.9 CBC with platelets  Well controlled, continue current medications     5. Mild persistent asthma without complication  J45.30 Well controlled, continue current medications  Follows with Dr Mercado   6. Urticaria  L50.9 TSH with free T4 reflex     CBC with platelets  Chronically problematic, follows with Dr Mercado   7. Macromastia  N62 Encouraged patient to pursue breast reduction surgery - referral in place - information provided again   8. Upper back pain  M54.9 Secondary to macromastia, see above   9. Insomnia, unspecified type  G47.00 Plan trial of melatonin, recommended CBTi - patient will touch base with me in 3 weeks regarding melatonin efficacy   10. Visit for screening mammogram  Z12.31 MA Screen Bilateral w/Solomon   11. Bacterial vaginosis  N76.0 metroNIDAZOLE (METROGEL) 0.75 % vaginal gel  Continue to use intermittently as needed, to alert me if not improving    B96.89        COUNSELING:  Special attention given to:        Regular exercise       Healthy diet/nutrition       Vision screening       Immunizations    Declined: Pneumococcal due to Concerns about side effects/safety - hx of anaphylaxis to multiple substances           Colon cancer screening    Estimated body mass index is 43.32 kg/m  as calculated from the following:    Height as of this encounter: 1.651 m (5' 5\").    Weight as of this encounter: 118.1 kg (260 lb 4.8 oz).    Weight management plan: Discussed healthy diet and exercise guidelines     reports that she has never smoked. She has never used smokeless tobacco.      Counseling Resources:  ATP IV Guidelines  Pooled Cohorts Equation Calculator  Breast Cancer Risk Calculator  FRAX Risk Assessment  ICSI Preventive " Guidelines  Dietary Guidelines for Americans, 2010  USDA's MyPlate  ASA Prophylaxis  Lung CA Screening    Nancy Urban MD  [facility]

## 2020-07-03 LAB
ALBUMIN SERPL-MCNC: 3.7 G/DL (ref 3.4–5)
ALP SERPL-CCNC: 88 U/L (ref 40–150)
ALT SERPL W P-5'-P-CCNC: 29 U/L (ref 0–50)
ANION GAP SERPL CALCULATED.3IONS-SCNC: 5 MMOL/L (ref 3–14)
AST SERPL W P-5'-P-CCNC: 16 U/L (ref 0–45)
BILIRUB SERPL-MCNC: 0.5 MG/DL (ref 0.2–1.3)
BUN SERPL-MCNC: 15 MG/DL (ref 7–30)
CALCIUM SERPL-MCNC: 9.1 MG/DL (ref 8.5–10.1)
CHLORIDE SERPL-SCNC: 106 MMOL/L (ref 94–109)
CHOLEST SERPL-MCNC: 178 MG/DL
CO2 SERPL-SCNC: 28 MMOL/L (ref 20–32)
CREAT SERPL-MCNC: 0.73 MG/DL (ref 0.52–1.04)
GFR SERPL CREATININE-BSD FRML MDRD: 88 ML/MIN/{1.73_M2}
GLUCOSE SERPL-MCNC: 99 MG/DL (ref 70–99)
HDLC SERPL-MCNC: 36 MG/DL
LDLC SERPL CALC-MCNC: 120 MG/DL
NONHDLC SERPL-MCNC: 142 MG/DL
POTASSIUM SERPL-SCNC: 3.8 MMOL/L (ref 3.4–5.3)
PROT SERPL-MCNC: 7.9 G/DL (ref 6.8–8.8)
SODIUM SERPL-SCNC: 139 MMOL/L (ref 133–144)
TRIGL SERPL-MCNC: 108 MG/DL
TSH SERPL DL<=0.005 MIU/L-ACNC: 2.52 MU/L (ref 0.4–4)

## 2020-07-20 ENCOUNTER — TRANSFERRED RECORDS (OUTPATIENT)
Dept: HEALTH INFORMATION MANAGEMENT | Facility: CLINIC | Age: 60
End: 2020-07-20

## 2020-07-31 ENCOUNTER — TRANSFERRED RECORDS (OUTPATIENT)
Dept: HEALTH INFORMATION MANAGEMENT | Facility: CLINIC | Age: 60
End: 2020-07-31

## 2020-08-02 ENCOUNTER — E-VISIT (OUTPATIENT)
Dept: PEDIATRICS | Facility: CLINIC | Age: 60
End: 2020-08-02
Payer: COMMERCIAL

## 2020-08-02 DIAGNOSIS — H00.019 HORDEOLUM EXTERNUM, UNSPECIFIED LATERALITY: Primary | ICD-10-CM

## 2020-08-02 PROCEDURE — 99421 OL DIG E/M SVC 5-10 MIN: CPT | Performed by: PEDIATRICS

## 2020-08-03 RX ORDER — POLYMYXIN B SULFATE AND TRIMETHOPRIM 1; 10000 MG/ML; [USP'U]/ML
1 SOLUTION OPHTHALMIC 4 TIMES DAILY
Qty: 1 BOTTLE | Refills: 0 | Status: SHIPPED | OUTPATIENT
Start: 2020-08-03 | End: 2021-11-03

## 2020-08-04 NOTE — TELEPHONE ENCOUNTER
Antibiotics sent and mychart sent. Please call tomorrow wed and see how she's doing and route to me. thx!

## 2020-08-04 NOTE — TELEPHONE ENCOUNTER
Spoke to pt. Lazaro seems to be a bit better. Although eye drops seem to make her break out in hives around the eye. She remembers this happening before. She would like oral abx. OK? Julieta Bradshaw RN on 8/4/2020 at 2:28 PM

## 2020-08-05 NOTE — TELEPHONE ENCOUNTER
Call back to pt to see how she is doing  Left detailed message for pt to return call to the clinic    Thank you  America Kessler RN on 8/5/2020 at 8:25 AM

## 2020-08-06 NOTE — TELEPHONE ENCOUNTER
2nd call placed to pt  Pt states she is doing better and will let us know if she needs anything else  Thank you  America Kessler RN on 8/6/2020 at 12:31 PM

## 2020-08-18 ENCOUNTER — ANCILLARY PROCEDURE (OUTPATIENT)
Dept: MAMMOGRAPHY | Facility: CLINIC | Age: 60
End: 2020-08-18
Payer: COMMERCIAL

## 2020-08-18 DIAGNOSIS — Z12.31 VISIT FOR SCREENING MAMMOGRAM: ICD-10-CM

## 2020-08-18 PROCEDURE — 77063 BREAST TOMOSYNTHESIS BI: CPT | Mod: TC

## 2020-08-18 PROCEDURE — 77067 SCR MAMMO BI INCL CAD: CPT | Mod: TC

## 2020-11-20 ENCOUNTER — TELEPHONE (OUTPATIENT)
Dept: PEDIATRICS | Facility: CLINIC | Age: 60
End: 2020-11-20

## 2020-11-20 NOTE — TELEPHONE ENCOUNTER
Please contact patient to see if she would be ok with either one of the insurance recommended substitutions.      Nancy Urban MD  Internal Medicine - Pediatrics

## 2020-11-20 NOTE — TELEPHONE ENCOUNTER
Please advise if one of the covered alternatives would be appropriate for pt  Brittney Eaton RN, BSN

## 2020-11-20 NOTE — TELEPHONE ENCOUNTER
Fax received from pharmacy. Esomeprazole MAG dr 40 CAP  rejected/not covered.     Preferred drug - esomeprazole GRA 40mg   NDC # - [id number] or   Omeprazole CAP 40mg  NDC # - [id number]    [facility]  P- [phone] F - [phone]    Kristina Prater MA on 11/20/2020 at 10:00 AM

## 2020-11-21 NOTE — TELEPHONE ENCOUNTER
Forwarded to triage nursing - ok to call in new script for:    esomeprazole GRA 40mg   NDC # - [id number]    As requested by insurance and approved by patient.  1 pill po qam.  #30 with 11 refills.   I couldn't find this specific order in Epic.    Thanks!      Nancy Urban MD  Internal Medicine - Pediatrics

## 2021-01-14 ENCOUNTER — HEALTH MAINTENANCE LETTER (OUTPATIENT)
Age: 61
End: 2021-01-14

## 2021-01-20 ENCOUNTER — TRANSFERRED RECORDS (OUTPATIENT)
Dept: HEALTH INFORMATION MANAGEMENT | Facility: CLINIC | Age: 61
End: 2021-01-20
Payer: COMMERCIAL

## 2021-02-10 ENCOUNTER — MYC MEDICAL ADVICE (OUTPATIENT)
Dept: PEDIATRICS | Facility: CLINIC | Age: 61
End: 2021-02-10

## 2021-02-11 NOTE — TELEPHONE ENCOUNTER
Letter printed, stamps and faxed with Mammogram results from 8/2020 to Dr. Evans's office at [phone].     Sylvia Heard

## 2021-02-11 NOTE — TELEPHONE ENCOUNTER
Please print letter, stamp my signature, and fax to Dr Evans's office at fax #[phone]  Please also fax copy of mammogram from 8/2020.    Thanks!    Nancy Urban MD  Internal Medicine - Pediatrics

## 2021-02-23 ENCOUNTER — TRANSFERRED RECORDS (OUTPATIENT)
Dept: HEALTH INFORMATION MANAGEMENT | Facility: CLINIC | Age: 61
End: 2021-02-23

## 2021-04-22 ENCOUNTER — IMMUNIZATION (OUTPATIENT)
Dept: NURSING | Facility: CLINIC | Age: 61
End: 2021-04-22
Payer: COMMERCIAL

## 2021-04-22 PROCEDURE — 91301 PR COVID VAC MODERNA 100 MCG/0.5 ML IM: CPT

## 2021-04-22 PROCEDURE — 0011A PR COVID VAC MODERNA 100 MCG/0.5 ML IM: CPT

## 2021-05-20 ENCOUNTER — IMMUNIZATION (OUTPATIENT)
Dept: NURSING | Facility: CLINIC | Age: 61
End: 2021-05-20
Attending: INTERNAL MEDICINE
Payer: COMMERCIAL

## 2021-05-20 PROCEDURE — 91301 PR COVID VAC MODERNA 100 MCG/0.5 ML IM: CPT

## 2021-05-20 PROCEDURE — 0012A PR COVID VAC MODERNA 100 MCG/0.5 ML IM: CPT

## 2021-08-29 ENCOUNTER — HEALTH MAINTENANCE LETTER (OUTPATIENT)
Age: 61
End: 2021-08-29

## 2021-10-24 ENCOUNTER — HEALTH MAINTENANCE LETTER (OUTPATIENT)
Age: 61
End: 2021-10-24

## 2021-11-01 ASSESSMENT — ENCOUNTER SYMPTOMS
HEADACHES: 0
ABDOMINAL PAIN: 0
DIARRHEA: 0
BREAST MASS: 0
DYSURIA: 0
FEVER: 0
SORE THROAT: 0
HEMATOCHEZIA: 0
HEMATURIA: 0
PALPITATIONS: 0
SHORTNESS OF BREATH: 0
WEAKNESS: 0
PARESTHESIAS: 0
DIZZINESS: 0
EYE PAIN: 0
ARTHRALGIAS: 0
FREQUENCY: 0
HEARTBURN: 0
MYALGIAS: 0
JOINT SWELLING: 0
CONSTIPATION: 0
NAUSEA: 0
COUGH: 0
NERVOUS/ANXIOUS: 0
CHILLS: 0

## 2021-11-03 ENCOUNTER — OFFICE VISIT (OUTPATIENT)
Dept: PEDIATRICS | Facility: CLINIC | Age: 61
End: 2021-11-03
Payer: COMMERCIAL

## 2021-11-03 VITALS
WEIGHT: 271.6 LBS | HEART RATE: 85 BPM | SYSTOLIC BLOOD PRESSURE: 134 MMHG | OXYGEN SATURATION: 94 % | RESPIRATION RATE: 14 BRPM | TEMPERATURE: 97.5 F | DIASTOLIC BLOOD PRESSURE: 82 MMHG | BODY MASS INDEX: 45.25 KG/M2 | HEIGHT: 65 IN

## 2021-11-03 DIAGNOSIS — L50.1 CHRONIC IDIOPATHIC URTICARIA: ICD-10-CM

## 2021-11-03 DIAGNOSIS — E66.01 MORBID OBESITY, UNSPECIFIED OBESITY TYPE (H): ICD-10-CM

## 2021-11-03 DIAGNOSIS — N76.0 BACTERIAL VAGINOSIS: ICD-10-CM

## 2021-11-03 DIAGNOSIS — M54.9 CHRONIC UPPER BACK PAIN: ICD-10-CM

## 2021-11-03 DIAGNOSIS — B96.89 BACTERIAL VAGINOSIS: ICD-10-CM

## 2021-11-03 DIAGNOSIS — Z00.00 ROUTINE HISTORY AND PHYSICAL EXAMINATION OF ADULT: Primary | ICD-10-CM

## 2021-11-03 DIAGNOSIS — G89.29 CHRONIC UPPER BACK PAIN: ICD-10-CM

## 2021-11-03 DIAGNOSIS — Z12.31 VISIT FOR SCREENING MAMMOGRAM: ICD-10-CM

## 2021-11-03 DIAGNOSIS — K21.9 GASTROESOPHAGEAL REFLUX DISEASE, UNSPECIFIED WHETHER ESOPHAGITIS PRESENT: ICD-10-CM

## 2021-11-03 DIAGNOSIS — J45.30 MILD PERSISTENT ASTHMA WITHOUT COMPLICATION: ICD-10-CM

## 2021-11-03 DIAGNOSIS — R11.0 NAUSEA: ICD-10-CM

## 2021-11-03 DIAGNOSIS — N62 MACROMASTIA: ICD-10-CM

## 2021-11-03 LAB
ERYTHROCYTE [DISTWIDTH] IN BLOOD BY AUTOMATED COUNT: 13.5 % (ref 10–15)
HBA1C MFR BLD: 5.7 % (ref 0–5.6)
HCT VFR BLD AUTO: 40.3 % (ref 35–47)
HGB BLD-MCNC: 13 G/DL (ref 11.7–15.7)
MCH RBC QN AUTO: 27.6 PG (ref 26.5–33)
MCHC RBC AUTO-ENTMCNC: 32.3 G/DL (ref 31.5–36.5)
MCV RBC AUTO: 86 FL (ref 78–100)
PLATELET # BLD AUTO: 293 10E3/UL (ref 150–450)
RBC # BLD AUTO: 4.71 10E6/UL (ref 3.8–5.2)
WBC # BLD AUTO: 7.2 10E3/UL (ref 4–11)

## 2021-11-03 PROCEDURE — 83036 HEMOGLOBIN GLYCOSYLATED A1C: CPT | Performed by: PEDIATRICS

## 2021-11-03 PROCEDURE — 80061 LIPID PANEL: CPT | Performed by: PEDIATRICS

## 2021-11-03 PROCEDURE — 36415 COLL VENOUS BLD VENIPUNCTURE: CPT | Performed by: PEDIATRICS

## 2021-11-03 PROCEDURE — 80053 COMPREHEN METABOLIC PANEL: CPT | Performed by: PEDIATRICS

## 2021-11-03 PROCEDURE — 99396 PREV VISIT EST AGE 40-64: CPT | Performed by: PEDIATRICS

## 2021-11-03 PROCEDURE — 99213 OFFICE O/P EST LOW 20 MIN: CPT | Mod: 25 | Performed by: PEDIATRICS

## 2021-11-03 PROCEDURE — 85027 COMPLETE CBC AUTOMATED: CPT | Performed by: PEDIATRICS

## 2021-11-03 PROCEDURE — 84443 ASSAY THYROID STIM HORMONE: CPT | Performed by: PEDIATRICS

## 2021-11-03 RX ORDER — PREDNISONE 20 MG/1
TABLET ORAL
Status: ON HOLD | COMMUNITY
Start: 2021-08-17 | End: 2023-05-01

## 2021-11-03 RX ORDER — AZELASTINE 1 MG/ML
2 SPRAY, METERED NASAL
COMMUNITY
End: 2021-11-03

## 2021-11-03 RX ORDER — FLUTICASONE PROPIONATE AND SALMETEROL XINAFOATE 115; 21 UG/1; UG/1
AEROSOL, METERED RESPIRATORY (INHALATION)
COMMUNITY
Start: 2021-08-17

## 2021-11-03 RX ORDER — FEXOFENADINE HCL 180 MG/1
180 TABLET ORAL DAILY
Qty: 90 TABLET | Refills: 0 | COMMUNITY
Start: 2021-11-03

## 2021-11-03 RX ORDER — ONDANSETRON 4 MG/1
4 TABLET, ORALLY DISINTEGRATING ORAL EVERY 6 HOURS PRN
Qty: 20 TABLET | Refills: 11 | Status: SHIPPED | OUTPATIENT
Start: 2021-11-03 | End: 2023-01-18

## 2021-11-03 RX ORDER — METRONIDAZOLE 7.5 MG/G
1 GEL VAGINAL DAILY
Qty: 35 G | Refills: 11 | Status: SHIPPED | OUTPATIENT
Start: 2021-11-03 | End: 2023-01-18

## 2021-11-03 RX ORDER — EPINEPHRINE 0.3 MG/.3ML
0.3 INJECTION SUBCUTANEOUS
COMMUNITY
End: 2021-11-03

## 2021-11-03 ASSESSMENT — ENCOUNTER SYMPTOMS
HEMATOCHEZIA: 0
HEADACHES: 0
CONSTIPATION: 0
SHORTNESS OF BREATH: 0
CHILLS: 0
MYALGIAS: 0
HEMATURIA: 0
FREQUENCY: 0
DIZZINESS: 0
COUGH: 0
HEARTBURN: 0
BREAST MASS: 0
NAUSEA: 0
PARESTHESIAS: 0
FEVER: 0
PALPITATIONS: 0
JOINT SWELLING: 0
EYE PAIN: 0
ARTHRALGIAS: 0
DYSURIA: 0
ABDOMINAL PAIN: 0
SORE THROAT: 0
WEAKNESS: 0
DIARRHEA: 0
NERVOUS/ANXIOUS: 0

## 2021-11-03 ASSESSMENT — MIFFLIN-ST. JEOR: SCORE: 1787.85

## 2021-11-03 NOTE — PROGRESS NOTES
SUBJECTIVE:   CC: Stephanie Mendez is an 61 year old woman who presents for preventive health visit.   Patient has been advised of split billing requirements and indicates understanding: Yes     Healthy Habits:     Getting at least 3 servings of Calcium per day:  Yes    Bi-annual eye exam:  Yes    Dental care twice a year:  Yes    Sleep apnea or symptoms of sleep apnea:  None    Diet:  Other    Frequency of exercise:  None    Taking medications regularly:  Yes    Medication side effects:  None    PHQ-2 Total Score: 0    Additional concerns today:  Yes    - Would like to go over current medications     Today's PHQ-2 Score:   PHQ-2 ( 1999 Pfizer) 11/1/2021   Q1: Little interest or pleasure in doing things 0   Q2: Feeling down, depressed or hopeless 0   PHQ-2 Score 0   Q1: Little interest or pleasure in doing things Not at all   Q2: Feeling down, depressed or hopeless Not at all   PHQ-2 Score 0     ACT Total Scores 9/26/2018 3/14/2019 11/3/2021   ACT TOTAL SCORE - - -   ASTHMA ER VISITS - - -   ASTHMA HOSPITALIZATIONS - - -   ACT TOTAL SCORE (Goal Greater than or Equal to 20) 20 22 21   In the past 12 months, how many times did you visit the emergency room for your asthma without being admitted to the hospital? 0 0 0   In the past 12 months, how many times were you hospitalized overnight because of your asthma? 0 0 0     Abuse: Current or Past (Physical, Sexual or Emotional) - No  Do you feel safe in your environment? Yes    Social History     Tobacco Use     Smoking status: Never Smoker     Smokeless tobacco: Never Used   Substance Use Topics     Alcohol use: No     Alcohol/week: 0.0 standard drinks     Comment: RARE     Alcohol Use 11/1/2021   Prescreen: >3 drinks/day or >7 drinks/week? No   Prescreen: >3 drinks/day or >7 drinks/week? -     Reviewed orders with patient.  Reviewed health maintenance and updated orders accordingly - Yes  Lab work is in process    Breast Cancer Screening:    FHS-7:   Breast CA Risk  Assessment (FHS-7) 11/1/2021   Did any of your first-degree relatives have breast or ovarian cancer? Yes   Did any of your relatives have bilateral breast cancer? No   Did any man in your family have breast cancer? No   Did any woman in your family have breast and ovarian cancer? Yes   Did any woman in your family have breast cancer before age 50 y? No   Do you have 2 or more relatives with breast and/or ovarian cancer? No   Do you have 2 or more relatives with breast and/or bowel cancer? Yes       Mammogram Screening: Recommended mammography every 1-2 years with patient discussion and risk factor consideration  Pertinent mammograms are reviewed under the imaging tab.    History of abnormal Pap smear: Status post benign hysterectomy. Health Maintenance and Surgical History updated.  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  Follows closely with Dr Mercado in allergy.  On xolair, singluair, allegra    Asthma - struggling a bit more recently due to reacting to cleaning supplies at her father's facility  ACT today of 21    Upper back pain/macromastia - remain problematic - upper back pain, choking sensation when lying down.  Breast size exacerbates respiratory symptoms and pain x years.    GERD - well controlled with PPI    Flex sig this summer - normal at colorectal surgeons office.    Due for Mammogram    Gets recurrence BV - uses metronidazole vaginally with improvement    Frustrated by weight gain, but incredibly stressful year - discussed self care, continued movement when able, prioritizing sleep        Review of Systems  "  Constitutional: Negative for chills and fever.   HENT: Negative for congestion, ear pain, hearing loss and sore throat.    Eyes: Negative for pain and visual disturbance.   Respiratory: Negative for cough and shortness of breath.    Cardiovascular: Negative for chest pain, palpitations and peripheral edema.   Gastrointestinal: Negative for abdominal pain, constipation, diarrhea, heartburn, hematochezia and nausea.   Breasts:  Negative for tenderness, breast mass and discharge.   Genitourinary: Negative for dysuria, frequency, genital sores, hematuria, pelvic pain, urgency, vaginal bleeding and vaginal discharge.   Musculoskeletal: Negative for arthralgias, joint swelling and myalgias.   Skin: Negative for rash.   Neurological: Negative for dizziness, weakness, headaches and paresthesias.   Psychiatric/Behavioral: Negative for mood changes. The patient is not nervous/anxious.           OBJECTIVE:   /82 (BP Location: Right arm, Patient Position: Sitting, Cuff Size: Adult Large)   Pulse 85   Temp 97.5  F (36.4  C) (Tympanic)   Resp 14   Ht 1.651 m (5' 5\")   Wt 123.2 kg (271 lb 9.6 oz)   LMP 06/04/2010   SpO2 94%   BMI 45.20 kg/m     Wt Readings from Last 4 Encounters:   11/03/21 123.2 kg (271 lb 9.6 oz)   07/02/20 118.1 kg (260 lb 4.8 oz)   03/14/19 118.4 kg (261 lb)   09/26/18 115.2 kg (254 lb)       Physical Exam  GENERAL: healthy, alert and no distress  EYES: Eyes grossly normal to inspection, PERRL and conjunctivae and sclerae normal  HENT: ear canals and TM's normal, nose and mouth without ulcers or lesions  NECK: no adenopathy, no asymmetry, masses, or scars and thyroid normal to palpation  RESP: lungs clear to auscultation - no rales, rhonchi or wheezes  BREAST: large breast size, breast tissue normal without masses, tenderness or nipple discharge and no palpable axillary masses or adenopathy  CV: regular rate and rhythm, normal S1 S2, no S3 or S4, no murmur, click or rub, no peripheral edema and " peripheral pulses strong  ABDOMEN: soft, nontender, no hepatosplenomegaly, no masses and bowel sounds normal  MS: no gross musculoskeletal defects noted, no edema  SKIN: hyperpigmented discoloration lower legs bilaterally  NEURO: Normal strength and tone, mentation intact and speech normal  PSYCH: mentation appears normal, affect normal/bright    Diagnostic Test Results:  pending    ASSESSMENT/PLAN:       ICD-10-CM    1. Routine history and physical examination of adult  Z00.00 TSH with free T4 reflex     CBC with platelets     Comprehensive metabolic panel (BMP + Alb, Alk Phos, ALT, AST, Total. Bili, TP)     Lipid panel reflex to direct LDL Fasting     Hemoglobin A1c     TSH with free T4 reflex     CBC with platelets     Comprehensive metabolic panel (BMP + Alb, Alk Phos, ALT, AST, Total. Bili, TP)     Lipid panel reflex to direct LDL Fasting     Hemoglobin A1c   2. Chronic idiopathic urticaria  L50.1 Stable, follows with Dr Mercado   3. Mild persistent asthma without complication  J45.30 Discussed pretreating for exposure to father's facility with albuterol, continue current controlled regimen - also followed by Dr Mercado   4. Morbid obesity, unspecified obesity type (H)  E66.01 Discussed trying to get in some time for self care - patient has overwhelming care duties for father and sons at present     5. Chronic upper back pain  M54.9 Plastic Surgery Referral placed for consideration of breast reduction surgery    G89.29    6. Gastroesophageal reflux disease, unspecified whether esophagitis present  K21.9 Well controlled on PPI   7. Macromastia  N62 Plastic Surgery Referral   8. Bacterial vaginosis  N76.0 metroNIDAZOLE (METROGEL) 0.75 % vaginal gel    B96.89    9. Nausea  R11.0 ondansetron (ZOFRAN-ODT) 4 MG ODT tab   10. Visit for screening mammogram  Z12.31 MA Screen Bilateral w/Solomon         COUNSELING:  Reviewed preventive health counseling, as reflected in patient instructions    Estimated body mass index is  "45.2 kg/m  as calculated from the following:    Height as of this encounter: 1.651 m (5' 5\").    Weight as of this encounter: 123.2 kg (271 lb 9.6 oz).    Weight management plan: Discussed healthy diet and exercise guidelines    She reports that she has never smoked. She has never used smokeless tobacco.      Counseling Resources:  ATP IV Guidelines  Pooled Cohorts Equation Calculator  Breast Cancer Risk Calculator  BRCA-Related Cancer Risk Assessment: FHS-7 Tool  FRAX Risk Assessment  ICSI Preventive Guidelines  Dietary Guidelines for Americans, 2010  USDA's MyPlate  ASA Prophylaxis  Lung CA Screening    Nancy Urban MD  [facility]  "

## 2021-11-03 NOTE — PATIENT INSTRUCTIONS
Pretreat with albuterol before you see your day    Labs today    We'll help you schedule your mammogram    Be good to yourself - keep up your painting    Plastic surgery referral - call for visit          Preventive Health Recommendations  Female Ages 50 - 64    Yearly exam: See your health care provider every year in order to  o Review health changes.   o Discuss preventive care.    o Review your medicines if your doctor has prescribed any.      Get a Pap test every three years (unless you have an abnormal result and your provider advises testing more often).    If you get Pap tests with HPV test, you only need to test every 5 years, unless you have an abnormal result.     You do not need a Pap test if your uterus was removed (hysterectomy) and you have not had cancer.    You should be tested each year for STDs (sexually transmitted diseases) if you're at risk.     Have a mammogram every 1 to 2 years.    Have a colonoscopy at age 50, or have a yearly FIT test (stool test). These exams screen for colon cancer.      Have a cholesterol test every 5 years, or more often if advised.    Have a diabetes test (fasting glucose) every three years. If you are at risk for diabetes, you should have this test more often.     If you are at risk for osteoporosis (brittle bone disease), think about having a bone density scan (DEXA).    Shots: Get a flu shot each year. Get a tetanus shot every 10 years.    Nutrition:     Eat at least 5 servings of fruits and vegetables each day.    Eat whole-grain bread, whole-wheat pasta and brown rice instead of white grains and rice.    Get adequate Calcium and Vitamin D.     Lifestyle    Exercise at least 150 minutes a week (30 minutes a day, 5 days a week). This will help you control your weight and prevent disease.    Limit alcohol to one drink per day.    No smoking.     Wear sunscreen to prevent skin cancer.     See your dentist every six months for an exam and cleaning.    See your eye  doctor every 1 to 2 years.

## 2021-11-04 LAB
ALBUMIN SERPL-MCNC: 3.3 G/DL (ref 3.4–5)
ALP SERPL-CCNC: 82 U/L (ref 40–150)
ALT SERPL W P-5'-P-CCNC: 28 U/L (ref 0–50)
ANION GAP SERPL CALCULATED.3IONS-SCNC: 10 MMOL/L (ref 3–14)
AST SERPL W P-5'-P-CCNC: 15 U/L (ref 0–45)
BILIRUB SERPL-MCNC: 0.6 MG/DL (ref 0.2–1.3)
BUN SERPL-MCNC: 15 MG/DL (ref 7–30)
CALCIUM SERPL-MCNC: 9.6 MG/DL (ref 8.5–10.1)
CHLORIDE BLD-SCNC: 106 MMOL/L (ref 94–109)
CHOLEST SERPL-MCNC: 174 MG/DL
CO2 SERPL-SCNC: 23 MMOL/L (ref 20–32)
CREAT SERPL-MCNC: 0.72 MG/DL (ref 0.52–1.04)
FASTING STATUS PATIENT QL REPORTED: YES
GFR SERPL CREATININE-BSD FRML MDRD: 89 ML/MIN/1.73M2
GLUCOSE BLD-MCNC: 106 MG/DL (ref 70–99)
HDLC SERPL-MCNC: 32 MG/DL
LDLC SERPL CALC-MCNC: 119 MG/DL
NONHDLC SERPL-MCNC: 142 MG/DL
POTASSIUM BLD-SCNC: 4.1 MMOL/L (ref 3.4–5.3)
PROT SERPL-MCNC: 7.4 G/DL (ref 6.8–8.8)
SODIUM SERPL-SCNC: 139 MMOL/L (ref 133–144)
TRIGL SERPL-MCNC: 113 MG/DL
TSH SERPL DL<=0.005 MIU/L-ACNC: 3.25 MU/L (ref 0.4–4)

## 2021-11-04 ASSESSMENT — ASTHMA QUESTIONNAIRES: ACT_TOTALSCORE: 21

## 2021-12-23 ENCOUNTER — ANCILLARY PROCEDURE (OUTPATIENT)
Dept: MAMMOGRAPHY | Facility: CLINIC | Age: 61
End: 2021-12-23
Attending: PEDIATRICS
Payer: COMMERCIAL

## 2021-12-23 DIAGNOSIS — Z12.31 VISIT FOR SCREENING MAMMOGRAM: ICD-10-CM

## 2021-12-23 PROCEDURE — 77063 BREAST TOMOSYNTHESIS BI: CPT | Mod: TC | Performed by: RADIOLOGY

## 2021-12-23 PROCEDURE — 77067 SCR MAMMO BI INCL CAD: CPT | Mod: TC | Performed by: RADIOLOGY

## 2022-10-15 ENCOUNTER — HEALTH MAINTENANCE LETTER (OUTPATIENT)
Age: 62
End: 2022-10-15

## 2022-11-29 ENCOUNTER — TRANSFERRED RECORDS (OUTPATIENT)
Dept: HEALTH INFORMATION MANAGEMENT | Facility: CLINIC | Age: 62
End: 2022-11-29

## 2022-12-22 ENCOUNTER — ANCILLARY PROCEDURE (OUTPATIENT)
Dept: MAMMOGRAPHY | Facility: CLINIC | Age: 62
End: 2022-12-22
Attending: PEDIATRICS
Payer: COMMERCIAL

## 2022-12-22 DIAGNOSIS — Z12.31 VISIT FOR SCREENING MAMMOGRAM: ICD-10-CM

## 2022-12-22 PROCEDURE — 77063 BREAST TOMOSYNTHESIS BI: CPT | Mod: TC | Performed by: RADIOLOGY

## 2022-12-22 PROCEDURE — 77067 SCR MAMMO BI INCL CAD: CPT | Mod: TC | Performed by: RADIOLOGY

## 2022-12-30 ENCOUNTER — TRANSFERRED RECORDS (OUTPATIENT)
Dept: PEDIATRICS | Facility: CLINIC | Age: 62
End: 2022-12-30

## 2023-01-16 SDOH — HEALTH STABILITY: PHYSICAL HEALTH: ON AVERAGE, HOW MANY DAYS PER WEEK DO YOU ENGAGE IN MODERATE TO STRENUOUS EXERCISE (LIKE A BRISK WALK)?: 0 DAYS

## 2023-01-16 SDOH — HEALTH STABILITY: PHYSICAL HEALTH: ON AVERAGE, HOW MANY MINUTES DO YOU ENGAGE IN EXERCISE AT THIS LEVEL?: 0 MIN

## 2023-01-16 SDOH — ECONOMIC STABILITY: TRANSPORTATION INSECURITY
IN THE PAST 12 MONTHS, HAS THE LACK OF TRANSPORTATION KEPT YOU FROM MEDICAL APPOINTMENTS OR FROM GETTING MEDICATIONS?: NO

## 2023-01-16 SDOH — ECONOMIC STABILITY: FOOD INSECURITY: WITHIN THE PAST 12 MONTHS, YOU WORRIED THAT YOUR FOOD WOULD RUN OUT BEFORE YOU GOT MONEY TO BUY MORE.: NEVER TRUE

## 2023-01-16 SDOH — ECONOMIC STABILITY: TRANSPORTATION INSECURITY
IN THE PAST 12 MONTHS, HAS LACK OF TRANSPORTATION KEPT YOU FROM MEETINGS, WORK, OR FROM GETTING THINGS NEEDED FOR DAILY LIVING?: NO

## 2023-01-16 SDOH — ECONOMIC STABILITY: INCOME INSECURITY: IN THE LAST 12 MONTHS, WAS THERE A TIME WHEN YOU WERE NOT ABLE TO PAY THE MORTGAGE OR RENT ON TIME?: NO

## 2023-01-16 SDOH — ECONOMIC STABILITY: FOOD INSECURITY: WITHIN THE PAST 12 MONTHS, THE FOOD YOU BOUGHT JUST DIDN'T LAST AND YOU DIDN'T HAVE MONEY TO GET MORE.: NEVER TRUE

## 2023-01-16 SDOH — ECONOMIC STABILITY: INCOME INSECURITY: HOW HARD IS IT FOR YOU TO PAY FOR THE VERY BASICS LIKE FOOD, HOUSING, MEDICAL CARE, AND HEATING?: NOT HARD AT ALL

## 2023-01-16 ASSESSMENT — SOCIAL DETERMINANTS OF HEALTH (SDOH)
HOW OFTEN DO YOU GET TOGETHER WITH FRIENDS OR RELATIVES?: NEVER
DO YOU BELONG TO ANY CLUBS OR ORGANIZATIONS SUCH AS CHURCH GROUPS UNIONS, FRATERNAL OR ATHLETIC GROUPS, OR SCHOOL GROUPS?: NO
HOW OFTEN DO YOU ATTEND CHURCH OR RELIGIOUS SERVICES?: NEVER
IN A TYPICAL WEEK, HOW MANY TIMES DO YOU TALK ON THE PHONE WITH FAMILY, FRIENDS, OR NEIGHBORS?: NEVER

## 2023-01-16 ASSESSMENT — ENCOUNTER SYMPTOMS
HEARTBURN: 0
DIZZINESS: 0
NAUSEA: 0
HEADACHES: 0
WEAKNESS: 0
ABDOMINAL PAIN: 0
HEMATOCHEZIA: 0
CONSTIPATION: 0
NERVOUS/ANXIOUS: 0
SORE THROAT: 0
COUGH: 0
BREAST MASS: 0
ARTHRALGIAS: 0
PALPITATIONS: 0
DYSURIA: 0
PARESTHESIAS: 0
CHILLS: 0
EYE PAIN: 0
MYALGIAS: 0
SHORTNESS OF BREATH: 0
JOINT SWELLING: 0
FREQUENCY: 0
HEMATURIA: 0
DIARRHEA: 0
FEVER: 0

## 2023-01-16 ASSESSMENT — ASTHMA QUESTIONNAIRES
QUESTION_2 LAST FOUR WEEKS HOW OFTEN HAVE YOU HAD SHORTNESS OF BREATH: NOT AT ALL
ACT_TOTALSCORE: 17
QUESTION_4 LAST FOUR WEEKS HOW OFTEN HAVE YOU USED YOUR RESCUE INHALER OR NEBULIZER MEDICATION (SUCH AS ALBUTEROL): ONE OR TWO TIMES PER DAY
QUESTION_3 LAST FOUR WEEKS HOW OFTEN DID YOUR ASTHMA SYMPTOMS (WHEEZING, COUGHING, SHORTNESS OF BREATH, CHEST TIGHTNESS OR PAIN) WAKE YOU UP AT NIGHT OR EARLIER THAN USUAL IN THE MORNING: TWO OR THREE NIGHTS A WEEK
QUESTION_5 LAST FOUR WEEKS HOW WOULD YOU RATE YOUR ASTHMA CONTROL: SOMEWHAT CONTROLLED
ACT_TOTALSCORE: 17
QUESTION_1 LAST FOUR WEEKS HOW MUCH OF THE TIME DID YOUR ASTHMA KEEP YOU FROM GETTING AS MUCH DONE AT WORK, SCHOOL OR AT HOME: NONE OF THE TIME

## 2023-01-16 ASSESSMENT — LIFESTYLE VARIABLES
HOW OFTEN DO YOU HAVE A DRINK CONTAINING ALCOHOL: NEVER
SKIP TO QUESTIONS 9-10: 1
HOW MANY STANDARD DRINKS CONTAINING ALCOHOL DO YOU HAVE ON A TYPICAL DAY: PATIENT DOES NOT DRINK
AUDIT-C TOTAL SCORE: 0
HOW OFTEN DO YOU HAVE SIX OR MORE DRINKS ON ONE OCCASION: NEVER

## 2023-01-18 ENCOUNTER — OFFICE VISIT (OUTPATIENT)
Dept: PEDIATRICS | Facility: CLINIC | Age: 63
End: 2023-01-18
Payer: COMMERCIAL

## 2023-01-18 VITALS
RESPIRATION RATE: 14 BRPM | HEART RATE: 74 BPM | TEMPERATURE: 97.3 F | WEIGHT: 261.9 LBS | DIASTOLIC BLOOD PRESSURE: 84 MMHG | SYSTOLIC BLOOD PRESSURE: 144 MMHG | OXYGEN SATURATION: 99 % | HEIGHT: 65 IN | BODY MASS INDEX: 43.64 KG/M2

## 2023-01-18 DIAGNOSIS — J45.30 MILD PERSISTENT ASTHMA WITHOUT COMPLICATION: ICD-10-CM

## 2023-01-18 DIAGNOSIS — G89.29 CHRONIC UPPER BACK PAIN: ICD-10-CM

## 2023-01-18 DIAGNOSIS — N62 MACROMASTIA: ICD-10-CM

## 2023-01-18 DIAGNOSIS — Z00.00 ROUTINE HISTORY AND PHYSICAL EXAMINATION OF ADULT: Primary | ICD-10-CM

## 2023-01-18 DIAGNOSIS — L60.9 LONGITUDINAL NAIL RIDGE: ICD-10-CM

## 2023-01-18 DIAGNOSIS — B96.89 BACTERIAL VAGINOSIS: ICD-10-CM

## 2023-01-18 DIAGNOSIS — R73.01 IFG (IMPAIRED FASTING GLUCOSE): ICD-10-CM

## 2023-01-18 DIAGNOSIS — K21.9 GASTROESOPHAGEAL REFLUX DISEASE, UNSPECIFIED WHETHER ESOPHAGITIS PRESENT: ICD-10-CM

## 2023-01-18 DIAGNOSIS — N76.0 BACTERIAL VAGINOSIS: ICD-10-CM

## 2023-01-18 DIAGNOSIS — R03.0 ELEVATED BP WITHOUT DIAGNOSIS OF HYPERTENSION: ICD-10-CM

## 2023-01-18 DIAGNOSIS — M54.9 CHRONIC UPPER BACK PAIN: ICD-10-CM

## 2023-01-18 DIAGNOSIS — E83.52 HYPERCALCEMIA: ICD-10-CM

## 2023-01-18 DIAGNOSIS — R11.0 NAUSEA: ICD-10-CM

## 2023-01-18 DIAGNOSIS — L50.1 CHRONIC IDIOPATHIC URTICARIA: ICD-10-CM

## 2023-01-18 LAB
ALBUMIN SERPL BCG-MCNC: 4.2 G/DL (ref 3.5–5.2)
ALP SERPL-CCNC: 89 U/L (ref 35–104)
ALT SERPL W P-5'-P-CCNC: 20 U/L (ref 10–35)
ANION GAP SERPL CALCULATED.3IONS-SCNC: 12 MMOL/L (ref 7–15)
AST SERPL W P-5'-P-CCNC: 20 U/L (ref 10–35)
BILIRUB SERPL-MCNC: 0.5 MG/DL
BUN SERPL-MCNC: 17.3 MG/DL (ref 8–23)
CALCIUM SERPL-MCNC: 10.4 MG/DL (ref 8.8–10.2)
CHLORIDE SERPL-SCNC: 104 MMOL/L (ref 98–107)
CHOLEST SERPL-MCNC: 173 MG/DL
CREAT SERPL-MCNC: 0.71 MG/DL (ref 0.51–0.95)
DEPRECATED HCO3 PLAS-SCNC: 26 MMOL/L (ref 22–29)
GFR SERPL CREATININE-BSD FRML MDRD: >90 ML/MIN/1.73M2
GLUCOSE SERPL-MCNC: 99 MG/DL (ref 70–99)
HBA1C MFR BLD: 5.8 % (ref 0–5.6)
HDLC SERPL-MCNC: 49 MG/DL
IRON BINDING CAPACITY (ROCHE): 324 UG/DL (ref 240–430)
IRON SATN MFR SERPL: 18 % (ref 15–46)
IRON SERPL-MCNC: 57 UG/DL (ref 37–145)
LDLC SERPL CALC-MCNC: 109 MG/DL
NONHDLC SERPL-MCNC: 124 MG/DL
POTASSIUM SERPL-SCNC: 3.8 MMOL/L (ref 3.4–5.3)
PROT SERPL-MCNC: 7.3 G/DL (ref 6.4–8.3)
SODIUM SERPL-SCNC: 142 MMOL/L (ref 136–145)
TRIGL SERPL-MCNC: 76 MG/DL
TSH SERPL DL<=0.005 MIU/L-ACNC: 3.63 UIU/ML (ref 0.3–4.2)
VIT B12 SERPL-MCNC: 930 PG/ML (ref 232–1245)

## 2023-01-18 PROCEDURE — 99396 PREV VISIT EST AGE 40-64: CPT | Performed by: PEDIATRICS

## 2023-01-18 PROCEDURE — 99214 OFFICE O/P EST MOD 30 MIN: CPT | Mod: 25 | Performed by: PEDIATRICS

## 2023-01-18 PROCEDURE — 80053 COMPREHEN METABOLIC PANEL: CPT | Performed by: PEDIATRICS

## 2023-01-18 PROCEDURE — 80061 LIPID PANEL: CPT | Performed by: PEDIATRICS

## 2023-01-18 PROCEDURE — 84443 ASSAY THYROID STIM HORMONE: CPT | Performed by: PEDIATRICS

## 2023-01-18 PROCEDURE — 83540 ASSAY OF IRON: CPT | Performed by: PEDIATRICS

## 2023-01-18 PROCEDURE — 36415 COLL VENOUS BLD VENIPUNCTURE: CPT | Performed by: PEDIATRICS

## 2023-01-18 PROCEDURE — 83036 HEMOGLOBIN GLYCOSYLATED A1C: CPT | Performed by: PEDIATRICS

## 2023-01-18 PROCEDURE — 83550 IRON BINDING TEST: CPT | Performed by: PEDIATRICS

## 2023-01-18 PROCEDURE — 82607 VITAMIN B-12: CPT | Performed by: PEDIATRICS

## 2023-01-18 RX ORDER — ALBUTEROL SULFATE 0.83 MG/ML
2.5 SOLUTION RESPIRATORY (INHALATION) EVERY 4 HOURS PRN
Qty: 90 ML | Refills: 3 | Status: SHIPPED | OUTPATIENT
Start: 2023-01-18 | End: 2024-02-14

## 2023-01-18 RX ORDER — METRONIDAZOLE 7.5 MG/G
1 GEL VAGINAL DAILY
Qty: 35 G | Refills: 11 | Status: SHIPPED | OUTPATIENT
Start: 2023-01-18 | End: 2024-02-14

## 2023-01-18 RX ORDER — ONDANSETRON 4 MG/1
4 TABLET, ORALLY DISINTEGRATING ORAL EVERY 6 HOURS PRN
Qty: 20 TABLET | Refills: 11 | Status: SHIPPED | OUTPATIENT
Start: 2023-01-18 | End: 2024-02-14

## 2023-01-18 ASSESSMENT — ENCOUNTER SYMPTOMS
WEAKNESS: 0
PARESTHESIAS: 0
CONSTIPATION: 0
ARTHRALGIAS: 0
COUGH: 0
DIZZINESS: 0
NERVOUS/ANXIOUS: 0
DYSURIA: 0
DIARRHEA: 0
BREAST MASS: 0
NAUSEA: 0
SORE THROAT: 0
FEVER: 0
PALPITATIONS: 0
HEMATOCHEZIA: 0
EYE PAIN: 0
HEARTBURN: 0
ABDOMINAL PAIN: 0
HEMATURIA: 0
CHILLS: 0
JOINT SWELLING: 0
MYALGIAS: 0
SHORTNESS OF BREATH: 0
HEADACHES: 0
FREQUENCY: 0

## 2023-01-18 ASSESSMENT — PAIN SCALES - GENERAL: PAINLEVEL: MODERATE PAIN (5)

## 2023-01-18 NOTE — LETTER
My Asthma Action Plan    Name: Stephanie Mendez   YOB: 1960  Date: 1/18/2023   My doctor: Nancy Urban MD   My clinic: [facility]        My Control Medicine: Advair  My Rescue Medicine: Albuterol (Proair/Ventolin/Proventil HFA) 2-4 puffs EVERY 4 HOURS as needed. Use a spacer if recommended by your provider.  My Oral Steroid Medicine: none My Asthma Severity:   Mild Persistent  Know your asthma triggers: upper respiratory infections and pollens               GREEN ZONE   Good Control    I feel good    No cough or wheeze    Can work, sleep and play without asthma symptoms       Take your asthma control medicine every day.     1. If exercise triggers your asthma, take your rescue medication    15 minutes before exercise or sports, and    During exercise if you have asthma symptoms  2. Spacer to use with inhaler: If you have a spacer, make sure to use it with your inhaler             YELLOW ZONE Getting Worse  I have ANY of these:    I do not feel good    Cough or wheeze    Chest feels tight    Wake up at night   1. Keep taking your Green Zone medications  2. Start taking your rescue medicine:    every 20 minutes for up to 1 hour. Then every 4 hours for 24-48 hours.  3. If you stay in the Yellow Zone for more than 12-24 hours, contact your doctor.  4. If you do not return to the Green Zone in 12-24 hours or you get worse, start taking your oral steroid medicine if prescribed by your provider.           RED ZONE Medical Alert - Get Help  I have ANY of these:    I feel awful    Medicine is not helping    Breathing getting harder    Trouble walking or talking    Nose opens wide to breathe       1. Take your rescue medicine NOW  2. If your provider has prescribed an oral steroid medicine, start taking it NOW  3. Call your doctor NOW  4. If you are still in the Red Zone after 20 minutes and you have not reached your doctor:    Take your rescue medicine again and    Call 911 or go  to the emergency room right away    See your regular doctor within 2 weeks of an Emergency Room or Urgent Care visit for follow-up treatment.          Annual Reminders:  Meet with Asthma Educator,  Flu Shot in the Fall, consider Pneumonia Vaccination for patients with asthma (aged 19 and older).    Pharmacy: [facility] [id number] [place] [address]    Electronically signed by Nancy Urban MD   Date: 01/18/23                      Asthma Triggers  How To Control Things That Make Your Asthma Worse    Triggers are things that make your asthma worse.  Look at the list below to help you find your triggers and what you can do about them.  You can help prevent asthma flare-ups by staying away from your triggers.      Trigger                                                          What you can do   Cigarette Smoke  Tobacco smoke can make asthma worse. Do not allow smoking in your home, car or around you.  Be sure no one smokes at a child s day care or school.  If you smoke, ask your health care provider for ways to help you quit.  Ask family members to quit too.  Ask your health care provider for a referral to Quit Plan to help you quit smoking, or call 2-818-534-PLAN.     Colds, Flu, Bronchitis  These are common triggers of asthma. Wash your hands often.  Don t touch your eyes, nose or mouth.  Get a flu shot every year.     Dust Mites  These are tiny bugs that live in cloth or carpet. They are too small to see. Wash sheets and blankets in hot water every week.   Encase pillows and mattress in dust mite proof covers.  Avoid having carpet if you can. If you have carpet, vacuum weekly.   Use a dust mask and HEPA vacuum.   Pollen and Outdoor Mold  Some people are allergic to trees, grass, or weed pollen, or molds. Try to keep your windows closed.  Limit time out doors when pollen count is high.   Ask you health care provider about taking medicine during allergy season.     Animal Dander  Some people are  allergic to skin flakes, urine or saliva from pets with fur or feathers. Keep pets with fur or feathers out of your home.    If you can t keep the pet outdoors, then keep the pet out of your bedroom.  Keep the bedroom door closed.  Keep pets off cloth furniture and away from stuffed toys.     Mice, Rats, and Cockroaches   Some people are allergic to the waste from these pests.   Cover food and garbage.  Clean up spills and food crumbs.  Store grease in the refrigerator.   Keep food out of the bedroom.   Indoor Mold  This can be a trigger if your home has high moisture. Fix leaking faucets, pipes, or other sources of water.   Clean moldy surfaces.  Dehumidify basement if it is damp and smelly.   Smoke, Strong Odors, and Sprays  These can reduce air quality. Stay away from strong odors and sprays, such as perfume, powder, hair spray, paints, smoke incense, paint, cleaning products, candles and new carpet.   Exercise or Sports  Some people with asthma have this trigger. Be active!  Ask your doctor about taking medicine before sports or exercise to prevent symptoms.    Warm up for 5-10 minutes before and after sports or exercise.     Other Triggers of Asthma  Cold air:  Cover your nose and mouth with a scarf.  Sometimes laughing or crying can be a trigger.  Some medicines and food can trigger asthma.

## 2023-01-18 NOTE — PROGRESS NOTES
SUBJECTIVE:   CC: Stephanie is an 62 year old who presents for preventive health visit.   Patient has been advised of split billing requirements and indicates understanding: Yes     Healthy Habits:     Getting at least 3 servings of Calcium per day:  Yes    Bi-annual eye exam:  Yes    Dental care twice a year:  Yes    Sleep apnea or symptoms of sleep apnea:  None    Diet:  Other    Frequency of exercise:  None    Taking medications regularly:  Yes    Medication side effects:  None    PHQ-2 Total Score: 0    Additional concerns today:  Yes      Today's PHQ-2 Score:   PHQ-2 ( 1999 Pfizer) 1/16/2023   Q1: Little interest or pleasure in doing things 0   Q2: Feeling down, depressed or hopeless 0   PHQ-2 Score 0   PHQ-2 Total Score (12-17 Years)- Positive if 3 or more points; Administer PHQ-A if positive -   Q1: Little interest or pleasure in doing things Not at all   Q2: Feeling down, depressed or hopeless Not at all   PHQ-2 Score 0       Have you ever done Advance Care Planning? (For example, a Health Directive, POLST, or a discussion with a medical provider or your loved ones about your wishes): No, advance care planning information given to patient to review.  Patient plans to discuss their wishes with loved ones or provider.      Social History     Tobacco Use     Smoking status: Never     Smokeless tobacco: Never   Substance Use Topics     Alcohol use: No     Comment: RARE       Alcohol Use 1/16/2023   Prescreen: >3 drinks/day or >7 drinks/week? Not Applicable   Prescreen: >3 drinks/day or >7 drinks/week? -       Reviewed orders with patient.  Reviewed health maintenance and updated orders accordingly - Yes  Labs reviewed in EPIC  BP Readings from Last 3 Encounters:   01/18/23 (!) 144/84   11/03/21 134/82   07/02/20 138/80    Wt Readings from Last 3 Encounters:   01/18/23 118.8 kg (261 lb 14.4 oz)   11/03/21 123.2 kg (271 lb 9.6 oz)   07/02/20 118.1 kg (260 lb 4.8 oz)                    Breast Cancer  Screening:    FHS-7:   Breast CA Risk Assessment (FHS-7) 11/1/2021 12/23/2021 12/22/2022 12/22/2022 1/16/2023   Did any of your first-degree relatives have breast or ovarian cancer? Yes Yes Yes - Yes   Did any of your relatives have bilateral breast cancer? No No No - No   Did any man in your family have breast cancer? No No No - No   Did any woman in your family have breast and ovarian cancer? Yes No No - Yes   Did any woman in your family have breast cancer before age 50 y? No No No - No   Do you have 2 or more relatives with breast and/or ovarian cancer? No No No - No   Do you have 2 or more relatives with breast and/or bowel cancer? Yes No No Yes Yes       Mammogram Screening: Recommended mammography every 1-2 years with patient discussion and risk factor consideration  Pertinent mammograms are reviewed under the imaging tab.    History of abnormal Pap smear: Status post benign hysterectomy. Health Maintenance and Surgical History updated.  PAP / HPV 12/18/2008 8/7/2006   PAP (Historical) Acellular, No Test (Use with Unsat vials w/o brush) NIL     Reviewed and updated as needed this visit by clinical staff   Tobacco  Allergies               Reviewed and updated as needed this visit by Provider                   Right shoulder pain - chronic - following with orthopedics and physical therapy.  Calcific tendonitis - improved with steroid injection.      Macromastia - chronic upper back and shoulder pain related to breast size.  Contemplating breast reduction surgery    Chronic urticaria/angioedema - followed by Dr Mercado - many allergies - stable treatment regiemn    Weight - very frustrated by weight and interested in anything to help improve.  IFG and BMI of 43.      Nail ridges - taking MVI and and iron supplement    Intermittent BV - responds to prn topical treatment          Review of Systems   Constitutional: Negative for chills and fever.   HENT: Negative for congestion, ear pain, hearing loss and sore  "throat.    Eyes: Negative for pain and visual disturbance.   Respiratory: Negative for cough and shortness of breath.    Cardiovascular: Positive for peripheral edema. Negative for chest pain and palpitations.   Gastrointestinal: Negative for abdominal pain, constipation, diarrhea, heartburn, hematochezia and nausea.   Breasts:  Negative for tenderness, breast mass and discharge.   Genitourinary: Negative for dysuria, frequency, genital sores, hematuria, pelvic pain, urgency, vaginal bleeding and vaginal discharge.   Musculoskeletal: Negative for arthralgias, joint swelling and myalgias.   Skin: Negative for rash.   Neurological: Negative for dizziness, weakness, headaches and paresthesias.   Psychiatric/Behavioral: Negative for mood changes. The patient is not nervous/anxious.           OBJECTIVE:   BP (!) 144/84   Pulse 74   Temp 97.3  F (36.3  C) (Tympanic)   Resp 14   Ht 1.651 m (5' 5\")   Wt 118.8 kg (261 lb 14.4 oz)   LMP 06/04/2010   SpO2 99%   BMI 43.58 kg/m     Wt Readings from Last 4 Encounters:   01/18/23 118.8 kg (261 lb 14.4 oz)   11/03/21 123.2 kg (271 lb 9.6 oz)   07/02/20 118.1 kg (260 lb 4.8 oz)   03/14/19 118.4 kg (261 lb)       Physical Exam  GENERAL: healthy, alert and no distress  EYES: Eyes grossly normal to inspection, PERRL and conjunctivae and sclerae normal  HENT: ear canals and TM's normal, nose and mouth without ulcers or lesions  NECK: no adenopathy, no asymmetry, masses, or scars and thyroid normal to palpation  RESP: lungs clear to auscultation - no rales, rhonchi or wheezes  BREAST: pendulous breasts without masses, tenderness or nipple discharge and no palpable axillary masses or adenopathy  CV: regular rate and rhythm, normal S1 S2, no S3 or S4, no murmur, click or rub, no peripheral edema and peripheral pulses strong  ABDOMEN: soft, nontender, no hepatosplenomegaly, no masses and bowel sounds normal  MS: no gross musculoskeletal defects noted, no edema  SKIN: no suspicious " lesions or rashes  NEURO: Normal strength and tone, mentation intact and speech normal  PSYCH: mentation appears normal, affect normal/bright    Diagnostic Test Results:  pending    ASSESSMENT/PLAN:       ICD-10-CM    1. Routine history and physical examination of adult  Z00.00 Lipid panel reflex to direct LDL Fasting     Comprehensive metabolic panel (BMP + Alb, Alk Phos, ALT, AST, Total. Bili, TP)     Hemoglobin A1c     TSH with free T4 reflex     Iron and iron binding capacity     Vitamin B12      2. Mild persistent asthma without complication  J45.30 albuterol (PROVENTIL) (2.5 MG/3ML) 0.083% neb solution  Well controlled, continue current medications        3. Chronic idiopathic urticaria  L50.1 Followed by Dr Mercado      4. Gastroesophageal reflux disease, unspecified whether esophagitis present  K21.9 Well controlled on current PPI - continue       5. IFG (impaired fasting glucose)  R73.01 Hemoglobin A1c      6. Macromastia  N62 Adult Plastic Surgery  Referral    Recommend consult for breast reduction      7. Chronic upper back pain  M54.9 Adult Plastic Surgery  Referral    G89.29       8. Bacterial vaginosis  N76.0 metroNIDAZOLE (METROGEL) 0.75 % vaginal gel    B96.89       9. Nausea  R11.0 ondansetron (ZOFRAN ODT) 4 MG ODT tab      10. BMI 40.0-44.9, adult (H)  Z68.41 liraglutide - Weight Management (SAXENDA) 18 MG/3ML pen    New medication reviewed today - anticipate that patient will benefit.  Close follow up scheduled.      11. Longitudinal nail ridge  L60.9 Iron and iron binding capacity     Vitamin B12      12. Elevated BP without diagnosis of hypertension  R03.0 Plan recheck at next visit in 6 weeks            COUNSELING:  Reviewed preventive health counseling, as reflected in patient instructions        She reports that she has never smoked. She has never used smokeless tobacco.      Nancy Urban MD  [facility]

## 2023-01-18 NOTE — PATIENT INSTRUCTIONS
Trial of saxenda for weight loss!    Call for visit with plastic surgery    We'll recheck blood pressure at our next visit    Keep up your work with physical therapy     Labs today    Ask Dr Mercado about pneumonia vaccine        Preventive Health Recommendations  Female Ages 50 - 64    Yearly exam: See your health care provider every year in order to  Review health changes.   Discuss preventive care.    Review your medicines if your doctor has prescribed any.    Get a Pap test every three years (unless you have an abnormal result and your provider advises testing more often).  If you get Pap tests with HPV test, you only need to test every 5 years, unless you have an abnormal result.   You do not need a Pap test if your uterus was removed (hysterectomy) and you have not had cancer.  You should be tested each year for STDs (sexually transmitted diseases) if you're at risk.   Have a mammogram every 1 to 2 years.  Have a colonoscopy at age 50, or have a yearly FIT test (stool test). These exams screen for colon cancer.    Have a cholesterol test every 5 years, or more often if advised.  Have a diabetes test (fasting glucose) every three years. If you are at risk for diabetes, you should have this test more often.   If you are at risk for osteoporosis (brittle bone disease), think about having a bone density scan (DEXA).    Shots: Get a flu shot each year. Get a tetanus shot every 10 years.    Nutrition:   Eat at least 5 servings of fruits and vegetables each day.  Eat whole-grain bread, whole-wheat pasta and brown rice instead of white grains and rice.  Get adequate Calcium and Vitamin D.     Lifestyle  Exercise at least 150 minutes a week (30 minutes a day, 5 days a week). This will help you control your weight and prevent disease.  Limit alcohol to one drink per day.  No smoking.   Wear sunscreen to prevent skin cancer.   See your dentist every six months for an exam and cleaning.  See your eye doctor every 1 to 2  years.

## 2023-01-30 ENCOUNTER — TRANSFERRED RECORDS (OUTPATIENT)
Dept: HEALTH INFORMATION MANAGEMENT | Facility: CLINIC | Age: 63
End: 2023-01-30

## 2023-02-01 ENCOUNTER — MYC MEDICAL ADVICE (OUTPATIENT)
Dept: PEDIATRICS | Facility: CLINIC | Age: 63
End: 2023-02-01
Payer: COMMERCIAL

## 2023-02-06 RX ORDER — TOPIRAMATE 25 MG/1
25 TABLET, FILM COATED ORAL 2 TIMES DAILY
Qty: 60 TABLET | Refills: 3 | Status: SHIPPED | OUTPATIENT
Start: 2023-02-06 | End: 2023-03-01

## 2023-03-01 ENCOUNTER — OFFICE VISIT (OUTPATIENT)
Dept: PEDIATRICS | Facility: CLINIC | Age: 63
End: 2023-03-01
Payer: COMMERCIAL

## 2023-03-01 VITALS
BODY MASS INDEX: 44.15 KG/M2 | HEIGHT: 65 IN | DIASTOLIC BLOOD PRESSURE: 82 MMHG | RESPIRATION RATE: 16 BRPM | TEMPERATURE: 97.8 F | OXYGEN SATURATION: 100 % | WEIGHT: 265 LBS | HEART RATE: 80 BPM | SYSTOLIC BLOOD PRESSURE: 126 MMHG

## 2023-03-01 DIAGNOSIS — R29.898 LOW MUSCLE TONE: ICD-10-CM

## 2023-03-01 DIAGNOSIS — M54.9 UPPER BACK PAIN: ICD-10-CM

## 2023-03-01 DIAGNOSIS — N62 MACROMASTIA: ICD-10-CM

## 2023-03-01 DIAGNOSIS — E66.01 MORBID OBESITY, UNSPECIFIED OBESITY TYPE (H): ICD-10-CM

## 2023-03-01 DIAGNOSIS — M79.89 SOFT TISSUE MASS: Primary | ICD-10-CM

## 2023-03-01 PROCEDURE — 99214 OFFICE O/P EST MOD 30 MIN: CPT | Performed by: PEDIATRICS

## 2023-03-01 ASSESSMENT — PAIN SCALES - GENERAL: PAINLEVEL: NO PAIN (0)

## 2023-03-01 ASSESSMENT — ASTHMA QUESTIONNAIRES
QUESTION_5 LAST FOUR WEEKS HOW WOULD YOU RATE YOUR ASTHMA CONTROL: COMPLETELY CONTROLLED
ACT_TOTALSCORE: 25
ACT_TOTALSCORE: 25
QUESTION_4 LAST FOUR WEEKS HOW OFTEN HAVE YOU USED YOUR RESCUE INHALER OR NEBULIZER MEDICATION (SUCH AS ALBUTEROL): NOT AT ALL
QUESTION_1 LAST FOUR WEEKS HOW MUCH OF THE TIME DID YOUR ASTHMA KEEP YOU FROM GETTING AS MUCH DONE AT WORK, SCHOOL OR AT HOME: NONE OF THE TIME
QUESTION_2 LAST FOUR WEEKS HOW OFTEN HAVE YOU HAD SHORTNESS OF BREATH: NOT AT ALL
QUESTION_3 LAST FOUR WEEKS HOW OFTEN DID YOUR ASTHMA SYMPTOMS (WHEEZING, COUGHING, SHORTNESS OF BREATH, CHEST TIGHTNESS OR PAIN) WAKE YOU UP AT NIGHT OR EARLIER THAN USUAL IN THE MORNING: NOT AT ALL

## 2023-03-01 NOTE — PROGRESS NOTES
"  Assessment & Plan       ICD-10-CM    1. Soft tissue mass  M79.89 US Chest Wall    Plan ultrasound of area of interest on chest wall, most suspicious for lipoma.      2. Morbid obesity, unspecified obesity type (H)  E66.01  Long discussion today, planning to hold on any weight loss related medications no after breast reduction surgery.  We will monitor patient's response to breast reduction surgery and hope that improvements in her breathing and body fluid mechanics help facilitate weight loss that without the need for other medications.      3. Macromastia  N62  significant, following with plastic surgery and plans for breast reduction surgery in May      4. Upper back pain  M54.9  Secondary to breast size, severe      5. Low muscle tone  M62.89  Identified with recent physical therapy visits, continue physical therapy               BMI:   Estimated body mass index is 44.1 kg/m  as calculated from the following:    Height as of this encounter: 1.651 m (5' 5\").    Weight as of this encounter: 120.2 kg (265 lb).   Weight management plan: Discussed healthy diet and exercise guidelines    See Patient Instructions    Return in about 6 weeks (around 4/12/2023) for Follow up, with me, in person.    Nancy Urban MD  [facility] YOVANY Brown is a 62 year old, presenting for the following health issues:  RECHECK      History of Present Illness       Reason for visit:  Obesity    She eats 4 or more servings of fruits and vegetables daily.She consumes 1 sweetened beverage(s) daily.She exercises with enough effort to increase her heart rate 9 or less minutes per day.  She exercises with enough effort to increase her heart rate 3 or less days per week.   She is taking medications regularly.     Wt Readings from Last 4 Encounters:   03/01/23 120.2 kg (265 lb)   01/18/23 118.8 kg (261 lb 14.4 oz)   11/03/21 123.2 kg (271 lb 9.6 oz)   07/02/20 118.1 kg (260 lb 4.8 oz)     Patient presents to " "follow-up multiple medical issues.    Macromastia: With severe physical limitations secondary to large breast size, causing upper back and neck pain and restrictive lung disease pattern.  Patient has been working with physical therapy specialist at [facility], who has determined the patient is having to use accessory muscles to breathe related to the crushing weight of her breasts on her rib cage.  A number of her muscular issues arise from stress from underlying macromastia and she had to change her movement patterns in many ways to deal with this.  She is also recently been noted to have low muscle tone which is contributing to her difficulties with exercise.    At her last visit, we discussed a trial of a medication to help with weight loss.  Unfortunately, GLP-1 agonist were not covered.  We discussed starting Topamax, but patient was hesitant about related side effects.  Patient is now interested in waiting until after her breast reduction surgery to see how she recovers and her body responds prior to starting any new medications.    Patient is also noticed a superficial soft tissue mass of the upper right chest wall that has been present for years and unchanged.  She is wondering about having this investigated before her upcoming breast reduction surgery.          Objective    /82 (Cuff Size: Adult Large)   Pulse 80   Temp 97.8  F (36.6  C) (Tympanic)   Resp 16   Ht 1.651 m (5' 5\")   Wt 120.2 kg (265 lb)   LMP 06/04/2010   SpO2 100%   BMI 44.10 kg/m    Body mass index is 44.1 kg/m .  Physical Exam   GENERAL: healthy, alert and no distress  Chest wall: 1 cm diameter freely mobile, rubbery mass under the skin of the right anterior chest superior to the breast tissue  MS: Deep shoulder grooves and tense upper back and neck musculature  PSYCH: mentation appears normal, affect normal/bright    Nancy Urban MD          "

## 2023-03-01 NOTE — PATIENT INSTRUCTIONS
[facility] Radiology [phone] - call to schedule your chest wall ultrasound

## 2023-03-08 ENCOUNTER — HOSPITAL ENCOUNTER (OUTPATIENT)
Dept: ULTRASOUND IMAGING | Facility: CLINIC | Age: 63
Discharge: HOME | End: 2023-03-08
Attending: PEDIATRICS
Payer: COMMERCIAL

## 2023-03-08 DIAGNOSIS — M79.89 SOFT TISSUE MASS: ICD-10-CM

## 2023-03-08 PROCEDURE — 76642 ULTRASOUND BREAST LIMITED: CPT | Mod: RT

## 2023-03-13 ENCOUNTER — TRANSFERRED RECORDS (OUTPATIENT)
Dept: HEALTH INFORMATION MANAGEMENT | Facility: CLINIC | Age: 63
End: 2023-03-13

## 2023-04-12 ASSESSMENT — ASTHMA QUESTIONNAIRES
QUESTION_4 LAST FOUR WEEKS HOW OFTEN HAVE YOU USED YOUR RESCUE INHALER OR NEBULIZER MEDICATION (SUCH AS ALBUTEROL): THREE OR MORE TIMES PER DAY
ACT_TOTALSCORE: 20
QUESTION_1 LAST FOUR WEEKS HOW MUCH OF THE TIME DID YOUR ASTHMA KEEP YOU FROM GETTING AS MUCH DONE AT WORK, SCHOOL OR AT HOME: NONE OF THE TIME
QUESTION_5 LAST FOUR WEEKS HOW WOULD YOU RATE YOUR ASTHMA CONTROL: WELL CONTROLLED
ACT_TOTALSCORE: 20
QUESTION_3 LAST FOUR WEEKS HOW OFTEN DID YOUR ASTHMA SYMPTOMS (WHEEZING, COUGHING, SHORTNESS OF BREATH, CHEST TIGHTNESS OR PAIN) WAKE YOU UP AT NIGHT OR EARLIER THAN USUAL IN THE MORNING: NOT AT ALL
QUESTION_2 LAST FOUR WEEKS HOW OFTEN HAVE YOU HAD SHORTNESS OF BREATH: NOT AT ALL

## 2023-04-13 ENCOUNTER — OFFICE VISIT (OUTPATIENT)
Dept: PEDIATRICS | Facility: CLINIC | Age: 63
End: 2023-04-13
Payer: COMMERCIAL

## 2023-04-13 VITALS
BODY MASS INDEX: 45.29 KG/M2 | HEART RATE: 78 BPM | HEIGHT: 65 IN | OXYGEN SATURATION: 100 % | DIASTOLIC BLOOD PRESSURE: 88 MMHG | WEIGHT: 271.8 LBS | TEMPERATURE: 96.9 F | SYSTOLIC BLOOD PRESSURE: 130 MMHG | RESPIRATION RATE: 20 BRPM

## 2023-04-13 DIAGNOSIS — N62 MACROMASTIA: ICD-10-CM

## 2023-04-13 DIAGNOSIS — Z01.818 PREOP GENERAL PHYSICAL EXAM: Primary | ICD-10-CM

## 2023-04-13 DIAGNOSIS — L50.1 CHRONIC IDIOPATHIC URTICARIA: ICD-10-CM

## 2023-04-13 DIAGNOSIS — Z88.9 MULTIPLE DRUG ALLERGIES: ICD-10-CM

## 2023-04-13 DIAGNOSIS — E83.52 HYPERCALCEMIA: ICD-10-CM

## 2023-04-13 DIAGNOSIS — Z91.040 LATEX ALLERGY: ICD-10-CM

## 2023-04-13 DIAGNOSIS — J45.30 MILD PERSISTENT ASTHMA WITHOUT COMPLICATION: ICD-10-CM

## 2023-04-13 LAB
ANION GAP SERPL CALCULATED.3IONS-SCNC: 13 MMOL/L (ref 7–15)
BUN SERPL-MCNC: 15.6 MG/DL (ref 8–23)
CALCIUM SERPL-MCNC: 9.8 MG/DL (ref 8.8–10.2)
CHLORIDE SERPL-SCNC: 105 MMOL/L (ref 98–107)
CREAT SERPL-MCNC: 0.72 MG/DL (ref 0.51–0.95)
DEPRECATED CALCIDIOL+CALCIFEROL SERPL-MC: 53 UG/L (ref 20–75)
DEPRECATED HCO3 PLAS-SCNC: 23 MMOL/L (ref 22–29)
ERYTHROCYTE [DISTWIDTH] IN BLOOD BY AUTOMATED COUNT: 14.2 % (ref 10–15)
GFR SERPL CREATININE-BSD FRML MDRD: >90 ML/MIN/1.73M2
GLUCOSE SERPL-MCNC: 88 MG/DL (ref 70–99)
HCT VFR BLD AUTO: 39 % (ref 35–47)
HGB BLD-MCNC: 12.4 G/DL (ref 11.7–15.7)
MCH RBC QN AUTO: 27.3 PG (ref 26.5–33)
MCHC RBC AUTO-ENTMCNC: 31.8 G/DL (ref 31.5–36.5)
MCV RBC AUTO: 86 FL (ref 78–100)
PLATELET # BLD AUTO: 309 10E3/UL (ref 150–450)
POTASSIUM SERPL-SCNC: 4.4 MMOL/L (ref 3.4–5.3)
PTH-INTACT SERPL-MCNC: 36 PG/ML (ref 15–65)
RBC # BLD AUTO: 4.54 10E6/UL (ref 3.8–5.2)
SODIUM SERPL-SCNC: 141 MMOL/L (ref 136–145)
WBC # BLD AUTO: 7.6 10E3/UL (ref 4–11)

## 2023-04-13 PROCEDURE — 99214 OFFICE O/P EST MOD 30 MIN: CPT | Performed by: PEDIATRICS

## 2023-04-13 PROCEDURE — 83970 ASSAY OF PARATHORMONE: CPT | Performed by: PEDIATRICS

## 2023-04-13 PROCEDURE — 80048 BASIC METABOLIC PNL TOTAL CA: CPT | Performed by: PEDIATRICS

## 2023-04-13 PROCEDURE — 82306 VITAMIN D 25 HYDROXY: CPT | Performed by: PEDIATRICS

## 2023-04-13 PROCEDURE — 36415 COLL VENOUS BLD VENIPUNCTURE: CPT | Performed by: PEDIATRICS

## 2023-04-13 PROCEDURE — 85027 COMPLETE CBC AUTOMATED: CPT | Performed by: PEDIATRICS

## 2023-04-13 ASSESSMENT — PAIN SCALES - GENERAL: PAINLEVEL: NO PAIN (0)

## 2023-04-13 NOTE — PROGRESS NOTES
[facility] [place]  [address]  [address]  [place] [address]  Phone: [phone]  Fax: [phone]  Primary Provider: Nancy Pelayo  Pre-op Performing Provider: NANCY PELAYO      PREOPERATIVE EVALUATION:  Today's date: 4/13/2023    Stephanie Mendez is a 62 year old female who presents for a preoperative evaluation.       View : No data to display.              Surgical Information:  Surgery/Procedure: BILATERAL REDUCTION MAMMOPLASTY, WITH FREE NIPPLE GRAFTS  Surgery Location: [facility]  Surgeon: Ottoniel Garcia MD  Surgery Date: 05/01/2023  Time of Surgery: 7:30 AM  Where patient plans to recover: At home with family  Fax number for surgical facility: Note does not need to be faxed, will be available electronically in Epic.    Assessment & Plan     The proposed surgical procedure is considered INTERMEDIATE risk.      ICD-10-CM    1. Preop general physical exam  Z01.818 CBC with platelets      2. Macromastia  N62       3. Mild persistent asthma without complication  J45.30       4. Multiple drug allergies  Z88.9 Please note drug allergies        5. Chronic idiopathic urticaria  L50.1       6. Latex allergy  Z91.040 Also very sensitive to adhesives and allergic to steri strips!      7. Hypercalcemia  E83.52 Vitamin D Deficiency     Parathyroid Hormone Intact     Basic metabolic panel  (Ca, Cl, CO2, Creat, Gluc, K, Na, BUN)    Mild hypercalcemia with physical labs - will repeat today      8. BMI 40.0-44.9, adult (H)  Z68.41                Risks and Recommendations:  The patient has the following additional risks and recommendations for perioperative complications:   - Morbid obesity (BMI >40)   - multiple allergies, adhesive allergy    Medication Instructions:  Patient to take allegra, doxepin morning of surgery and hold remainder of medications    RECOMMENDATION:  APPROVAL GIVEN to proceed with proposed procedure, without further  diagnostic evaluation.    6}    Subjective     HPI related to upcoming procedure: planning breast reduction to treat macromastia that is causing pronounced musculoskeletal and respiratory problems.          4/12/2023    10:24 AM   Preop Questions   1. Have you ever had a heart attack or stroke? No   2. Have you ever had surgery on your heart or blood vessels, such as a stent placement, a coronary artery bypass, or surgery on an artery in your head, neck, heart, or legs? No   3. Do you have chest pain with activity? No   4. Do you have a history of  heart failure? No   5. Do you currently have a cold, bronchitis or symptoms of other infection? No   6. Do you have a cough, shortness of breath, or wheezing? No   7. Do you or anyone in your family have previous history of blood clots? YES - mother - DVT with surgeries   8. Do you or does anyone in your family have a serious bleeding problem such as prolonged bleeding following surgeries or cuts? No   9. Have you ever had problems with anemia or been told to take iron pills? YES - but normal most recently   10. Have you had any abnormal blood loss such as black, tarry or bloody stools, or abnormal vaginal bleeding? No   11. Have you ever had a blood transfusion? No   12. Are you willing to have a blood transfusion if it is medically needed before, during, or after your surgery? Yes   13. Have you or any of your relatives ever had problems with anesthesia? YES - severe PONV - does better with zofran and scopolamine patch in recovery   14. Do you have sleep apnea, excessive snoring or daytime drowsiness? No   15. Do you have any artifical heart valves or other implanted medical devices like a pacemaker, defibrillator, or continuous glucose monitor? No   16. Do you have artificial joints? No   17. Are you allergic to latex? Yes - rash - allergic to latex and adhesives - particularly steri strips       Health Care Directive:  Patient has a Health Care Directive on  file      Preoperative Review of :   reviewed - no record of controlled substances prescribed.      Status of Chronic Conditions:  Asthma - mild persistent - well controlled and followed by Dr Mercado.  No recent exacerbations.      Multiple drug allergies, idiopathic urticaria, hx of anaphylaxis - on doxepin, singulair, allegra.  Followed by Dr Mercado.    Walking and doing her exercises daily    Review of Systems  Constitutional, neuro, ENT, endocrine, pulmonary, cardiac, gastrointestinal, genitourinary, musculoskeletal, integument and psychiatric systems are negative, except as otherwise noted.    Patient Active Problem List    Diagnosis Date Noted     Low muscle tone 03/01/2023     Priority: Medium     Hepatic cyst 02/16/2016     Priority: Medium     Chronic idiopathic urticaria      Priority: Medium     Allergy, food      Priority: Medium     Murmur, heart      Priority: Medium     Other back pain 11/19/2015     Priority: Medium     Morbid obesity, unspecified obesity type (H) 11/19/2015     Priority: Medium     Anaphylaxis 01/28/2013     Priority: Medium     Emergency plan for moderate to severe allergic reaction    1. EpiPen.  O.K. To repeat EpiPen at 15 minutes if not better, or sooner if getting worse  2. Call 911  3. Doxepin 20mg  4. Benadryl 50mg or zyrtec 10 mg  5. Prednisone 40mg  6. Proair 8 puffs x 1  7. Call emergency contacts    Updated 1/28/2013    Nancy Urban MD  Internal Medicine - Pediatrics         Ventral hernia 10/23/2012     Priority: Medium     CARDIOVASCULAR SCREENING; LDL GOAL LESS THAN 160 02/10/2010     Priority: Medium     Angioedema 07/15/2009     Priority: Medium     Urticaria 07/15/2009     Priority: Medium     GERD (gastroesophageal reflux disease) 09/16/2008     Priority: Medium     Allergic rhinitis 04/06/2006     Priority: Medium     Problem list name updated by automated process. Provider to review       Mild persistent asthma 06/09/2005     Priority: Medium     Female  genital symptoms 06/30/2004     Priority: Medium     Problem list name updated by automated process. Provider to review        Past Medical History:   Diagnosis Date     Allergic rhinitis, cause unspecified      Allergy, food      Anemia, unspecified      Arthritis 2014    toe and 2016 slight in knee     Cancer (H) 2005    basil cell carsinoma     Chronic idiopathic urticaria      Chronic maxillary sinusitis     recurrent fungal infection (remote)     DEPRESSION      Esophageal reflux      Irregular heart beat      Mild intermittent asthma      Murmur, heart     12/1/2015 3/6 systolic murmur heard - echo ordered     Personal history of other malignant neoplasm of skin     Basal cell     PONV (postoperative nausea and vomiting)      PVC (premature ventricular contraction) 12/2015 12/2015 Holter - SR (HR ) with PVC's and SVPB's     Past Surgical History:   Procedure Laterality Date     APPENDECTOMY       BIOPSY       COLONOSCOPY  1988    subtotal colectomy without bag     ENT SURGERY  1986    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)     GI SURGERY       HC ABLATION, ENDOMETRIAL, THERMAL, W/O HYSTEROSCOPIC GUIDANCE  07/2010     HEAD & NECK SURGERY       HEPATECTOMY PARTIAL Left 02/16/2016    Procedure: HEPATECTOMY PARTIAL;  Surgeon: Kevin Howard MD;  Location: [facility] OR     HERNIA REPAIR  2014     HYSTERECTOMY, PAP NO LONGER INDICATED  08/2010     LAPAROSCOPIC HEPATECTOMY PARTIAL Left 02/16/2016    Procedure: LAPAROSCOPIC HEPATECTOMY PARTIAL;  Surgeon: Kevin Howard MD;  Location: [facility] OR     LAPAROSCOPY DIAGNOSTIC (GENERAL) N/A 02/16/2016    Procedure: LAPAROSCOPY DIAGNOSTIC (GENERAL);  Surgeon: Kevin Howard MD;  Location: [facility] OR     RECONSTRUCT ABDOMINAL WALL  10/23/2012    Procedure: RECONSTRUCT ABDOMINAL WALL;  ABDOMINAL WALL RECONSTRUCTION WITH MESH ;  Surgeon: Romi Renteria MD;  Location: [facility] OR     SALPINGO OOPHORECTOMY,R/L/FAUSTINA      Salpingo Oophorectomy, RT/LT/FAUSTINA     SURGICAL HISTORY  OF -       partial colectomy (3/4 colon)     [facility] NONSPECIFIC PROCEDURE  1998    hemorrhoidectomy     [facility] NONSPECIFIC PROCEDURE  1984    salivary gland resection rt side     [facility] NONSPECIFIC PROCEDURE  1998    hemorrhoidectomy     [facility] NONSPECIFIC PROCEDURE  2000    sinus surgery     Current Outpatient Medications   Medication Sig Dispense Refill     ADVAIR -21 MCG/ACT inhaler        albuterol (PROVENTIL) (2.5 MG/3ML) 0.083% neb solution Take 1 vial (2.5 mg) by nebulization every 4 hours as needed for shortness of breath or wheezing 90 mL 3     ALBUTEROL 90 MCG/ACT IN AERS 1-2 puffs Q 4-6 hrs prn 1 1 year     CALCIUM + D OR 600mg bid       CENTRUM OR TABS 1 TABLET DAILY       diphenhydrAMINE (BENADRYL) 25 MG capsule Take 25 mg by mouth every 6 hours as needed.       DoxePIN (SINEQUAN) 10 MG capsule Can take 2 three times a day if neaded       EPI E-Z PEN REMEDIOS 1:1000  IJ 1 TIME ONLY 2 3     esomeprazole (NEXIUM) 20 MG DR capsule Take 1 capsule (20 mg) by mouth every morning (before breakfast) Take 30-60 minutes before eating. 90 capsule 0     fexofenadine (ALLEGRA) 180 MG tablet Take 1 tablet (180 mg) by mouth daily 90 tablet 0     IRON RELEASE TBCR 160 MG OR 1 TABLET DAILY       metroNIDAZOLE (METROGEL) 0.75 % vaginal gel Place 1 applicator (5 g) vaginally daily 35 g 11     montelukast (SINGULAIR) 10 MG tablet Take 10 mg by mouth daily        olopatadine (PATANOL) 0.1 % ophthalmic solution Place 2 drops into both eyes daily as needed        Omalizumab (XOLAIR SC) Twice per month       ondansetron (ZOFRAN ODT) 4 MG ODT tab Take 1 tablet (4 mg) by mouth every 6 hours as needed for nausea 20 tablet 11     predniSONE (DELTASONE) 20 MG tablet        Vitamin D, Cholecalciferol, 1000 UNITS TABS Take by mouth daily          Allergies   Allergen Reactions     Toradol Anaphylaxis     Chicken Allergy      hives     Dust Mites      runny, stuffy nose. Gets fungal sinus inf.     Food      FRESH VEGETABLES     Fruit Extracts   "    Cannot eat raw fruits and vegetables but can eat them cooked     Lactose      intolerant     Latex Hives     Mold      same as dust     Onion Hives     Other [No Clinical Screening - See Comments] Hives     BASIL     Oxycodone Difficulty breathing     Relieved with benadryl 50mg     Sunflower Oil Hives     OR SEEDS     Trees      same as dust     Glucosamine Complex [Glucosamine] Palpitations     Aggravates frequent PVC        Social History     Tobacco Use     Smoking status: Never     Smokeless tobacco: Never   Vaping Use     Vaping status: Never Used   Substance Use Topics     Alcohol use: No     Comment: RARE     History   Drug Use No         Objective     /88 (BP Location: Right arm, Patient Position: Sitting, Cuff Size: Adult Large)   Pulse 78   Temp 96.9  F (36.1  C) (Tympanic)   Resp 20   Ht 1.651 m (5' 5\")   Wt 123.3 kg (271 lb 12.8 oz)   LMP 06/04/2010   SpO2 100%   BMI 45.23 kg/m      Physical Exam    GENERAL APPEARANCE: healthy, alert and no distress     EYES: EOMI, PERRL     HENT: ear canals and TM's normal and nose and mouth without ulcers or lesions     NECK: no adenopathy, no asymmetry, masses, or scars and thyroid normal to palpation     RESP: lungs clear to auscultation - no rales, rhonchi or wheezes     CV: regular rates and rhythm, normal S1 S2, no S3 or S4 and no murmur, click or rub     ABDOMEN:  soft, nontender, no HSM or masses and bowel sounds normal     MS: extremities normal- no gross deformities noted, no evidence of inflammation in joints, FROM in all extremities.     SKIN: no suspicious lesions or rashes     NEURO: Normal strength and tone, sensory exam grossly normal, mentation intact and speech normal     PSYCH: mentation appears normal. and affect normal/bright     LYMPHATICS: No cervical adenopathy    Recent Labs   Lab Test 01/18/23  0806 11/03/21  0916   HGB  --  13.0   PLT  --  293    139   POTASSIUM 3.8 4.1   CR 0.71 0.72   A1C 5.8* 5.7*    "     Diagnostics:  Labs pending at this time.  Results will be reviewed when available.  Recent Results (from the past 24 hour(s))   CBC with platelets    Collection Time: 04/13/23 10:42 AM   Result Value Ref Range    WBC Count 7.6 4.0 - 11.0 10e3/uL    RBC Count 4.54 3.80 - 5.20 10e6/uL    Hemoglobin 12.4 11.7 - 15.7 g/dL    Hematocrit 39.0 35.0 - 47.0 %    MCV 86 78 - 100 fL    MCH 27.3 26.5 - 33.0 pg    MCHC 31.8 31.5 - 36.5 g/dL    RDW 14.2 10.0 - 15.0 %    Platelet Count 309 150 - 450 10e3/uL      No EKG required, no history of coronary heart disease, significant arrhythmia, peripheral arterial disease or other structural heart disease.    Revised Cardiac Risk Index (RCRI):  The patient has the following serious cardiovascular risks for perioperative complications:   - No serious cardiac risks = 0 points     RCRI Interpretation: 0 points: Class I (very low risk - 0.4% complication rate)           Signed Electronically by: Nancy Urban MD  Copy of this evaluation report is provided to requesting physician.

## 2023-04-13 NOTE — PATIENT INSTRUCTIONS
Labs today    AM of surgery - asthma meds, doxepin, allegra    No NSAIDS 7 days prior to procedure      For informational purposes only. Not to replace the advice of your health care provider. Copyright   ,  [employer] [facility]. All rights reserved. Clinically reviewed by Ligia Weinberg MD. [place] [id number] - REV .  Preparing for Your Surgery  Getting started  A nurse will call you to review your health history and instructions. They will give you an arrival time based on your scheduled surgery time. Please be ready to share:  Your doctor's clinic name and phone number  Your medical, surgical, and anesthesia history  A list of allergies and sensitivities  A list of medicines, including herbal treatments and over-the-counter drugs  Whether the patient has a legal guardian (ask how to send us the papers in advance)  Please tell us if you're pregnant--or if there's any chance you might be pregnant. Some surgeries may injure a fetus (unborn baby), so they require a pregnancy test. Surgeries that are safe for a fetus don't always need a test, and you can choose whether to have one.   If you have a child who's having surgery, please ask for a copy of Preparing for Your Child's Surgery.    Preparing for surgery  Within 10 to 30 days of surgery: Have a pre-op exam (sometimes called an H&P, or History and Physical). This can be done at a clinic or pre-operative center.  If you're having a , you may not need this exam. Talk to your care team.  At your pre-op exam, talk to your care team about all medicines you take. If you need to stop any medicines before surgery, ask when to start taking them again.  We do this for your safety. Many medicines can make you bleed too much during surgery. Some change how well surgery (anesthesia) drugs work.  Call your insurance company to let them know you're having surgery. (If you don't have insurance, call [phone].)  Call your clinic if there's any change in  your health. This includes signs of a cold or flu (sore throat, runny nose, cough, rash, fever). It also includes a scrape or scratch near the surgery site.  If you have questions on the day of surgery, call your hospital or surgery center.  Eating and drinking guidelines  For your safety: Unless your surgeon tells you otherwise, follow the guidelines below.  Eat and drink as usual until 8 hours before you arrive for surgery. After that, no food or milk.  Drink clear liquids until 2 hours before you arrive. These are liquids you can see through, like water, Gatorade, and Propel Water. They also include plain black coffee and tea (no cream or milk), candy, and breath mints. You can spit out gum when you arrive.  If you drink alcohol: Stop drinking it the night before surgery.  If your care team tells you to take medicine on the morning of surgery, it's okay to take it with a sip of water.  Preventing infection  Shower or bathe the night before and morning of your surgery. Follow the instructions your clinic gave you. (If no instructions, use regular soap.)  Don't shave or clip hair near your surgery site. We'll remove the hair if needed.  Don't smoke or vape the morning of surgery. You may chew nicotine gum up to 2 hours before surgery. A nicotine patch is okay.  Note: Some surgeries require you to completely quit smoking and nicotine. Check with your surgeon.  Your care team will make every effort to keep you safe from infection. We will:  Clean our hands often with soap and water (or an alcohol-based hand rub).  Clean the skin at your surgery site with a special soap that kills germs.  Give you a special gown to keep you warm. (Cold raises the risk of infection.)  Wear special hair covers, masks, gowns and gloves during surgery.  Give antibiotic medicine, if prescribed. Not all surgeries need antibiotics.  What to bring on the day of surgery  Photo ID and insurance card  Copy of your health care directive, if you  have one  Glasses and hearing aids (bring cases)  You can't wear contacts during surgery  Inhaler and eye drops, if you use them (tell us about these when you arrive)  CPAP machine or breathing device, if you use them  A few personal items, if spending the night  If you have . . .  A pacemaker, ICD (cardiac defibrillator) or other implant: Bring the ID card.  An implanted stimulator: Bring the remote control.  A legal guardian: Bring a copy of the certified (court-stamped) guardianship papers.  Please remove any jewelry, including body piercings. Leave jewelry and other valuables at home.  If you're going home the day of surgery  You must have a responsible adult drive you home. They should stay with you overnight as well.  If you don't have someone to stay with you, and you aren't safe to go home alone, we may keep you overnight. Insurance often won't pay for this.  After surgery  If it's hard to control your pain or you need more pain medicine, please call your surgeon's office.  Questions?   If you have any questions for your care team, list them here: _________________________________________________________________________________________________________________________________________________________________________ ____________________________________ ____________________________________ ____________________________________

## 2023-05-01 ENCOUNTER — HOSPITAL ENCOUNTER (OUTPATIENT)
Facility: CLINIC | Age: 63
Discharge: HOME | End: 2023-05-01
Attending: PLASTIC SURGERY | Admitting: PLASTIC SURGERY
Payer: COMMERCIAL

## 2023-05-01 ENCOUNTER — ANESTHESIA EVENT (OUTPATIENT)
Dept: SURGERY | Facility: CLINIC | Age: 63
End: 2023-05-01
Payer: COMMERCIAL

## 2023-05-01 ENCOUNTER — ANESTHESIA (OUTPATIENT)
Dept: SURGERY | Facility: CLINIC | Age: 63
End: 2023-05-01
Payer: COMMERCIAL

## 2023-05-01 VITALS
SYSTOLIC BLOOD PRESSURE: 160 MMHG | TEMPERATURE: 97.5 F | RESPIRATION RATE: 16 BRPM | HEART RATE: 73 BPM | WEIGHT: 273.9 LBS | DIASTOLIC BLOOD PRESSURE: 93 MMHG | OXYGEN SATURATION: 100 % | HEIGHT: 67 IN | BODY MASS INDEX: 42.99 KG/M2

## 2023-05-01 DIAGNOSIS — N62 MACROMASTIA: Primary | ICD-10-CM

## 2023-05-01 PROCEDURE — 710N000012 HC RECOVERY PHASE 2, PER MINUTE: Performed by: PLASTIC SURGERY

## 2023-05-01 PROCEDURE — 250N000013 HC RX MED GY IP 250 OP 250 PS 637: Performed by: PLASTIC SURGERY

## 2023-05-01 PROCEDURE — 250N000011 HC RX IP 250 OP 636: Performed by: NURSE ANESTHETIST, CERTIFIED REGISTERED

## 2023-05-01 PROCEDURE — 250N000025 HC SEVOFLURANE, PER MIN: Performed by: PLASTIC SURGERY

## 2023-05-01 PROCEDURE — 999N000141 HC STATISTIC PRE-PROCEDURE NURSING ASSESSMENT: Performed by: PLASTIC SURGERY

## 2023-05-01 PROCEDURE — 370N000017 HC ANESTHESIA TECHNICAL FEE, PER MIN: Performed by: PLASTIC SURGERY

## 2023-05-01 PROCEDURE — 258N000003 HC RX IP 258 OP 636: Performed by: NURSE ANESTHETIST, CERTIFIED REGISTERED

## 2023-05-01 PROCEDURE — 250N000011 HC RX IP 250 OP 636: Performed by: ANESTHESIOLOGY

## 2023-05-01 PROCEDURE — 360N000076 HC SURGERY LEVEL 3, PER MIN: Performed by: PLASTIC SURGERY

## 2023-05-01 PROCEDURE — 710N000009 HC RECOVERY PHASE 1, LEVEL 1, PER MIN: Performed by: PLASTIC SURGERY

## 2023-05-01 PROCEDURE — 250N000011 HC RX IP 250 OP 636

## 2023-05-01 PROCEDURE — 250N000009 HC RX 250: Performed by: ANESTHESIOLOGY

## 2023-05-01 PROCEDURE — 272N000001 HC OR GENERAL SUPPLY STERILE: Performed by: PLASTIC SURGERY

## 2023-05-01 PROCEDURE — 250N000009 HC RX 250: Performed by: PLASTIC SURGERY

## 2023-05-01 PROCEDURE — 88305 TISSUE EXAM BY PATHOLOGIST: CPT | Mod: 26 | Performed by: PATHOLOGY

## 2023-05-01 PROCEDURE — 250N000013 HC RX MED GY IP 250 OP 250 PS 637

## 2023-05-01 PROCEDURE — 250N000009 HC RX 250: Performed by: NURSE ANESTHETIST, CERTIFIED REGISTERED

## 2023-05-01 PROCEDURE — 88305 TISSUE EXAM BY PATHOLOGIST: CPT | Mod: TC | Performed by: PLASTIC SURGERY

## 2023-05-01 RX ORDER — SCOLOPAMINE TRANSDERMAL SYSTEM 1 MG/1
1 PATCH, EXTENDED RELEASE TRANSDERMAL ONCE
Status: DISCONTINUED | OUTPATIENT
Start: 2023-05-01 | End: 2023-05-01 | Stop reason: HOSPADM

## 2023-05-01 RX ORDER — SODIUM CHLORIDE, SODIUM LACTATE, POTASSIUM CHLORIDE, CALCIUM CHLORIDE 600; 310; 30; 20 MG/100ML; MG/100ML; MG/100ML; MG/100ML
INJECTION, SOLUTION INTRAVENOUS CONTINUOUS
Status: DISCONTINUED | OUTPATIENT
Start: 2023-05-01 | End: 2023-05-01 | Stop reason: HOSPADM

## 2023-05-01 RX ORDER — ONDANSETRON 4 MG/1
4 TABLET, ORALLY DISINTEGRATING ORAL EVERY 30 MIN PRN
Status: DISCONTINUED | OUTPATIENT
Start: 2023-05-01 | End: 2023-05-01 | Stop reason: HOSPADM

## 2023-05-01 RX ORDER — HYDROMORPHONE HCL IN WATER/PF 6 MG/30 ML
0.4 PATIENT CONTROLLED ANALGESIA SYRINGE INTRAVENOUS EVERY 5 MIN PRN
Status: DISCONTINUED | OUTPATIENT
Start: 2023-05-01 | End: 2023-05-01 | Stop reason: HOSPADM

## 2023-05-01 RX ORDER — ONDANSETRON 2 MG/ML
4 INJECTION INTRAMUSCULAR; INTRAVENOUS EVERY 30 MIN PRN
Status: DISCONTINUED | OUTPATIENT
Start: 2023-05-01 | End: 2023-05-01 | Stop reason: HOSPADM

## 2023-05-01 RX ORDER — FENTANYL CITRATE 0.05 MG/ML
25 INJECTION, SOLUTION INTRAMUSCULAR; INTRAVENOUS EVERY 5 MIN PRN
Status: DISCONTINUED | OUTPATIENT
Start: 2023-05-01 | End: 2023-05-01 | Stop reason: HOSPADM

## 2023-05-01 RX ORDER — FENTANYL CITRATE 50 UG/ML
INJECTION, SOLUTION INTRAMUSCULAR; INTRAVENOUS PRN
Status: DISCONTINUED | OUTPATIENT
Start: 2023-05-01 | End: 2023-05-01

## 2023-05-01 RX ORDER — BUPIVACAINE HYDROCHLORIDE AND EPINEPHRINE 2.5; 5 MG/ML; UG/ML
INJECTION, SOLUTION INFILTRATION; PERINEURAL PRN
Status: DISCONTINUED | OUTPATIENT
Start: 2023-05-01 | End: 2023-05-01 | Stop reason: HOSPADM

## 2023-05-01 RX ORDER — PROPOFOL 10 MG/ML
INJECTION, EMULSION INTRAVENOUS CONTINUOUS PRN
Status: DISCONTINUED | OUTPATIENT
Start: 2023-05-01 | End: 2023-05-01

## 2023-05-01 RX ORDER — ONDANSETRON 4 MG/1
4 TABLET, ORALLY DISINTEGRATING ORAL EVERY 8 HOURS PRN
Qty: 10 TABLET | Refills: 0 | Status: SHIPPED | OUTPATIENT
Start: 2023-05-01 | End: 2024-02-14

## 2023-05-01 RX ORDER — GINSENG 100 MG
CAPSULE ORAL PRN
Status: DISCONTINUED | OUTPATIENT
Start: 2023-05-01 | End: 2023-05-01 | Stop reason: HOSPADM

## 2023-05-01 RX ORDER — HYDROCODONE BITARTRATE AND ACETAMINOPHEN 5; 325 MG/1; MG/1
1-2 TABLET ORAL EVERY 4 HOURS PRN
Qty: 30 TABLET | Refills: 0 | Status: SHIPPED | OUTPATIENT
Start: 2023-05-01 | End: 2024-02-14

## 2023-05-01 RX ORDER — ONDANSETRON 4 MG/1
4 TABLET, ORALLY DISINTEGRATING ORAL
Status: DISCONTINUED | OUTPATIENT
Start: 2023-05-01 | End: 2023-05-01 | Stop reason: HOSPADM

## 2023-05-01 RX ORDER — LIDOCAINE HYDROCHLORIDE 20 MG/ML
INJECTION, SOLUTION INFILTRATION; PERINEURAL PRN
Status: DISCONTINUED | OUTPATIENT
Start: 2023-05-01 | End: 2023-05-01

## 2023-05-01 RX ORDER — SODIUM CHLORIDE, SODIUM LACTATE, POTASSIUM CHLORIDE, CALCIUM CHLORIDE 600; 310; 30; 20 MG/100ML; MG/100ML; MG/100ML; MG/100ML
INJECTION, SOLUTION INTRAVENOUS CONTINUOUS PRN
Status: DISCONTINUED | OUTPATIENT
Start: 2023-05-01 | End: 2023-05-01

## 2023-05-01 RX ORDER — HYDROXYZINE HYDROCHLORIDE 25 MG/1
25 TABLET, FILM COATED ORAL
Status: DISCONTINUED | OUTPATIENT
Start: 2023-05-01 | End: 2023-05-01 | Stop reason: HOSPADM

## 2023-05-01 RX ORDER — BUPIVACAINE HYDROCHLORIDE AND EPINEPHRINE 2.5; 5 MG/ML; UG/ML
INJECTION, SOLUTION EPIDURAL; INFILTRATION; INTRACAUDAL; PERINEURAL
Status: DISCONTINUED
Start: 2023-05-01 | End: 2023-05-01 | Stop reason: HOSPADM

## 2023-05-01 RX ORDER — PROPOFOL 10 MG/ML
INJECTION, EMULSION INTRAVENOUS PRN
Status: DISCONTINUED | OUTPATIENT
Start: 2023-05-01 | End: 2023-05-01

## 2023-05-01 RX ORDER — HYDROMORPHONE HCL IN WATER/PF 6 MG/30 ML
0.2 PATIENT CONTROLLED ANALGESIA SYRINGE INTRAVENOUS EVERY 5 MIN PRN
Status: DISCONTINUED | OUTPATIENT
Start: 2023-05-01 | End: 2023-05-01 | Stop reason: HOSPADM

## 2023-05-01 RX ORDER — CEFAZOLIN SODIUM/WATER 3 G/30 ML
3 SYRINGE (ML) INTRAVENOUS SEE ADMIN INSTRUCTIONS
Status: DISCONTINUED | OUTPATIENT
Start: 2023-05-01 | End: 2023-05-01 | Stop reason: HOSPADM

## 2023-05-01 RX ORDER — MAGNESIUM HYDROXIDE 1200 MG/15ML
LIQUID ORAL PRN
Status: DISCONTINUED | OUTPATIENT
Start: 2023-05-01 | End: 2023-05-01 | Stop reason: HOSPADM

## 2023-05-01 RX ORDER — HYDROCODONE BITARTRATE AND ACETAMINOPHEN 5; 325 MG/1; MG/1
1-2 TABLET ORAL
Status: COMPLETED | OUTPATIENT
Start: 2023-05-01 | End: 2023-05-01

## 2023-05-01 RX ORDER — GABAPENTIN 300 MG/1
300 CAPSULE ORAL 3 TIMES DAILY
Status: DISCONTINUED | OUTPATIENT
Start: 2023-05-01 | End: 2023-05-01 | Stop reason: HOSPADM

## 2023-05-01 RX ORDER — CEFAZOLIN SODIUM/WATER 3 G/30 ML
3 SYRINGE (ML) INTRAVENOUS
Status: COMPLETED | OUTPATIENT
Start: 2023-05-01 | End: 2023-05-01

## 2023-05-01 RX ORDER — DEXMEDETOMIDINE HYDROCHLORIDE 4 UG/ML
INJECTION, SOLUTION INTRAVENOUS PRN
Status: DISCONTINUED | OUTPATIENT
Start: 2023-05-01 | End: 2023-05-01

## 2023-05-01 RX ORDER — ONDANSETRON 2 MG/ML
INJECTION INTRAMUSCULAR; INTRAVENOUS PRN
Status: DISCONTINUED | OUTPATIENT
Start: 2023-05-01 | End: 2023-05-01

## 2023-05-01 RX ORDER — FENTANYL CITRATE 0.05 MG/ML
50 INJECTION, SOLUTION INTRAMUSCULAR; INTRAVENOUS EVERY 5 MIN PRN
Status: DISCONTINUED | OUTPATIENT
Start: 2023-05-01 | End: 2023-05-01 | Stop reason: HOSPADM

## 2023-05-01 RX ADMIN — PHENYLEPHRINE HYDROCHLORIDE 100 MCG: 10 INJECTION INTRAVENOUS at 08:55

## 2023-05-01 RX ADMIN — MIDAZOLAM 2 MG: 1 INJECTION INTRAMUSCULAR; INTRAVENOUS at 07:37

## 2023-05-01 RX ADMIN — DEXMEDETOMIDINE HYDROCHLORIDE 8 MCG: 200 INJECTION INTRAVENOUS at 09:05

## 2023-05-01 RX ADMIN — HYDROCODONE BITARTRATE AND ACETAMINOPHEN 1 TABLET: 5; 325 TABLET ORAL at 11:28

## 2023-05-01 RX ADMIN — Medication 3 G: at 07:37

## 2023-05-01 RX ADMIN — PROPOFOL 30 MCG/KG/MIN: 10 INJECTION, EMULSION INTRAVENOUS at 07:45

## 2023-05-01 RX ADMIN — LIDOCAINE HYDROCHLORIDE 100 MG: 20 INJECTION, SOLUTION INFILTRATION; PERINEURAL at 07:42

## 2023-05-01 RX ADMIN — ONDANSETRON 4 MG: 2 INJECTION INTRAMUSCULAR; INTRAVENOUS at 12:12

## 2023-05-01 RX ADMIN — SODIUM CHLORIDE, POTASSIUM CHLORIDE, SODIUM LACTATE AND CALCIUM CHLORIDE: 600; 310; 30; 20 INJECTION, SOLUTION INTRAVENOUS at 07:36

## 2023-05-01 RX ADMIN — FENTANYL CITRATE 50 MCG: 50 INJECTION, SOLUTION INTRAMUSCULAR; INTRAVENOUS at 11:29

## 2023-05-01 RX ADMIN — HYDROMORPHONE HYDROCHLORIDE 0.5 MG: 1 INJECTION, SOLUTION INTRAMUSCULAR; INTRAVENOUS; SUBCUTANEOUS at 08:07

## 2023-05-01 RX ADMIN — PHENYLEPHRINE HYDROCHLORIDE 100 MCG: 10 INJECTION INTRAVENOUS at 09:13

## 2023-05-01 RX ADMIN — FENTANYL CITRATE 25 MCG: 50 INJECTION, SOLUTION INTRAMUSCULAR; INTRAVENOUS at 11:44

## 2023-05-01 RX ADMIN — ONDANSETRON 4 MG: 2 INJECTION INTRAMUSCULAR; INTRAVENOUS at 10:04

## 2023-05-01 RX ADMIN — DEXMEDETOMIDINE HYDROCHLORIDE 12 MCG: 200 INJECTION INTRAVENOUS at 07:42

## 2023-05-01 RX ADMIN — SODIUM CHLORIDE, POTASSIUM CHLORIDE, SODIUM LACTATE AND CALCIUM CHLORIDE: 600; 310; 30; 20 INJECTION, SOLUTION INTRAVENOUS at 09:03

## 2023-05-01 RX ADMIN — PROPOFOL 250 MG: 10 INJECTION, EMULSION INTRAVENOUS at 07:42

## 2023-05-01 RX ADMIN — GABAPENTIN 300 MG: 300 CAPSULE ORAL at 06:38

## 2023-05-01 RX ADMIN — SCOPALAMINE 1 PATCH: 1 PATCH, EXTENDED RELEASE TRANSDERMAL at 06:41

## 2023-05-01 RX ADMIN — TRANEXAMIC ACID 1 G: 1 INJECTION, SOLUTION INTRAVENOUS at 08:47

## 2023-05-01 RX ADMIN — FENTANYL CITRATE 100 MCG: 50 INJECTION, SOLUTION INTRAMUSCULAR; INTRAVENOUS at 07:42

## 2023-05-01 ASSESSMENT — ACTIVITIES OF DAILY LIVING (ADL)
ADLS_ACUITY_SCORE: 35

## 2023-05-01 NOTE — DISCHARGE INSTRUCTIONS
Same Day Surgery Discharge Instructions for  Sedation and General Anesthesia     It's not unusual to feel dizzy, light-headed or faint for up to 24 hours after surgery or while taking pain medication.  If you have these symptoms: sit for a few minutes before standing and have someone assist you when you get up to walk or use the bathroom.    You should rest and relax for the next 24 hours. We recommend you make arrangements to have an adult stay with you for at least 24 hours after your discharge.  Avoid hazardous and strenuous activity.    DO NOT DRIVE any vehicle or operate mechanical equipment for 24 hours following the end of your surgery.  Even though you may feel normal, your reactions may be affected by the medication you have received.    Do not drink alcoholic beverages for 24 hours following surgery.     Slowly progress to your regular diet as you feel able. It's not unusual to feel nauseated and/or vomit after receiving anesthesia.  If you develop these symptoms, drink clear liquids (apple juice, ginger ale, broth, 7-up, etc. ) until you feel better.  If your nausea and vomiting persists for 24 hours, please notify your surgeon.      All narcotic pain medications, along with inactivity and anesthesia, can cause constipation. Drinking plenty of liquids and increasing fiber intake will help.    For any questions of a medical nature, call your surgeon.    Do not make important decisions for 24 hours.    If you had general anesthesia, you may have a sore throat for a couple of days related to the breathing tube used during surgery.  You may use Cepacol lozenges to help with this discomfort.  If it worsens or if you develop a fever, contact your surgeon.     If you feel your pain is not well managed with the pain medications prescribed by your surgeon, please contact your surgeon's office to let them know so they can address your concerns.     Mitchell Bell Drain  Home Care Instructions    What is a Mitchell  Arabella (RAFAEL) Drain?  This is a small tube that connects to a bulb.  Its gentle suction removes extra fluid from a surgical wound.  Your doctor will remove the tube when the amount of fluid decreases.  The color and amount of fluid varies.  Right after surgery the fluid is bright red.  Over time, it changes to light pink and may become clear or the color of straw.    How should I care for my tube site?  Keep the skin around the tube dry.  Check with your doctor about how to shower.  You may need to cover the site with plastic when you shower.  Or, it may be okay to let the site get wet and put on a clean bandage after you shower.    If the bandage gets wet, you will need to change it.  How should I care for the bulb?  Keep the bulb compressed at all times except while you empty it.   Attach the bulb to your clothing with tape and a safety pin.  Try to empty the bulb at the same time every day.  Empty the bulb at least once a day, or when the bulb becomes half full.  If it becomes too full, there will not be enough suction.    To empty the bulb:  Wash your hands.  Open the bulb cap.  Drain the fluid from the bulb into the measuring cup.  If you have two drains, use two cups.    Clean the mouth of the bulb with an alcohol wipe if your nurse told you to.  Squeeze the bulb (fold it in half before you close the bulb cap) If it does not stay compressed, call your nurse or clinic.  Write the amount of drainage on the drainage record (see back page).  If you have two drains, write the amount for each bulb.  Flush the drainage down the toilet.  Rinse the measuring cup.  Wash your hands.    When should I call my doctor?   Call your doctor if:  You have a fever over 101 F (38.3 C), taken under the tongue.   The drainage increases or smells bad.  The skin around your tube has increased redness, swelling, warmth or pain.  You have pus or fluid leaking at the tube site.  Your stitches break.  You think the tube is not draining.  The  tube falls out.  You have any problems or concerns.    Your drainage record:    Empty your bulb at least once per day or when 1/2 to 1/3 full.  Write down the date, time and amount of drainage in each bulb.   Bring this record to each clinic visit.    Date Time Bulb 1: amount of  Drainage in (ml or cc) Bulb 2: amount of drainage (in ml or cc) Notes                                                       **If you have questions or concerns about your procedure,   call Dr. Garcia at [phone]**

## 2023-05-01 NOTE — OR NURSING
O2 sats as low as 77% on RA while asleep initially in Phase II.  Using incentive spirometer up to 2000.  When awake and talking, sats as high as 100%.  Pt and  her  deny any symptoms of sleep apnea at home.  She states she has had similar problems with low O2 sats after previous surgeries.  Dr Garcia consulted and recommended pt continue to be monitored.  Pt now states she feels fully awake.  Ambulated to bathroom with stand-by assist.  Able to maintain O2 sats above 90%,mostly %, with occasional dip to high 80s.  Pt motivated to to go home.   Pt and  instructed to continue using IS at least every hour while awake and sleep with head elevated.  Discharge okayed by Dr. Garcia.

## 2023-05-01 NOTE — ANESTHESIA POSTPROCEDURE EVALUATION
Patient: Stephanie Mendez    Procedure: Procedure(s):  BILATERAL REDUCTION MAMMOPLASTY, WITH FREE NIPPLE GRAFTS       Anesthesia Type:  General    Note:  Disposition: Outpatient   Postop Pain Control: Uneventful            Sign Out: Well controlled pain   PONV: No   Neuro/Psych: Uneventful            Sign Out: Acceptable/Baseline neuro status   Airway/Respiratory: Uneventful            Sign Out: Acceptable/Baseline resp. status   CV/Hemodynamics: Uneventful            Sign Out: Acceptable CV status; No obvious hypovolemia; No obvious fluid overload   Other NRE: NONE   DID A NON-ROUTINE EVENT OCCUR? No           Last vitals:  Vitals Value Taken Time   /103 05/01/23 1215   Temp 36.1  C (97  F) 05/01/23 1046   Pulse 79 05/01/23 1225   Resp 15 05/01/23 1225   SpO2 96 % 05/01/23 1225   Vitals shown include unvalidated device data.    Electronically Signed By: Chidi Garcia MD  May 1, 2023  2:37 PM

## 2023-05-01 NOTE — ANESTHESIA PREPROCEDURE EVALUATION
Prior to Admission medications    Medication Sig Start Date End Date Taking? Authorizing Provider   ADVAIR -21 MCG/ACT inhaler  8/17/21  Yes Reported, Patient   ALBUTEROL 90 MCG/ACT IN AERS 1-2 puffs Q 4-6 hrs prn 12/3/07  Yes Ludmila Allen MD   CALCIUM + D OR 600mg bid   Yes Reported, Patient   CENTRUM OR TABS 1 TABLET DAILY   Yes    diphenhydrAMINE (BENADRYL) 25 MG capsule Take 25 mg by mouth every 6 hours as needed.   Yes Reported, Patient   DoxePIN (SINEQUAN) 10 MG capsule Can take 2 three times a day if neaded 1/28/13  Yes Nancy Urban MD   EPI E-Z PEN REMEDIOS 1:1000  IJ 1 TIME ONLY 12/3/07  Yes Ludmila Allen MD   esomeprazole (NEXIUM) 20 MG DR capsule Take 1 capsule (20 mg) by mouth every morning (before breakfast) Take 30-60 minutes before eating. 11/3/21  Yes Nancy Urban MD   fexofenadine (ALLEGRA) 180 MG tablet Take 1 tablet (180 mg) by mouth daily 11/3/21  Yes Nancy Urban MD   IRON RELEASE TBCR 160 MG OR 1 TABLET DAILY   Yes    montelukast (SINGULAIR) 10 MG tablet Take 10 mg by mouth daily  12/3/07  Yes Ludmila Allen MD   ondansetron (ZOFRAN ODT) 4 MG ODT tab Take 1 tablet (4 mg) by mouth every 6 hours as needed for nausea 1/18/23  Yes Nancy Urban MD   predniSONE (DELTASONE) 20 MG tablet  8/17/21  Yes Reported, Patient   Vitamin D, Cholecalciferol, 1000 UNITS TABS Take by mouth daily    Yes Reported, Patient   albuterol (PROVENTIL) (2.5 MG/3ML) 0.083% neb solution Take 1 vial (2.5 mg) by nebulization every 4 hours as needed for shortness of breath or wheezing 1/18/23   Nancy Urban MD   metroNIDAZOLE (METROGEL) 0.75 % vaginal gel Place 1 applicator (5 g) vaginally daily 1/18/23   Nancy Urban MD   olopatadine (PATANOL) 0.1 % ophthalmic solution Place 2 drops into both eyes daily as needed  8/7/06   Dora Davis MD   Omalizumab (XOLAIR SC) Twice per month    Reported, Patient     Current Facility-Administered Medications  Ordered in Epic   Medication Dose Route Frequency Last Rate Last Admin     ceFAZolin Sodium (ANCEF) injection 3 g  3 g Intravenous Pre-Op/Pre-procedure x 1 dose         ceFAZolin Sodium (ANCEF) injection 3 g  3 g Intravenous See Admin Instructions         gabapentin (NEURONTIN) capsule 300 mg  300 mg Oral TID   300 mg at 23 0638     scopolamine (TRANSDERM) 72 hr patch 1 patch  1 patch Transdermal Once   1 patch at 23 0641     No current [facility]-ordered outpatient medications on file.       Recent Labs   Lab Test 16  1535   ABO A   RH  Pos     No results for input(s): HCG in the last 92023 hours.  No results found for this or any previous visit (from the past 744 hour(s)).Anesthesia Pre-Procedure Evaluation    Patient: Stephanie Mendez   MRN: [id number] : 1960        Procedure : Procedure(s):  BILATERAL REDUCTION MAMMOPLASTY, WITH FREE NIPPLE GRAFTS          Past Medical History:   Diagnosis Date     Allergic rhinitis, cause unspecified      Allergy, food      Anemia, unspecified      Arthritis 2014    toe and 2016 slight in knee     Cancer (H) 2005    basil cell carsinoma     Chronic idiopathic urticaria      Chronic maxillary sinusitis     recurrent fungal infection (remote)     DEPRESSION      Esophageal reflux      Irregular heart beat      Mild intermittent asthma      Murmur, heart     2015 3/6 systolic murmur heard - echo ordered     Personal history of other malignant neoplasm of skin     Basal cell     PONV (postoperative nausea and vomiting)      PVC (premature ventricular contraction) 2015 Holter - SR (HR ) with PVC's and SVPB's      Past Surgical History:   Procedure Laterality Date     APPENDECTOMY       BIOPSY       COLONOSCOPY      subtotal colectomy without bag     ENT SURGERY      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)     GI SURGERY       HC ABLATION, ENDOMETRIAL, THERMAL, W/O HYSTEROSCOPIC GUIDANCE  2010     HEAD & NECK  SURGERY       HEPATECTOMY PARTIAL Left 02/16/2016    Procedure: HEPATECTOMY PARTIAL;  Surgeon: Kevin Howard MD;  Location: [facility] OR     HERNIA REPAIR  2014     HYSTERECTOMY, PAP NO LONGER INDICATED  08/2010     LAPAROSCOPIC HEPATECTOMY PARTIAL Left 02/16/2016    Procedure: LAPAROSCOPIC HEPATECTOMY PARTIAL;  Surgeon: Kevin Howard MD;  Location: [facility] OR     LAPAROSCOPY DIAGNOSTIC (GENERAL) N/A 02/16/2016    Procedure: LAPAROSCOPY DIAGNOSTIC (GENERAL);  Surgeon: Kevin Howard MD;  Location: [facility] OR     RECONSTRUCT ABDOMINAL WALL  10/23/2012    Procedure: RECONSTRUCT ABDOMINAL WALL;  ABDOMINAL WALL RECONSTRUCTION WITH MESH ;  Surgeon: Romi Renteria MD;  Location: [facility] OR     SALPINGO OOPHORECTOMY,R/L/FAUSTINA      Salpingo Oophorectomy, RT/LT/FAUSTINA     SURGICAL HISTORY OF -       partial colectomy (3/4 colon)     [facility] NONSPECIFIC PROCEDURE  1998    hemorrhoidectomy     [facility] NONSPECIFIC PROCEDURE  1984    salivary gland resection rt side     [facility] NONSPECIFIC PROCEDURE  1998    hemorrhoidectomy     [facility] NONSPECIFIC PROCEDURE  2000    sinus surgery      Allergies   Allergen Reactions     Ketorolac Tromethamine Anaphylaxis     Steri Strips Hives and Blisters     Had hives/large welts underneath strips      Chicken Allergy      hives     Dust Mites      runny, stuffy nose. Gets fungal sinus inf.     Food      FRESH VEGETABLES     Fruit Extracts      Cannot eat raw fruits and vegetables but can eat them cooked     Lactose Intolerance (Gi)      intolerant     Latex Hives     Mold      same as dust     Onion Hives     Other [No Clinical Screening - See Comments] Hives     BASIL     Oxycodone Difficulty breathing     Relieved with benadryl 50mg     Sunflower Oil Hives     OR SEEDS     Trees      same as dust     Glucosamine Complex [Glucosamine] Palpitations     Aggravates frequent PVC      Social History     Tobacco Use     Smoking status: Never     Smokeless tobacco: Never   Vaping Use     Vaping status: Never Used   Substance Use Topics      Alcohol use: No     Comment: RARE      Wt Readings from Last 1 Encounters:   05/01/23 124.2 kg (273 lb 14.4 oz)        Anesthesia Evaluation   Pt has had prior anesthetic.     History of anesthetic complications  - PONV.      ROS/MED HX  ENT/Pulmonary:     (+) Intermittent, asthma Treatment: Inhaler prn,      Neurologic:       Cardiovascular:     (+) -----Irregular Heartbeat/Palpitations,     METS/Exercise Tolerance:     Hematologic:       Musculoskeletal:       GI/Hepatic: Comment: Liver cyst removed 2016    (+) GERD, Asymptomatic on medication, liver disease,     Renal/Genitourinary:       Endo:     (+) Obesity,     Psychiatric/Substance Use:       Infectious Disease:       Malignancy:       Other:            Physical Exam    Airway        Mallampati: I    Neck ROM: full     Respiratory Devices and Support         Dental       (+) Modest Abnormalities - crowns, retainers, 1 or 2 missing teeth      Cardiovascular   cardiovascular exam normal          Pulmonary   pulmonary exam normal                OUTSIDE LABS:  CBC:   Lab Results   Component Value Date    WBC 7.6 04/13/2023    WBC 7.2 11/03/2021    HGB 12.4 04/13/2023    HGB 13.0 11/03/2021    HCT 39.0 04/13/2023    HCT 40.3 11/03/2021     04/13/2023     11/03/2021     BMP:   Lab Results   Component Value Date     04/13/2023     01/18/2023    POTASSIUM 4.4 04/13/2023    POTASSIUM 3.8 01/18/2023    CHLORIDE 105 04/13/2023    CHLORIDE 104 01/18/2023    CO2 23 04/13/2023    CO2 26 01/18/2023    BUN 15.6 04/13/2023    BUN 17.3 01/18/2023    CR 0.72 04/13/2023    CR 0.71 01/18/2023    GLC 88 04/13/2023    GLC 99 01/18/2023     COAGS:   Lab Results   Component Value Date    INR 1.17 (H) 02/20/2016     POC:   Lab Results   Component Value Date    BGM 98 02/16/2016    HCG Negative 07/09/2010     HEPATIC:   Lab Results   Component Value Date    ALBUMIN 4.2 01/18/2023    PROTTOTAL 7.3 01/18/2023    ALT 20 01/18/2023    AST 20 01/18/2023     ALKPHOS 89 01/18/2023    BILITOTAL 0.5 01/18/2023     OTHER:   Lab Results   Component Value Date    PH 7.38 02/17/2016    LACT 3.1 (H) 02/16/2016    LACT 3.1 (H) 02/16/2016    A1C 5.8 (H) 01/18/2023    VONDA 9.8 04/13/2023    MAG 2.2 12/15/2015    TSH 3.63 01/18/2023    T4 1.21 09/29/2008    T3 176 09/29/2008    CRP 27.7 (H) 09/29/2008    SED 24 09/29/2008       Anesthesia Plan    ASA Status:  3   NPO Status:  NPO Appropriate    Anesthesia Type: General.     - Airway: LMA   Induction: Intravenous.   Maintenance: Balanced.        Consents    Anesthesia Plan(s) and associated risks, benefits, and realistic alternatives discussed. Questions answered and patient/representative(s) expressed understanding.    - Discussed:     - Discussed with:  Patient         Postoperative Care    Pain management: IV analgesics.   PONV prophylaxis: Ondansetron (or other 5HT-3), Scopolamine patch     Comments:                Chidi Garcia MD

## 2023-05-01 NOTE — ANESTHESIA CARE TRANSFER NOTE
Patient: Stephanie Mendez    Procedure: Procedure(s):  BILATERAL REDUCTION MAMMOPLASTY, WITH FREE NIPPLE GRAFTS       Diagnosis: Macromastia [N62]  Diagnosis Additional Information: No value filed.    Anesthesia Type:   General     Note:    Oropharynx: oropharynx clear of all foreign objects  Level of Consciousness: drowsy  Oxygen Supplementation: face mask  Level of Supplemental Oxygen (L/min / FiO2): 6  Independent Airway: airway patency satisfactory and stable  Dentition: dentition unchanged  Vital Signs Stable: post-procedure vital signs reviewed and stable  Report to RN Given: handoff report given  Patient transferred to: PACU  Comments: To PACU; Arouses to nichole, good airway, 02 face mask, VSS  Report to RN  Handoff Report: Identifed the Patient, Identified the Reponsible Provider, Reviewed the pertinent medical history, Discussed the surgical course, Reviewed Intra-OP anesthesia mangement and issues during anesthesia, Set expectations for post-procedure period and Allowed opportunity for questions and acknowledgement of understanding      Vitals:  Vitals Value Taken Time   /97 05/01/23 1046   Temp     Pulse 91 05/01/23 1048   Resp 19 05/01/23 1048   SpO2 99 % 05/01/23 1048   Vitals shown include unvalidated device data.    Electronically Signed By: LEWIS Drew CRNA  May 1, 2023  10:50 AM

## 2023-05-01 NOTE — OP NOTE
Date of Service: 05/01/23    PREOPERATIVE DIAGNOSES:      Symptomatic macromastia   Cervicalgia   Bilateral shoulder pain    POSTOPERATIVE DIAGNOSES:    same    PROCEDURES:     Bilateral reduction mammaplasty    SURGEON: Ottoniel Garcia MD     ANESTHESIA: General endotracheal anesthesia    COMPLICATIONS: None.     ESTIMATED BLOOD LOSS: 100 cc    DISPOSITION: To the Post Anesthesia Care Unit in stable condition.     TUBES AND DRAINS: jorge x 2    COUNTS: Correct     SPECIMENS: breast tissue to pathology 1895g right ; 2000 g left    INDICATIONS:      This is a 63 yo female who presents for bilateral reduction mammaplasty.  We discussed details, risks, benefits, and alternatives of the procedure.  Because of the size and length of her breasts, she will require free nipple grafts. She understands this requires significantly longer healing and will result in less projection, numbness, pigmentation changes and loss of nipple function.  She understands limitations and risks including bleeding, hematoma, seroma that could require surgical evacuation. She understands there could be a need for unplanned surgical revision. We discussed there is no guarantee of cure or protection from benign or malignant breast or skin disease.   We discussed that there can be asymmetry, partial or total necrosis of the nipple-areola complex, skin necrosis, fat necrosis, numbness or hypersensitivity that may include the nipple-areola complex, scarring which may be hypertrophic, keloid or non-cosmetic.  She understands there may be difficulty breast feeding in the future and that I cannot guarantee a cup size.  Symptom resolution may be incomplete.  She voiced understanding, signed consent, elected to proceed.    DESCRIPTION OF PROCEDURE:      She was marked preoperatively in the upright position.  A modified Bartlett-pattern was designed with a superio-medial pedicle.   The new nipple position was marked at the projection of the inframammary  fold which measured 25 cm from the sternal notch and vertical limbs were planned 10 cm long.  She was taken to the operating room and placed supine.  Pressure points were padded and sequential compression devices were placed.  She was given general anesthesia and IV antibiotics by the anesthesia staff.  She was prepped in the standard fashion and a timeout was performed to ensure the correct orientation and procedure.    I began on the left side.  The areola was marked under stretch at a 42 mm diameter.   I injected my proposed incisions with 1/4 % marcaine with epinephrine.  I scored my incisions and de-epithelialized a superio-medial pedicle of breast tissue.  I removed the nipple areolar complex as a graft and placed it in saline soaked gauze.  I incised down towards the chest wall without exposing the pectoralis fascia to mobilize the pedicle.  I then excised the excess breast tissue within my markings.  The tissue weighed 2000 g and was sent for permanent sectioning.      The breast pedicle was rotated suuperiorly and vertical pillars were re-approximated with interrupted 2-0 PDS.  Hemostasis was ensured and the pocket was irrigated with normal saline.   A 15-Turkmen round channel drain was placed and secured laterally with a 2-0 silk.  The vertical and horizontal skin closures were completed with interrupted 3-0 monocryl,  Insorb staples and running 3-0 Stratafix suture.  The areola was inset into a 38 mm diameter de-epithelialized opening centered 9 cm above the inframammary fold and inset was done with running 5-0 fast absorbing plain gut suture.    I turned my attention to the right side.  The areola was marked under stretch at a 42 mm diameter.   I injected my proposed incisions with 1/4 % marcaine with epinephrine.  I scored my incisions and de-epithelialized a superio-medial pedicle of breast tissue.  I removed the nipple areolar complex as a graft and placed it in saline soaked gauze.  I incised down  towards the chest wall without exposing the pectoralis fascia to mobilize the pedicle.  I then excised the excess breast tissue within my markings.  The tissue weighed 1895 g and was sent for permanent sectioning.      The breast pedicle was rotated suuperiorly and vertical pillars were re-approximated with interrupted 2-0 PDS.  Hemostasis was ensured and the pocket was irrigated with normal saline.   A 15-Persian round channel drain was placed and secured laterally with a 2-0 silk.  The vertical and horizontal skin closures were completed with interrupted 3-0 monocryl,  Insorb staples and running 3-0 Stratafix suture.  The areola was inset into a 38 mm diameter de-epithelialized opening centered 9 cm above the inframammary fold and inset was done with running 5-0 fast absorbing plain gut suture.    Good shape and symmetry was achieved with the patient in the upright position.  Xeroform was applied to the incisions. Bacitracin and xeroform was applied to the free nipple grafts and secured with 3-0 silk sutures.  Sterile Kerlix gauze was placed over the breasts.  She was wrapped comfortably with a double 6- inch Ace bandage, awoken from anesthesia, and taken to the PACU in stable condition.

## 2023-05-02 LAB
PATH REPORT.COMMENTS IMP SPEC: NORMAL
PATH REPORT.COMMENTS IMP SPEC: NORMAL
PATH REPORT.FINAL DX SPEC: NORMAL
PATH REPORT.GROSS SPEC: NORMAL
PATH REPORT.MICROSCOPIC SPEC OTHER STN: NORMAL
PATH REPORT.RELEVANT HX SPEC: NORMAL
PHOTO IMAGE: NORMAL

## 2023-05-25 ENCOUNTER — TRANSFERRED RECORDS (OUTPATIENT)
Dept: HEALTH INFORMATION MANAGEMENT | Facility: CLINIC | Age: 63
End: 2023-05-25
Payer: COMMERCIAL

## 2023-11-17 ENCOUNTER — MYC MEDICAL ADVICE (OUTPATIENT)
Dept: PEDIATRICS | Facility: CLINIC | Age: 63
End: 2023-11-17
Payer: COMMERCIAL

## 2023-11-17 NOTE — TELEPHONE ENCOUNTER
Patient Quality Outreach Health Maintenance - PAL RN    Summary:    PAL RN contacted pt regarding overdue health maintenance    Patient is due/failing the following:   Asthma  -  ACT needed      Topic Date Due    Pneumococcal Vaccine (1 - PCV) Never done    Zoster (Shingles) Vaccine (1 of 2) Never done    Flu Vaccine (1) 09/01/2023    COVID-19 Vaccine (3 - 2023-24 season) 09/01/2023       Health Maintenance Due   Topic Date Due    DEXA  Never done    Pneumococcal Vaccine: 65+ Years (1 - PCV) Never done    ZOSTER IMMUNIZATION (1 of 2) Never done    RSV VACCINE (Pregnancy & 60+) (1 - 1-dose 60+ series) Never done    FALL RISK ASSESSMENT  Never done    INFLUENZA VACCINE (1) 09/01/2023    COVID-19 Vaccine (3 - 2023-24 season) 09/01/2023    ASTHMA CONTROL TEST  10/13/2023       Type of outreach:    Sent [employer] message.

## 2024-02-01 ENCOUNTER — NURSE TRIAGE (OUTPATIENT)
Dept: PEDIATRICS | Facility: CLINIC | Age: 64
End: 2024-02-01

## 2024-02-01 NOTE — TELEPHONE ENCOUNTER
Patient left a voicemail on my line reporting left side abdominal pain, dysuria.   Has an appointment today at 11:30am.     Called back.   States yesterday she started with left side abdominal pain, moderate.   Did vomit a few times yesterday due to the abdominal cramping.   Has not had a bowel movement for the past two days.   Has not been passing flatulence.     I huddled with Dr. Urban.   If not passing flatulence would be better to be assessed through the ED.   If passing gas, then ok to be seen here for a visit.     Updated patient.   Explained process of clinic and ED in the setting of a potential bowel obstruction.   Patient did feel more comfortable coming to the clinic. She will consider ED and cancel if needed.   She does agree if her symptoms change or worsens she will go to the ER.

## 2024-02-01 NOTE — TELEPHONE ENCOUNTER
Patient called back.   She went to the [facility].   Diagnosis of kidney stone. Was entering her bladder so she should pass it soon.   Patient will follow up with Dr. Urban on 2/14/2024.   Patient will follow up sooner if needed.

## 2024-02-07 ENCOUNTER — ANCILLARY PROCEDURE (OUTPATIENT)
Dept: MAMMOGRAPHY | Facility: CLINIC | Age: 64
End: 2024-02-07
Attending: PEDIATRICS
Payer: COMMERCIAL

## 2024-02-07 DIAGNOSIS — Z12.31 VISIT FOR SCREENING MAMMOGRAM: ICD-10-CM

## 2024-02-07 PROCEDURE — 77067 SCR MAMMO BI INCL CAD: CPT | Mod: TC | Performed by: RADIOLOGY

## 2024-02-07 PROCEDURE — 77063 BREAST TOMOSYNTHESIS BI: CPT | Mod: TC | Performed by: RADIOLOGY

## 2024-02-12 SDOH — HEALTH STABILITY: PHYSICAL HEALTH: ON AVERAGE, HOW MANY MINUTES DO YOU ENGAGE IN EXERCISE AT THIS LEVEL?: 20 MIN

## 2024-02-12 SDOH — HEALTH STABILITY: PHYSICAL HEALTH: ON AVERAGE, HOW MANY DAYS PER WEEK DO YOU ENGAGE IN MODERATE TO STRENUOUS EXERCISE (LIKE A BRISK WALK)?: 3 DAYS

## 2024-02-12 ASSESSMENT — ASTHMA QUESTIONNAIRES
QUESTION_5 LAST FOUR WEEKS HOW WOULD YOU RATE YOUR ASTHMA CONTROL: COMPLETELY CONTROLLED
QUESTION_2 LAST FOUR WEEKS HOW OFTEN HAVE YOU HAD SHORTNESS OF BREATH: NOT AT ALL
QUESTION_1 LAST FOUR WEEKS HOW MUCH OF THE TIME DID YOUR ASTHMA KEEP YOU FROM GETTING AS MUCH DONE AT WORK, SCHOOL OR AT HOME: NONE OF THE TIME
ACT_TOTALSCORE: 25
ACT_TOTALSCORE: 25
QUESTION_3 LAST FOUR WEEKS HOW OFTEN DID YOUR ASTHMA SYMPTOMS (WHEEZING, COUGHING, SHORTNESS OF BREATH, CHEST TIGHTNESS OR PAIN) WAKE YOU UP AT NIGHT OR EARLIER THAN USUAL IN THE MORNING: NOT AT ALL
QUESTION_4 LAST FOUR WEEKS HOW OFTEN HAVE YOU USED YOUR RESCUE INHALER OR NEBULIZER MEDICATION (SUCH AS ALBUTEROL): NOT AT ALL

## 2024-02-12 ASSESSMENT — SOCIAL DETERMINANTS OF HEALTH (SDOH): HOW OFTEN DO YOU GET TOGETHER WITH FRIENDS OR RELATIVES?: MORE THAN THREE TIMES A WEEK

## 2024-02-14 ENCOUNTER — OFFICE VISIT (OUTPATIENT)
Dept: PEDIATRICS | Facility: CLINIC | Age: 64
End: 2024-02-14
Payer: COMMERCIAL

## 2024-02-14 VITALS
SYSTOLIC BLOOD PRESSURE: 156 MMHG | DIASTOLIC BLOOD PRESSURE: 70 MMHG | BODY MASS INDEX: 43.95 KG/M2 | TEMPERATURE: 97 F | OXYGEN SATURATION: 98 % | WEIGHT: 263.8 LBS | HEART RATE: 82 BPM | HEIGHT: 65 IN | RESPIRATION RATE: 16 BRPM

## 2024-02-14 DIAGNOSIS — J30.9 ALLERGIC RHINITIS, UNSPECIFIED SEASONALITY, UNSPECIFIED TRIGGER: ICD-10-CM

## 2024-02-14 DIAGNOSIS — R01.1 HEART MURMUR: ICD-10-CM

## 2024-02-14 DIAGNOSIS — Z90.49 S/P PARTIAL COLECTOMY: ICD-10-CM

## 2024-02-14 DIAGNOSIS — J45.30 MILD PERSISTENT ASTHMA WITHOUT COMPLICATION: ICD-10-CM

## 2024-02-14 DIAGNOSIS — L50.1 CHRONIC IDIOPATHIC URTICARIA: ICD-10-CM

## 2024-02-14 DIAGNOSIS — K21.9 GASTROESOPHAGEAL REFLUX DISEASE, UNSPECIFIED WHETHER ESOPHAGITIS PRESENT: ICD-10-CM

## 2024-02-14 DIAGNOSIS — R11.0 NAUSEA: ICD-10-CM

## 2024-02-14 DIAGNOSIS — T78.3XXS ANGIOEDEMA, SEQUELA: ICD-10-CM

## 2024-02-14 DIAGNOSIS — Z78.0 ASYMPTOMATIC POSTMENOPAUSAL STATUS: ICD-10-CM

## 2024-02-14 DIAGNOSIS — Z88.9 MULTIPLE DRUG ALLERGIES: ICD-10-CM

## 2024-02-14 DIAGNOSIS — Z00.00 ROUTINE HISTORY AND PHYSICAL EXAMINATION OF ADULT: Primary | ICD-10-CM

## 2024-02-14 DIAGNOSIS — N76.0 BACTERIAL VAGINOSIS: ICD-10-CM

## 2024-02-14 DIAGNOSIS — B96.89 BACTERIAL VAGINOSIS: ICD-10-CM

## 2024-02-14 DIAGNOSIS — N20.0 KIDNEY STONE: ICD-10-CM

## 2024-02-14 DIAGNOSIS — R73.01 IFG (IMPAIRED FASTING GLUCOSE): ICD-10-CM

## 2024-02-14 DIAGNOSIS — E66.01 MORBID OBESITY, UNSPECIFIED OBESITY TYPE (H): ICD-10-CM

## 2024-02-14 DIAGNOSIS — L30.4 INTERTRIGO: ICD-10-CM

## 2024-02-14 DIAGNOSIS — E83.52 HYPERCALCEMIA: ICD-10-CM

## 2024-02-14 DIAGNOSIS — I10 BENIGN ESSENTIAL HYPERTENSION: ICD-10-CM

## 2024-02-14 LAB
ALBUMIN SERPL BCG-MCNC: 4 G/DL (ref 3.5–5.2)
ALP SERPL-CCNC: 84 U/L (ref 40–150)
ALT SERPL W P-5'-P-CCNC: 27 U/L (ref 0–50)
ANION GAP SERPL CALCULATED.3IONS-SCNC: 10 MMOL/L (ref 7–15)
AST SERPL W P-5'-P-CCNC: 25 U/L (ref 0–45)
BILIRUB SERPL-MCNC: 0.5 MG/DL
BUN SERPL-MCNC: 16.8 MG/DL (ref 8–23)
CALCIUM SERPL-MCNC: 10 MG/DL (ref 8.8–10.2)
CHLORIDE SERPL-SCNC: 104 MMOL/L (ref 98–107)
CHOLEST SERPL-MCNC: 169 MG/DL
CREAT SERPL-MCNC: 0.7 MG/DL (ref 0.51–0.95)
CREAT UR-MCNC: 107 MG/DL
DEPRECATED HCO3 PLAS-SCNC: 26 MMOL/L (ref 22–29)
EGFRCR SERPLBLD CKD-EPI 2021: >90 ML/MIN/1.73M2
ERYTHROCYTE [DISTWIDTH] IN BLOOD BY AUTOMATED COUNT: 14 % (ref 10–15)
FASTING STATUS PATIENT QL REPORTED: YES
GLUCOSE SERPL-MCNC: 106 MG/DL (ref 70–99)
HBA1C MFR BLD: 5.9 % (ref 0–5.6)
HCT VFR BLD AUTO: 40.7 % (ref 35–47)
HDLC SERPL-MCNC: 35 MG/DL
HGB BLD-MCNC: 12.6 G/DL (ref 11.7–15.7)
LDLC SERPL CALC-MCNC: 109 MG/DL
MCH RBC QN AUTO: 25.9 PG (ref 26.5–33)
MCHC RBC AUTO-ENTMCNC: 31 G/DL (ref 31.5–36.5)
MCV RBC AUTO: 84 FL (ref 78–100)
MICROALBUMIN UR-MCNC: <12 MG/L
MICROALBUMIN/CREAT UR: NORMAL MG/G{CREAT}
NONHDLC SERPL-MCNC: 134 MG/DL
PLATELET # BLD AUTO: 310 10E3/UL (ref 150–450)
POTASSIUM SERPL-SCNC: 3.9 MMOL/L (ref 3.4–5.3)
PROT SERPL-MCNC: 7.1 G/DL (ref 6.4–8.3)
RBC # BLD AUTO: 4.87 10E6/UL (ref 3.8–5.2)
SODIUM SERPL-SCNC: 140 MMOL/L (ref 135–145)
TRIGL SERPL-MCNC: 123 MG/DL
TSH SERPL DL<=0.005 MIU/L-ACNC: 4.16 UIU/ML (ref 0.3–4.2)
URATE SERPL-MCNC: 5.1 MG/DL (ref 2.4–5.7)
VIT D+METAB SERPL-MCNC: 45 NG/ML (ref 20–50)
WBC # BLD AUTO: 7.9 10E3/UL (ref 4–11)

## 2024-02-14 PROCEDURE — 36415 COLL VENOUS BLD VENIPUNCTURE: CPT | Performed by: PEDIATRICS

## 2024-02-14 PROCEDURE — 80061 LIPID PANEL: CPT | Performed by: PEDIATRICS

## 2024-02-14 PROCEDURE — 83036 HEMOGLOBIN GLYCOSYLATED A1C: CPT | Performed by: PEDIATRICS

## 2024-02-14 PROCEDURE — 80053 COMPREHEN METABOLIC PANEL: CPT | Performed by: PEDIATRICS

## 2024-02-14 PROCEDURE — 82570 ASSAY OF URINE CREATININE: CPT | Performed by: PEDIATRICS

## 2024-02-14 PROCEDURE — 84550 ASSAY OF BLOOD/URIC ACID: CPT | Performed by: PEDIATRICS

## 2024-02-14 PROCEDURE — 84443 ASSAY THYROID STIM HORMONE: CPT | Performed by: PEDIATRICS

## 2024-02-14 PROCEDURE — 99214 OFFICE O/P EST MOD 30 MIN: CPT | Mod: 25 | Performed by: PEDIATRICS

## 2024-02-14 PROCEDURE — 82306 VITAMIN D 25 HYDROXY: CPT | Performed by: PEDIATRICS

## 2024-02-14 PROCEDURE — 85027 COMPLETE CBC AUTOMATED: CPT | Performed by: PEDIATRICS

## 2024-02-14 PROCEDURE — 82043 UR ALBUMIN QUANTITATIVE: CPT | Performed by: PEDIATRICS

## 2024-02-14 PROCEDURE — 99397 PER PM REEVAL EST PAT 65+ YR: CPT | Performed by: PEDIATRICS

## 2024-02-14 RX ORDER — ONDANSETRON 4 MG/1
4 TABLET, ORALLY DISINTEGRATING ORAL EVERY 6 HOURS PRN
Qty: 20 TABLET | Refills: 11 | Status: SHIPPED | OUTPATIENT
Start: 2024-02-14

## 2024-02-14 RX ORDER — OMEGA-3S/DHA/EPA/FISH OIL/D3 300MG-1000
1 CAPSULE ORAL DAILY
COMMUNITY
Start: 2000-01-01

## 2024-02-14 RX ORDER — METRONIDAZOLE 7.5 MG/G
1 GEL VAGINAL DAILY
Qty: 35 G | Refills: 11 | Status: SHIPPED | OUTPATIENT
Start: 2024-02-14

## 2024-02-14 RX ORDER — GINSENG 100 MG
50 CAPSULE ORAL DAILY
COMMUNITY
Start: 2022-01-01

## 2024-02-14 RX ORDER — AMLODIPINE BESYLATE 5 MG/1
5 TABLET ORAL DAILY
Qty: 30 TABLET | Refills: 1 | Status: SHIPPED | OUTPATIENT
Start: 2024-02-14 | End: 2024-03-05

## 2024-02-14 RX ORDER — NYSTATIN 100000 [USP'U]/G
POWDER TOPICAL 3 TIMES DAILY
Qty: 60 G | Refills: 3 | Status: SHIPPED | OUTPATIENT
Start: 2024-02-14

## 2024-02-14 RX ORDER — ALBUTEROL SULFATE 0.83 MG/ML
2.5 SOLUTION RESPIRATORY (INHALATION) EVERY 4 HOURS PRN
Qty: 90 ML | Refills: 3 | Status: SHIPPED | OUTPATIENT
Start: 2024-02-14

## 2024-02-14 ASSESSMENT — PAIN SCALES - GENERAL: PAINLEVEL: NO PAIN (0)

## 2024-02-14 NOTE — PATIENT INSTRUCTIONS
Labs today    Refills at the pharmacy    DEXA - we'll help you schedule    Nystatin powder - try if you need it    New med - amlodipine for BP - alert me with any concerns    Come back for nurse only BP check in 2-3 weeks

## 2024-02-14 NOTE — PROGRESS NOTES
Preventive Care Visit  [facility]  Nancy Urban MD, Internal Medicine - Pediatrics  Feb 14, 2024    Assessment & Plan       ICD-10-CM    1. Routine history and physical examination of adult  Z00.00 Comprehensive metabolic panel (BMP + Alb, Alk Phos, ALT, AST, Total. Bili, TP)     Lipid panel reflex to direct LDL Fasting     Hemoglobin A1c     CBC with platelets     TSH with free T4 reflex     Uric acid     Vitamin D Deficiency    UTD on cancer screenings    Politely declines additional vaccines due to history of severe reactions      2. Mild persistent asthma without complication  J45.30 albuterol (PROVENTIL) (2.5 MG/3ML) 0.083% neb solution  Follows with Dr Mercado  Well controlled, continue current medications        3. Chronic idiopathic urticaria  L50.1 Follows with Dr Mercado, improved on current medications      4. Allergic rhinitis, unspecified seasonality, unspecified trigger  J30.9 See above      5. Angioedema, sequela  T78.3XXS       6. Multiple drug allergies  Z88.9       7. IFG (impaired fasting glucose)  R73.01 Hemoglobin A1c  Repeat labs      8. Gastroesophageal reflux disease, unspecified whether esophagitis present  K21.9 Stable, follow      9. Kidney stone  N20.0 Comprehensive metabolic panel (BMP + Alb, Alk Phos, ALT, AST, Total. Bili, TP)     Uric acid    Recent kidney stone, discussed hydration      10. S/P partial colectomy  Z90.49       11. Asymptomatic postmenopausal status  Z78.0 DX Hip/Pelvis/Spine      12. Hypercalcemia  E83.52 Comprehensive metabolic panel (BMP + Alb, Alk Phos, ALT, AST, Total. Bili, TP)  Repeat today, past normal PTH and vit D      13. Intertrigo  L30.4 nystatin (MYCOSTATIN) 873512 UNIT/GM external powder      14. Nausea  R11.0 ondansetron (ZOFRAN ODT) 4 MG ODT tab      15. Bacterial vaginosis  N76.0 metroNIDAZOLE (METROGEL) 0.75 % vaginal gel    B96.89       16. Benign essential hypertension  I10 Comprehensive metabolic panel (BMP + Alb, Alk  "Phos, ALT, AST, Total. Bili, TP)     Lipid panel reflex to direct LDL Fasting     amLODIPine (NORVASC) 5 MG tablet     Albumin Random Urine Quantitative with Creat Ratio     Echocardiogram Complete    New diagnosis - labs today and new medication reviewed.  Nurse only BP check in 2-3 weeks.  Will titrate medications to control      17. Morbid obesity, unspecified obesity type (H)  E66.01 Comprehensive metabolic panel (BMP + Alb, Alk Phos, ALT, AST, Total. Bili, TP)     Lipid panel reflex to direct LDL Fasting     Hemoglobin A1c      18. Heart murmur  R01.1 Echocardiogram Complete  With increased blood pressure, plan repeat echo this year                  BMI  Estimated body mass index is 43.9 kg/m  as calculated from the following:    Height as of this encounter: 1.651 m (5' 5\").    Weight as of this encounter: 119.7 kg (263 lb 12.8 oz).   Weight management plan: Discussed healthy diet and exercise guidelines    Counseling  Appropriate preventive services were discussed with this patient, including applicable screening as appropriate for fall prevention, nutrition, physical activity, Tobacco-use cessation, weight loss and cognition.  Checklist reviewing preventive services available has been given to the patient.  Reviewed patient's diet, addressing concerns and/or questions.   She is at risk for lack of exercise and has been provided with information to increase physical activity for the benefit of her well-being.   Information on urinary incontinence and treatment options given to patient.         See Patient Instructions    Kathryn Brown is a 63 year old, presenting for the following:  Physical        2/14/2024     7:19 AM   Additional Questions   Roomed by Jaylin Cerda   Accompanied by JOSSELIN        Health Care Directive  Patient has a Health Care Directive on file  Advance care planning document is on file and is current.    HPI          2/12/2024   General Health   How would you rate your overall physical " health? Good   Feel stress (tense, anxious, or unable to sleep) Not at all         2/12/2024   Nutrition   Diet: Other   If other, please elaborate: Due to allergies I am limited in what I can eat.         2/12/2024   Exercise   Days per week of moderate/strenous exercise 3 days   Average minutes spent exercising at this level 20 min         2/12/2024   Social Factors   Frequency of gathering with friends or relatives More than three times a week   Worry food won't last until get money to buy more No   Food not last or not have enough money for food? No   Do you have housing?  Yes   Are you worried about losing your housing? No   Lack of transportation? No   Unable to get utilities (heat,electricity)? No         2/12/2024   Fall Risk   Fallen 2 or more times in the past year? No    No   Trouble with walking or balance? No    No          2/12/2024   Activities of Daily Living- Home Safety   Needs help with the following daily activites None of the above   Safety concerns in the home None of the above         2/12/2024   Dental   Dentist two times every year? Yes         2/12/2024   Hearing Screening   Hearing concerns? None of the above         2/12/2024   Driving Risk Screening   Patient/family members have concerns about driving No         2/12/2024   General Alertness/Fatigue Screening   Have you been more tired than usual lately? No         2/12/2024   Urinary Incontinence Screening   Bothered by leaking urine in past 6 months Yes         2/12/2024   TB Screening   Were you born outside of [place]?  No         Today's PHQ-2 Score:       2/14/2024     7:14 AM   PHQ-2 ( 1999 [employer])   Q1: Little interest or pleasure in doing things 0   Q2: Feeling down, depressed or hopeless 0   PHQ-2 Score 0   Q1: Little interest or pleasure in doing things Not at all   Q2: Feeling down, depressed or hopeless Not at all   PHQ-2 Score 0           2/12/2024   Substance Use   Alcohol more than 3/day or more than 7/wk No   Do you have a  current opioid prescription? No   How severe/bad is pain from 1 to 10? 4/10   Do you use any other substances recreationally? No     Social History     Tobacco Use    Smoking status: Never    Smokeless tobacco: Never   Vaping Use    Vaping Use: Never used   Substance Use Topics    Alcohol use: No     Comment: RARE    Drug use: No         4/12/2023   LAST FHS-7 RESULTS   1st degree relative breast or ovarian cancer Yes   Any relative bilateral breast cancer No   Any male have breast cancer No   Any woman have breast and ovarian cancer Yes   Any woman with breast cancer before 50yrs No   2 or more relatives with breast and/or ovarian cancer Yes   2 or more relatives with breast and/or bowel cancer Yes       Mammogram Screening - Mammogram every 1-2 years updated in Health Maintenance based on mutual decision making      History of abnormal Pap smear: Status post benign hysterectomy. Health Maintenance and Surgical History updated.        12/18/2008    12:00 AM 8/7/2006    12:00 AM   PAP / HPV   PAP (Historical) Acellular, No Test (Use with Unsat vials w/o brush)  NIL      The 10-year ASCVD risk score (Jailene COX, et al., 2019) is: 10.6%    Values used to calculate the score:      Age: 63 years      Sex: Female      Is Non- : No      Diabetic: No      Tobacco smoker: No      Systolic Blood Pressure: 156 mmHg      Is BP treated: Yes      HDL Cholesterol: 49 mg/dL      Total Cholesterol: 173 mg/dL        Reviewed and updated as needed this visit by Provider                    Dr Mercado follows for asthma and extensive allergies - at last visit - on Xolair, ICS, antihistamine regimen.  Urticaria/angioedema issues chronically - multiple triggers that limit life    Kidney stone earlier this month - passed while at [facility]    Physical therapy - [facility] with Aud[facility] at [facility] - working on breathing muscles after extensive breast reduction    Food allergies - extensive - limit her ability to eat  many thinks    Breast reduction in May of last year was life changing - back/shoulder pain improved, restrictive and obstructive lung disease are improved.  Working with physical therapy to relearn how to use muscles of chest/trunk    GERD - controlled    Has had hypercalcemia on recent labs - due for recheck    Working on healthy lifestyle choices, very limited portions    BP elevated, no new cardiac symptoms. Reviewing past blood pressure results, has recurrent elevated results        Current providers sharing in care for this patient include:  Patient Care Team:  Nancy Urban MD as PCP - General (Internal Medicine)  Angelique Alston, RN as Personal Advocate & Liaison (PAL) (Nurse)  Nancy Urban MD as Assigned PCP    The following health maintenance items are reviewed in Epic and correct as of today:  Health Maintenance   Topic Date Due    DEXA  Never done    Pneumococcal Vaccine: 65+ Years (1 of 2 - PCV) Never done    ZOSTER IMMUNIZATION (1 of 2) Never done    RSV VACCINE (Pregnancy & 60+) (1 - 1-dose 60+ series) Never done    COVID-19 Vaccine (3 - 2023-24 season) 09/01/2023    ANNUAL REVIEW OF HM ORDERS  01/18/2024    MEDICARE ANNUAL WELLNESS VISIT  01/18/2024    ASTHMA ACTION PLAN  01/18/2024    ASTHMA CONTROL TEST  08/14/2024    MAMMO SCREENING  02/07/2025    FALL RISK ASSESSMENT  02/14/2025    COLORECTAL CANCER SCREENING  01/20/2026    GLUCOSE  04/13/2026    LIPID  01/18/2028    DTAP/TDAP/TD IMMUNIZATION (3 - Td or Tdap) 03/12/2028    ADVANCE CARE PLANNING  02/14/2029    HEPATITIS C SCREENING  Completed    PHQ-2 (once per calendar year)  Completed    INFLUENZA VACCINE  Completed    HIV SCREENING  Addressed    IPV IMMUNIZATION  Aged Out    HPV IMMUNIZATION  Aged Out    MENINGITIS IMMUNIZATION  Aged Out    RSV MONOCLONAL ANTIBODY  Aged Out    PAP  Discontinued         Review of Systems  Constitutional, neuro, ENT, endocrine, pulmonary, cardiac, gastrointestinal, genitourinary,  "musculoskeletal, integument and psychiatric systems are negative, except as otherwise noted.     Objective    Exam  BP (!) 156/70 (BP Location: Right arm, Patient Position: Sitting, Cuff Size: Adult Large)   Pulse 82   Temp 97  F (36.1  C) (Tympanic)   Resp 16   Ht 1.651 m (5' 5\")   Wt 119.7 kg (263 lb 12.8 oz)   LMP 06/04/2010   SpO2 98%   BMI 43.90 kg/m     Estimated body mass index is 43.9 kg/m  as calculated from the following:    Height as of this encounter: 1.651 m (5' 5\").    Weight as of this encounter: 119.7 kg (263 lb 12.8 oz).  Wt Readings from Last 4 Encounters:   02/14/24 119.7 kg (263 lb 12.8 oz)   05/01/23 124.2 kg (273 lb 14.4 oz)   04/13/23 123.3 kg (271 lb 12.8 oz)   03/01/23 120.2 kg (265 lb)         Physical Exam  GENERAL: alert and no distress  EYES: Eyes grossly normal to inspection, PERRL and conjunctivae and sclerae normal  HENT: ear canals and TM's normal, nose and mouth without ulcers or lesions  NECK: no adenopathy, no asymmetry, masses, or scars  RESP: lungs clear to auscultation - no rales, rhonchi or wheezes  BREAST: normal without masses, tenderness or nipple discharge, no axillary LAD, well healed breast reduction surgery scares  CV: regular rate and rhythm, normal S1 S2, no S3 or S4, 2/6 anjali loudest at lusb, click or rub, no peripheral edema  ABDOMEN: soft, nontender, no hepatosplenomegaly, no masses and bowel sounds normal  MS: swelling around ankles with mild tenderness, no synovitis or gross joint deformities  SKIN: no suspicious lesions or rashes  NEURO: Normal strength and tone, mentation intact and speech normal  PSYCH: mentation appears normal, affect normal/bright         2/14/2024   Mini Cog   Clock Draw Score 2 Normal   3 Item Recall 2 objects recalled   Mini Cog Total Score 4           Signed Electronically by: Nancy Ubran MD    "

## 2024-02-16 ENCOUNTER — ANCILLARY PROCEDURE (OUTPATIENT)
Dept: BONE DENSITY | Facility: CLINIC | Age: 64
End: 2024-02-16
Attending: PEDIATRICS
Payer: COMMERCIAL

## 2024-02-16 DIAGNOSIS — Z78.0 ASYMPTOMATIC POSTMENOPAUSAL STATUS: ICD-10-CM

## 2024-02-16 PROCEDURE — 77080 DXA BONE DENSITY AXIAL: CPT | Mod: TC | Performed by: RADIOLOGY

## 2024-02-17 PROBLEM — M85.88 OSTEOPENIA OF LUMBAR SPINE: Status: ACTIVE | Noted: 2024-02-17

## 2024-02-26 ENCOUNTER — HOSPITAL ENCOUNTER (OUTPATIENT)
Dept: CARDIOLOGY | Facility: CLINIC | Age: 64
Discharge: HOME | End: 2024-02-26
Attending: PEDIATRICS | Admitting: PEDIATRICS
Payer: COMMERCIAL

## 2024-02-26 DIAGNOSIS — R01.1 HEART MURMUR: ICD-10-CM

## 2024-02-26 DIAGNOSIS — I10 BENIGN ESSENTIAL HYPERTENSION: ICD-10-CM

## 2024-02-26 LAB — LVEF ECHO: NORMAL

## 2024-02-26 PROCEDURE — 93306 TTE W/DOPPLER COMPLETE: CPT

## 2024-02-26 PROCEDURE — 93306 TTE W/DOPPLER COMPLETE: CPT | Mod: 26 | Performed by: INTERNAL MEDICINE

## 2024-02-28 ENCOUNTER — ALLIED HEALTH/NURSE VISIT (OUTPATIENT)
Dept: PEDIATRICS | Facility: CLINIC | Age: 64
End: 2024-02-28
Payer: COMMERCIAL

## 2024-02-28 VITALS — DIASTOLIC BLOOD PRESSURE: 76 MMHG | SYSTOLIC BLOOD PRESSURE: 138 MMHG

## 2024-02-28 DIAGNOSIS — I10 BENIGN ESSENTIAL HYPERTENSION: Primary | ICD-10-CM

## 2024-02-28 PROCEDURE — 99207 PR NO CHARGE NURSE ONLY: CPT

## 2024-02-28 NOTE — PROGRESS NOTES
I met with Stephanie Mendez at the request of Dr Urban to recheck her blood pressure.  Blood pressure medications on the med list were reviewed with patient.    Patient has taken all medications as per usual regimen: Yes  Patient reports tolerating them without any issues or concerns: Yes    Vitals:    02/28/24 1008 02/28/24 1016   BP: (!) 146/80 138/76       After 5 minutes, the patient's blood pressure was <140/90, the previous encounter was reviewed, recorded blood pressure below 140/90. Patient was discharged and the note will be sent to the provider for final review.

## 2024-03-05 ENCOUNTER — OFFICE VISIT (OUTPATIENT)
Dept: PEDIATRICS | Facility: CLINIC | Age: 64
End: 2024-03-05
Payer: COMMERCIAL

## 2024-03-05 ENCOUNTER — MYC MEDICAL ADVICE (OUTPATIENT)
Dept: PEDIATRICS | Facility: CLINIC | Age: 64
End: 2024-03-05

## 2024-03-05 VITALS
BODY MASS INDEX: 43.58 KG/M2 | HEART RATE: 92 BPM | OXYGEN SATURATION: 98 % | TEMPERATURE: 98 F | WEIGHT: 261.9 LBS | RESPIRATION RATE: 14 BRPM | SYSTOLIC BLOOD PRESSURE: 138 MMHG | DIASTOLIC BLOOD PRESSURE: 70 MMHG

## 2024-03-05 DIAGNOSIS — I10 BENIGN ESSENTIAL HYPERTENSION: Primary | ICD-10-CM

## 2024-03-05 DIAGNOSIS — I77.810 MILD ASCENDING AORTA DILATION (H): ICD-10-CM

## 2024-03-05 DIAGNOSIS — R73.01 IFG (IMPAIRED FASTING GLUCOSE): ICD-10-CM

## 2024-03-05 PROCEDURE — 99214 OFFICE O/P EST MOD 30 MIN: CPT | Performed by: PEDIATRICS

## 2024-03-05 RX ORDER — METFORMIN HCL 500 MG
500 TABLET, EXTENDED RELEASE 24 HR ORAL
Qty: 30 TABLET | Refills: 5 | Status: SHIPPED | OUTPATIENT
Start: 2024-03-05

## 2024-03-05 RX ORDER — AMLODIPINE BESYLATE 10 MG/1
5 TABLET ORAL DAILY
Qty: 90 TABLET | Refills: 3 | Status: SHIPPED | OUTPATIENT
Start: 2024-03-05 | End: 2024-06-05

## 2024-03-05 ASSESSMENT — PAIN SCALES - GENERAL: PAINLEVEL: NO PAIN (0)

## 2024-03-05 NOTE — PATIENT INSTRUCTIONS
Increase amlodipine to 10mg daily.   I love the idea for a BP cuff for your anniversary! Send me some readings in a few weeks.    Start metformin when you are ready - 1 pill with dinner.    Keep an eye on your right leg and see Dr Del Angel if not improving    We'll repeat your echocardiogram in 1 year    DEXA scan looks great

## 2024-03-05 NOTE — PROGRESS NOTES
Assessment & Plan       ICD-10-CM    1. Benign essential hypertension  I10 amLODIPine (NORVASC) 10 MG tablet    Not quite optimally controlled yet - plan to increase dosing of amlodipine to 10mg and follow.  Will get home cuff and send me readings in 4 weeks      2. Mild ascending aorta dilation (H24) - repeat echo 2/2025  I77.810 Plan repeat in 1 year - results reviewed today      3. IFG (impaired fasting glucose)  R73.01 metFORMIN (GLUCOPHAGE XR) 500 MG 24 hr tablet    Plan to start metformin - new medication reviewed - patient to alert me with any problems      4. BMI 40.0-44.9, adult (H)  Z68.41 Weight trending down with her healthy eating efforts and working on activity              See Patient Instructions    Kathryn Brown is a 63 year old, presenting for the following health issues:  Follow Up        3/5/2024    10:13 AM   Additional Questions   Roomed by Jaylin Cerda   Accompanied by JOSSELIN     History of Present Illness       Reason for visit:  To get instructions about my new diagnoises  Symptom onset:  More than a month  Symptom intensity:  Moderate  Symptom progression:  Worsening  Had these symptoms before:  No    She eats 2-3 servings of fruits and vegetables daily.She consumes 0 sweetened beverage(s) daily.She exercises with enough effort to increase her heart rate 9 or less minutes per day.  She exercises with enough effort to increase her heart rate 3 or less days per week.   She is taking medications regularly.       HTN - new diagnoses at last visit.  Started amlodipine.  BP better, but still not quite at goal.    Labs with elevated blood sugar - IFG - A1C slowly increasing - now 5.9%    Echocardiogram - reviewed results - LVH present, ascending aorta dilation        Objective    /70 (BP Location: Right arm, Patient Position: Sitting, Cuff Size: Adult Large)   Pulse 92   Temp 98  F (36.7  C) (Tympanic)   Resp 14   Wt 118.8 kg (261 lb 14.4 oz)   LMP 06/04/2010   SpO2 98%   BMI  43.58 kg/m    Body mass index is 43.58 kg/m .  Wt Readings from Last 4 Encounters:   03/05/24 118.8 kg (261 lb 14.4 oz)   02/14/24 119.7 kg (263 lb 12.8 oz)   05/01/23 124.2 kg (273 lb 14.4 oz)   04/13/23 123.3 kg (271 lb 12.8 oz)       Physical Exam   GENERAL: alert and no distress  RESP: lungs clear to auscultation - no rales, rhonchi or wheezes  CV: regular rates and rhythm, normal S1 S2, no S3 or S4, and grade 2/6 anjali murmur heard best over the lusb  PSYCH: mentation appears normal, affect normal/bright    Reviewed labs and echo and DEXA in detail        Signed Electronically by: Nancy Urban MD

## 2024-03-05 NOTE — TELEPHONE ENCOUNTER
"Dr Urban: See pt message and advise.    She was seen in the [facility] on 02/01/2024 for L ureteral stone, suspected had already passed and noted nonobstructed calcified stones in calyces. Notes no appendix, still has gallbladder. No additional treatment given during discharge.    Provided some [facility] literature in message, but asking for additional advise.      - Orion \"Santos\" Raiza (he/him/his), RN - Patient Advocate Liason (PAL)  [facility]    "

## 2024-05-30 ENCOUNTER — TRANSFERRED RECORDS (OUTPATIENT)
Dept: HEALTH INFORMATION MANAGEMENT | Facility: CLINIC | Age: 64
End: 2024-05-30
Payer: COMMERCIAL

## 2024-06-04 ENCOUNTER — MYC MEDICAL ADVICE (OUTPATIENT)
Dept: PEDIATRICS | Facility: CLINIC | Age: 64
End: 2024-06-04
Payer: COMMERCIAL

## 2024-06-04 DIAGNOSIS — I10 BENIGN ESSENTIAL HYPERTENSION: Primary | ICD-10-CM

## 2024-06-05 RX ORDER — IRBESARTAN 75 MG/1
75 TABLET ORAL AT BEDTIME
Qty: 90 TABLET | Refills: 3 | Status: SHIPPED | OUTPATIENT
Start: 2024-06-05 | End: 2024-09-10

## 2024-09-10 ENCOUNTER — MYC MEDICAL ADVICE (OUTPATIENT)
Dept: PEDIATRICS | Facility: CLINIC | Age: 64
End: 2024-09-10
Payer: COMMERCIAL

## 2024-09-10 DIAGNOSIS — I10 BENIGN ESSENTIAL HYPERTENSION: ICD-10-CM

## 2024-09-10 RX ORDER — IRBESARTAN 150 MG/1
150 TABLET ORAL AT BEDTIME
Qty: 90 TABLET | Refills: 3 | Status: SHIPPED | OUTPATIENT
Start: 2024-09-10

## 2024-09-27 ENCOUNTER — E-VISIT (OUTPATIENT)
Dept: PEDIATRICS | Facility: CLINIC | Age: 64
End: 2024-09-27
Payer: COMMERCIAL

## 2024-09-27 DIAGNOSIS — N20.0 URIC ACID KIDNEY STONE: Primary | ICD-10-CM

## 2024-09-27 PROCEDURE — 99422 OL DIG E/M SVC 11-20 MIN: CPT | Performed by: PEDIATRICS

## 2024-09-30 PROBLEM — N20.0 URIC ACID KIDNEY STONE: Status: ACTIVE | Noted: 2024-09-30

## 2024-09-30 RX ORDER — POTASSIUM CITRATE 15 MEQ/1
15 TABLET, EXTENDED RELEASE ORAL DAILY
Qty: 30 TABLET | Refills: 5 | Status: SHIPPED | OUTPATIENT
Start: 2024-09-30

## 2024-10-13 ENCOUNTER — LAB (OUTPATIENT)
Dept: LAB | Facility: CLINIC | Age: 64
End: 2024-10-13
Payer: COMMERCIAL

## 2024-10-13 DIAGNOSIS — N20.0 URIC ACID KIDNEY STONE: ICD-10-CM

## 2024-10-13 DIAGNOSIS — I10 BENIGN ESSENTIAL HYPERTENSION: ICD-10-CM

## 2024-10-13 LAB
ALBUMIN UR-MCNC: NEGATIVE MG/DL
APPEARANCE UR: ABNORMAL
BACTERIA #/AREA URNS HPF: ABNORMAL /HPF
BILIRUB UR QL STRIP: NEGATIVE
COLOR UR AUTO: YELLOW
GLUCOSE UR STRIP-MCNC: NEGATIVE MG/DL
HGB UR QL STRIP: ABNORMAL
KETONES UR STRIP-MCNC: NEGATIVE MG/DL
LEUKOCYTE ESTERASE UR QL STRIP: ABNORMAL
NITRATE UR QL: NEGATIVE
PH UR STRIP: 5 [PH] (ref 5–7)
RBC #/AREA URNS AUTO: ABNORMAL /HPF
SP GR UR STRIP: 1.01 (ref 1–1.03)
SQUAMOUS #/AREA URNS AUTO: ABNORMAL /LPF
UROBILINOGEN UR STRIP-ACNC: 0.2 E.U./DL
WBC #/AREA URNS AUTO: ABNORMAL /HPF

## 2024-10-13 PROCEDURE — 80048 BASIC METABOLIC PNL TOTAL CA: CPT

## 2024-10-13 PROCEDURE — 36415 COLL VENOUS BLD VENIPUNCTURE: CPT

## 2024-10-13 PROCEDURE — 81001 URINALYSIS AUTO W/SCOPE: CPT

## 2024-10-14 LAB
ANION GAP SERPL CALCULATED.3IONS-SCNC: 10 MMOL/L (ref 7–15)
BUN SERPL-MCNC: 14.7 MG/DL (ref 8–23)
CALCIUM SERPL-MCNC: 9.7 MG/DL (ref 8.8–10.4)
CHLORIDE SERPL-SCNC: 103 MMOL/L (ref 98–107)
CREAT SERPL-MCNC: 0.68 MG/DL (ref 0.51–0.95)
EGFRCR SERPLBLD CKD-EPI 2021: >90 ML/MIN/1.73M2
GLUCOSE SERPL-MCNC: 92 MG/DL (ref 70–99)
HCO3 SERPL-SCNC: 26 MMOL/L (ref 22–29)
POTASSIUM SERPL-SCNC: 4.3 MMOL/L (ref 3.4–5.3)
SODIUM SERPL-SCNC: 139 MMOL/L (ref 135–145)

## 2024-10-15 LAB — BACTERIA UR CULT: NORMAL

## 2024-11-25 DIAGNOSIS — R73.01 IFG (IMPAIRED FASTING GLUCOSE): ICD-10-CM

## 2024-11-25 RX ORDER — METFORMIN HYDROCHLORIDE 500 MG/1
500 TABLET, EXTENDED RELEASE ORAL
Qty: 30 TABLET | Refills: 5 | Status: SHIPPED | OUTPATIENT
Start: 2024-11-25

## 2025-02-10 ENCOUNTER — ANCILLARY PROCEDURE (OUTPATIENT)
Dept: MAMMOGRAPHY | Facility: CLINIC | Age: 65
End: 2025-02-10
Attending: PEDIATRICS
Payer: COMMERCIAL

## 2025-02-10 DIAGNOSIS — Z12.31 VISIT FOR SCREENING MAMMOGRAM: ICD-10-CM

## 2025-02-10 PROCEDURE — 77063 BREAST TOMOSYNTHESIS BI: CPT | Mod: TC | Performed by: RADIOLOGY

## 2025-02-10 PROCEDURE — 77067 SCR MAMMO BI INCL CAD: CPT | Mod: TC | Performed by: RADIOLOGY

## 2025-03-05 SDOH — HEALTH STABILITY: PHYSICAL HEALTH: ON AVERAGE, HOW MANY DAYS PER WEEK DO YOU ENGAGE IN MODERATE TO STRENUOUS EXERCISE (LIKE A BRISK WALK)?: 0 DAYS

## 2025-03-05 SDOH — HEALTH STABILITY: PHYSICAL HEALTH: ON AVERAGE, HOW MANY MINUTES DO YOU ENGAGE IN EXERCISE AT THIS LEVEL?: 0 MIN

## 2025-03-05 ASSESSMENT — ASTHMA QUESTIONNAIRES
QUESTION_2 LAST FOUR WEEKS HOW OFTEN HAVE YOU HAD SHORTNESS OF BREATH: NOT AT ALL
ACT_TOTALSCORE: 24
ACT_TOTALSCORE: 24
QUESTION_1 LAST FOUR WEEKS HOW MUCH OF THE TIME DID YOUR ASTHMA KEEP YOU FROM GETTING AS MUCH DONE AT WORK, SCHOOL OR AT HOME: NONE OF THE TIME
QUESTION_5 LAST FOUR WEEKS HOW WOULD YOU RATE YOUR ASTHMA CONTROL: COMPLETELY CONTROLLED
QUESTION_4 LAST FOUR WEEKS HOW OFTEN HAVE YOU USED YOUR RESCUE INHALER OR NEBULIZER MEDICATION (SUCH AS ALBUTEROL): NOT AT ALL
QUESTION_3 LAST FOUR WEEKS HOW OFTEN DID YOUR ASTHMA SYMPTOMS (WHEEZING, COUGHING, SHORTNESS OF BREATH, CHEST TIGHTNESS OR PAIN) WAKE YOU UP AT NIGHT OR EARLIER THAN USUAL IN THE MORNING: ONCE OR TWICE

## 2025-03-05 ASSESSMENT — PATIENT HEALTH QUESTIONNAIRE - PHQ9
SUM OF ALL RESPONSES TO PHQ QUESTIONS 1-9: 8
SUM OF ALL RESPONSES TO PHQ QUESTIONS 1-9: 8
10. IF YOU CHECKED OFF ANY PROBLEMS, HOW DIFFICULT HAVE THESE PROBLEMS MADE IT FOR YOU TO DO YOUR WORK, TAKE CARE OF THINGS AT HOME, OR GET ALONG WITH OTHER PEOPLE: NOT DIFFICULT AT ALL

## 2025-03-06 ENCOUNTER — OFFICE VISIT (OUTPATIENT)
Dept: PEDIATRICS | Facility: CLINIC | Age: 65
End: 2025-03-06
Payer: COMMERCIAL

## 2025-03-06 VITALS
HEART RATE: 79 BPM | RESPIRATION RATE: 16 BRPM | DIASTOLIC BLOOD PRESSURE: 64 MMHG | WEIGHT: 268 LBS | TEMPERATURE: 96.9 F | HEIGHT: 65 IN | OXYGEN SATURATION: 97 % | BODY MASS INDEX: 44.65 KG/M2 | SYSTOLIC BLOOD PRESSURE: 124 MMHG

## 2025-03-06 DIAGNOSIS — Z00.00 ROUTINE GENERAL MEDICAL EXAMINATION AT A HEALTH CARE FACILITY: Primary | ICD-10-CM

## 2025-03-06 DIAGNOSIS — R11.0 NAUSEA: ICD-10-CM

## 2025-03-06 DIAGNOSIS — M85.88 OSTEOPENIA OF LUMBAR SPINE: ICD-10-CM

## 2025-03-06 DIAGNOSIS — T78.3XXS ANGIOEDEMA, SEQUELA: ICD-10-CM

## 2025-03-06 DIAGNOSIS — K21.9 GASTROESOPHAGEAL REFLUX DISEASE, UNSPECIFIED WHETHER ESOPHAGITIS PRESENT: ICD-10-CM

## 2025-03-06 DIAGNOSIS — L50.1 CHRONIC IDIOPATHIC URTICARIA: ICD-10-CM

## 2025-03-06 DIAGNOSIS — Z91.018 ALLERGY, FOOD: ICD-10-CM

## 2025-03-06 DIAGNOSIS — J45.30 MILD PERSISTENT ASTHMA WITHOUT COMPLICATION: ICD-10-CM

## 2025-03-06 DIAGNOSIS — R73.01 IFG (IMPAIRED FASTING GLUCOSE): ICD-10-CM

## 2025-03-06 DIAGNOSIS — I77.810 MILD ASCENDING AORTA DILATION: ICD-10-CM

## 2025-03-06 DIAGNOSIS — I10 BENIGN ESSENTIAL HYPERTENSION: ICD-10-CM

## 2025-03-06 DIAGNOSIS — N20.0 URIC ACID KIDNEY STONE: ICD-10-CM

## 2025-03-06 DIAGNOSIS — J30.9 ALLERGIC RHINITIS, UNSPECIFIED SEASONALITY, UNSPECIFIED TRIGGER: ICD-10-CM

## 2025-03-06 DIAGNOSIS — L30.4 INTERTRIGO: ICD-10-CM

## 2025-03-06 PROBLEM — J45.50 SEVERE PERSISTENT ASTHMA WITHOUT COMPLICATION (H): Status: ACTIVE | Noted: 2025-03-06

## 2025-03-06 PROBLEM — J45.50 SEVERE PERSISTENT ASTHMA WITHOUT COMPLICATION (H): Status: RESOLVED | Noted: 2025-03-06 | Resolved: 2025-03-06

## 2025-03-06 LAB
ALBUMIN SERPL BCG-MCNC: 4 G/DL (ref 3.5–5.2)
ALP SERPL-CCNC: 90 U/L (ref 40–150)
ALT SERPL W P-5'-P-CCNC: 21 U/L (ref 0–50)
ANION GAP SERPL CALCULATED.3IONS-SCNC: 9 MMOL/L (ref 7–15)
AST SERPL W P-5'-P-CCNC: 20 U/L (ref 0–45)
BILIRUB SERPL-MCNC: 0.4 MG/DL
BUN SERPL-MCNC: 21.3 MG/DL (ref 8–23)
CALCIUM SERPL-MCNC: 10.1 MG/DL (ref 8.8–10.4)
CHLORIDE SERPL-SCNC: 105 MMOL/L (ref 98–107)
CHOLEST SERPL-MCNC: 188 MG/DL
CREAT SERPL-MCNC: 0.73 MG/DL (ref 0.51–0.95)
CREAT UR-MCNC: 168 MG/DL
EGFRCR SERPLBLD CKD-EPI 2021: 90 ML/MIN/1.73M2
ERYTHROCYTE [DISTWIDTH] IN BLOOD BY AUTOMATED COUNT: 13.3 % (ref 10–15)
EST. AVERAGE GLUCOSE BLD GHB EST-MCNC: 120 MG/DL
FASTING STATUS PATIENT QL REPORTED: YES
FASTING STATUS PATIENT QL REPORTED: YES
GLUCOSE SERPL-MCNC: 108 MG/DL (ref 70–99)
HBA1C MFR BLD: 5.8 % (ref 0–5.6)
HCO3 SERPL-SCNC: 28 MMOL/L (ref 22–29)
HCT VFR BLD AUTO: 38.6 % (ref 35–47)
HDLC SERPL-MCNC: 39 MG/DL
HGB BLD-MCNC: 12.4 G/DL (ref 11.7–15.7)
LDLC SERPL CALC-MCNC: 127 MG/DL
MCH RBC QN AUTO: 27.4 PG (ref 26.5–33)
MCHC RBC AUTO-ENTMCNC: 32.1 G/DL (ref 31.5–36.5)
MCV RBC AUTO: 85 FL (ref 78–100)
MICROALBUMIN UR-MCNC: <12 MG/L
MICROALBUMIN/CREAT UR: NORMAL MG/G{CREAT}
NONHDLC SERPL-MCNC: 149 MG/DL
PLATELET # BLD AUTO: 271 10E3/UL (ref 150–450)
POTASSIUM SERPL-SCNC: 4 MMOL/L (ref 3.4–5.3)
PROT SERPL-MCNC: 7 G/DL (ref 6.4–8.3)
RBC # BLD AUTO: 4.52 10E6/UL (ref 3.8–5.2)
SODIUM SERPL-SCNC: 142 MMOL/L (ref 135–145)
TRIGL SERPL-MCNC: 110 MG/DL
TSH SERPL DL<=0.005 MIU/L-ACNC: 3.47 UIU/ML (ref 0.3–4.2)
WBC # BLD AUTO: 7 10E3/UL (ref 4–11)

## 2025-03-06 RX ORDER — METFORMIN HYDROCHLORIDE 500 MG/1
500 TABLET, EXTENDED RELEASE ORAL
Qty: 90 TABLET | Refills: 5 | Status: SHIPPED | OUTPATIENT
Start: 2025-03-06

## 2025-03-06 RX ORDER — NYSTATIN 100000 [USP'U]/G
POWDER TOPICAL 3 TIMES DAILY
Qty: 60 G | Refills: 3 | Status: SHIPPED | OUTPATIENT
Start: 2025-03-06

## 2025-03-06 RX ORDER — ONDANSETRON 4 MG/1
4 TABLET, ORALLY DISINTEGRATING ORAL EVERY 6 HOURS PRN
Qty: 20 TABLET | Refills: 11 | Status: SHIPPED | OUTPATIENT
Start: 2025-03-06

## 2025-03-06 RX ORDER — POTASSIUM CITRATE 15 MEQ/1
15 TABLET, EXTENDED RELEASE ORAL DAILY
Qty: 30 TABLET | Refills: 5 | Status: SHIPPED | OUTPATIENT
Start: 2025-03-06

## 2025-03-06 RX ORDER — IRBESARTAN 150 MG/1
150 TABLET ORAL AT BEDTIME
Qty: 90 TABLET | Refills: 3 | Status: SHIPPED | OUTPATIENT
Start: 2025-03-06

## 2025-03-06 RX ORDER — ALBUTEROL SULFATE 0.83 MG/ML
2.5 SOLUTION RESPIRATORY (INHALATION) EVERY 4 HOURS PRN
Qty: 90 ML | Refills: 3 | Status: SHIPPED | OUTPATIENT
Start: 2025-03-06

## 2025-03-06 ASSESSMENT — PAIN SCALES - GENERAL: PAINLEVEL_OUTOF10: NO PAIN (0)

## 2025-03-06 NOTE — PATIENT INSTRUCTIONS
Patient Education   Preventive Care Advice   This is general advice given by our system to help you stay healthy. However, your care team may have specific advice just for you. Please talk to your care team about your preventive care needs.  Nutrition  Eat 5 or more servings of fruits and vegetables each day.  Try wheat bread, brown rice and whole grain pasta (instead of white bread, rice, and pasta).  Get enough calcium and vitamin D. Check the label on foods and aim for 100% of the RDA (recommended daily allowance).  Lifestyle  Exercise at least 150 minutes each week  (30 minutes a day, 5 days a week).  Do muscle strengthening activities 2 days a week. These help control your weight and prevent disease.  No smoking.  Wear sunscreen to prevent skin cancer.  Have a dental exam and cleaning every 6 months.  Yearly exams  See your health care team every year to talk about:  Any changes in your health.  Any medicines your care team has prescribed.  Preventive care, family planning, and ways to prevent chronic diseases.  Shots (vaccines)   HPV shots (up to age 26), if you've never had them before.  Hepatitis B shots (up to age 59), if you've never had them before.  COVID-19 shot: Get this shot when it's due.  Flu shot: Get a flu shot every year.  Tetanus shot: Get a tetanus shot every 10 years.  Pneumococcal, hepatitis A, and RSV shots: Ask your care team if you need these based on your risk.  Shingles shot (for age 50 and up)  General health tests  Diabetes screening:  Starting at age 35, Get screened for diabetes at least every 3 years.  If you are younger than age 35, ask your care team if you should be screened for diabetes.  Cholesterol test: At age 39, start having a cholesterol test every 5 years, or more often if advised.  Bone density scan (DEXA): At age 50, ask your care team if you should have this scan for osteoporosis (brittle bones).  Hepatitis C: Get tested at least once in your life.  STIs (sexually  transmitted infections)  Before age 24: Ask your care team if you should be screened for STIs.  After age 24: Get screened for STIs if you're at risk. You are at risk for STIs (including HIV) if:  You are sexually active with more than one person.  You don't use condoms every time.  You or a partner was diagnosed with a sexually transmitted infection.  If you are at risk for HIV, ask about PrEP medicine to prevent HIV.  Get tested for HIV at least once in your life, whether you are at risk for HIV or not.  Cancer screening tests  Cervical cancer screening: If you have a cervix, begin getting regular cervical cancer screening tests starting at age 21.  Breast cancer scan (mammogram): If you've ever had breasts, begin having regular mammograms starting at age 40. This is a scan to check for breast cancer.  Colon cancer screening: It is important to start screening for colon cancer at age 45.  Have a colonoscopy test every 10 years (or more often if you're at risk) Or, ask your provider about stool tests like a FIT test every year or Cologuard test every 3 years.  To learn more about your testing options, visit:   .  For help making a decision, visit:   https://bit.ly/kg57788.  Prostate cancer screening test: If you have a prostate, ask your care team if a prostate cancer screening test (PSA) at age 55 is right for you.  Lung cancer screening: If you are a current or former smoker ages 50 to 80, ask your care team if ongoing lung cancer screenings are right for you.  For informational purposes only. Not to replace the advice of your health care provider. Copyright   2023 [employer]. All rights reserved. Clinically reviewed by the [facility] Transitions Program. [employer] 990070 - REV 01/24.

## 2025-03-06 NOTE — PROGRESS NOTES
Answers submitted by the patient for this visit:  Patient Health Questionnaire (Submitted on 3/5/2025)  If you checked off any problems, how difficult have these problems made it for you to do your work, take care of things at home, or get along with other people?: Not difficult at all  PHQ9 TOTAL SCORE: 8  Preventive Care Visit  [facility] YOVANY Urban MD, Internal Medicine - Pediatrics  Mar 6, 2025      Assessment & Plan       ICD-10-CM    1. Routine general medical examination at a health care facility  Z00.00 PRIMARY CARE FOLLOW-UP SCHEDULING     Hemoglobin A1c     Comprehensive metabolic panel (BMP + Alb, Alk Phos, ALT, AST, Total. Bili, TP)     Lipid panel reflex to direct LDL Fasting     TSH with free T4 reflex     CBC with platelets     Albumin Random Urine Quantitative with Creat Ratio     Hemoglobin A1c     Comprehensive metabolic panel (BMP + Alb, Alk Phos, ALT, AST, Total. Bili, TP)     Lipid panel reflex to direct LDL Fasting     TSH with free T4 reflex     CBC with platelets     Albumin Random Urine Quantitative with Creat Ratio    UTD on cancer screenings  Politely declines additional vaccines        2. BMI 40.0-44.9, adult (H)  Z68.41 Hemoglobin A1c     Comprehensive metabolic panel (BMP + Alb, Alk Phos, ALT, AST, Total. Bili, TP)     Lipid panel reflex to direct LDL Fasting     Hemoglobin A1c     Comprehensive metabolic panel (BMP + Alb, Alk Phos, ALT, AST, Total. Bili, TP)     Lipid panel reflex to direct LDL Fasting    Complicating management of HTN, IFG - encouraged in her efforts to eat as nutritiously as possible and move as much as possible      3. Benign essential hypertension  I10 Comprehensive metabolic panel (BMP + Alb, Alk Phos, ALT, AST, Total. Bili, TP)     Lipid panel reflex to direct LDL Fasting     Albumin Random Urine Quantitative with Creat Ratio     irbesartan (AVAPRO) 150 MG tablet     Comprehensive metabolic panel (BMP + Alb, Alk Phos, ALT, AST,  Total. Bili, TP)     Lipid panel reflex to direct LDL Fasting     Albumin Random Urine Quantitative with Creat Ratio    Well controlled, continue current medications        4. Mild persistent asthma without complication  J45.30 albuterol (PROVENTIL) (2.5 MG/3ML) 0.083% neb solution    Well controlled, continue current medications        5. Uric acid kidney stone  N20.0 potassium citrate 15 MEQ (1620 MG) TBCR    Continuing with her efforts with hydration.  Just passed more stones this week.   Had been on tumeric supplement, which can increase risk of stones - patient will stop and continue potassium citrate.      6. Allergic rhinitis, unspecified seasonality, unspecified trigger  J30.9 Follows with Dr Mercado.   Severe allergy symptoms, chronic urticaria, hx of anaphylaxis and angioedema          7. Allergy, food  Z91.018       8. Chronic idiopathic urticaria  L50.1 Has been working to cut down on doxepin - sleep has been impacted      9. Osteopenia of lumbar spine  M85.88 Due for next DEXA in 2027      10. Gastroesophageal reflux disease, unspecified whether esophagitis present  K21.9 Well controlled on OTC PPI dosing      11. IFG (impaired fasting glucose)  R73.01 Hemoglobin A1c     metFORMIN (GLUCOPHAGE XR) 500 MG 24 hr tablet     Hemoglobin A1c    Continue metformin, repeat labs today      12. Mild ascending aorta dilation (H24) - repeat echo 2/2025  I77.810 Echocardiogram Complete      13. Angioedema  T78.3XXA       14. Nausea  R11.0 ondansetron (ZOFRAN ODT) 4 MG ODT tab      15. Intertrigo  L30.4 nystatin (MYCOSTATIN) 644011 UNIT/GM external powder            The longitudinal plan of care for the diagnosis(es)/condition(s) as documented were addressed during this visit. Due to the added complexity in care, I will continue to support Stephanie in the subsequent management and with ongoing continuity of care.       BMI  Estimated body mass index is 44.12 kg/m  as calculated from the following:    Height as of this  "encounter: 1.66 m (5' 5.35\").    Weight as of this encounter: 121.6 kg (268 lb).   Weight management plan: Discussed healthy diet and exercise guidelines    Counseling  Appropriate preventive services were addressed with this patient via screening, questionnaire, or discussion as appropriate for fall prevention, nutrition, physical activity, Tobacco-use cessation, social engagement, weight loss and cognition.  Checklist reviewing preventive services available has been given to the patient.  Reviewed patient's diet, addressing concerns and/or questions.   She is at risk for psychosocial distress and has been provided with information to reduce risk.   The patient was provided with written information regarding signs of hearing loss.   Information on urinary incontinence and treatment options given to patient.   The patient's PHQ-9 score is consistent with mild depression. She was provided with information regarding depression.           Kathryn Brown is a 64 year old, presenting for the following:  Wellness Visit        3/6/2025     9:16 AM   Additional Questions   Roomed by Rox KIDD CMA   Accompanied by self         3/6/2025     9:16 AM   Patient Reported Additional Medications   Patient reports taking the following new medications n/a          HPI    Advance Care Planning  Patient has a Health Care Directive on file  Advance care planning document is on file and is current.      3/5/2025   General Health   How would you rate your overall physical health? (!) FAIR   Feel stress (tense, anxious, or unable to sleep) Very much   (!) STRESS CONCERN      3/5/2025   Nutrition   Diet: Other   If other, please elaborate: I eat what I am not allergic to.         3/5/2025   Exercise   Days per week of moderate/strenous exercise 0 days   Average minutes spent exercising at this level 0 min   (!) EXERCISE CONCERN      3/5/2025   Social Factors   Worry food won't last until get money to buy more No   Food not last or " not have enough money for food? No   Do you have housing? (Housing is defined as stable permanent housing and does not include staying ouside in a car, in a tent, in an abandoned building, in an overnight shelter, or couch-surfing.) Yes   Are you worried about losing your housing? No   Lack of transportation? No   Unable to get utilities (heat,electricity)? No         3/6/2025   Fall Risk   Gait Speed Test (Document in seconds) 4.84   Gait Speed Test Interpretation Less than or equal to 5.00 seconds - PASS          3/5/2025   Activities of Daily Living- Home Safety   Needs help with the following daily activites None of the above   Safety concerns in the home None of the above         3/5/2025   Dental   Dentist two times every year? Yes         3/5/2025   Hearing Screening   Hearing concerns? (!) I NEED TO ASK PEOPLE TO SPEAK UP OR REPEAT THEMSELVES.         3/5/2025   Driving Risk Screening   Patient/family members have concerns about driving No         3/5/2025   General Alertness/Fatigue Screening   Have you been more tired than usual lately? No         3/5/2025   Urinary Incontinence Screening   Bothered by leaking urine in past 6 months Yes         2/12/2024   TB Screening   Were you born outside of the [place]? No       Today's PHQ-9 Score:       3/5/2025    11:54 PM   PHQ-9 SCORE   PHQ-9 Total Score MyChart 8 (Mild depression)   PHQ-9 Total Score 8        Patient-reported         3/5/2025   Substance Use   Alcohol more than 3/day or more than 7/wk No   Do you have a current opioid prescription? No   How severe/bad is pain from 1 to 10? 6/10   Do you use any other substances recreationally? No     Social History     Tobacco Use    Smoking status: Never    Smokeless tobacco: Never   Vaping Use    Vaping status: Never Used   Substance Use Topics    Alcohol use: No     Comment: RARE    Drug use: No           2/10/2025   LAST FHS-7 RESULTS   1st degree relative breast or ovarian cancer Yes   Any relative bilateral  breast cancer No   Any male have breast cancer No   Any ONE woman have BOTH breast AND ovarian cancer No   Any woman with breast cancer before 50yrs No   2 or more relatives with breast AND/OR ovarian cancer No   2 or more relatives with breast AND/OR bowel cancer No       Mammogram Screening - Mammogram every 1-2 years updated in Health Maintenance based on mutual decision making      History of abnormal Pap smear: No - age 65 or older with adequate negative prior screening test results (3 consecutive negative cytology results, 2 consecutive negative cotesting results, or 2 consecutive negative HrHPV test results within 10 years, with the most recent test occurring within the recommended screening interval for the test used)        12/18/2008    12:00 AM 8/7/2006    12:00 AM   PAP / HPV   PAP (Historical) Acellular, No Test (Use with Unsat vials w/o brush)  NIL      ASCVD Risk   The 10-year ASCVD risk score (Jailene COX, et al., 2019) is: 8.5%    Values used to calculate the score:      Age: 64 years      Sex: Female      Is Non- : No      Diabetic: No      Tobacco smoker: No      Systolic Blood Pressure: 124 mmHg      Is BP treated: Yes      HDL Cholesterol: 35 mg/dL      Total Cholesterol: 169 mg/dL      Reviewed and updated as needed this visit by Provider           Sexual Activity            Current providers sharing in care for this patient include:  Patient Care Team:  Nancy Urban MD as PCP - General (Internal Medicine)  Nancy Urban MD as Assigned PCP    The following health maintenance items are reviewed in Epic and correct as of today:  Health Maintenance   Topic Date Due    Pneumococcal Vaccine: 50+ Years (1 of 2 - PCV) Never done    ANNUAL REVIEW OF HM ORDERS  01/18/2024    ASTHMA ACTION PLAN  01/18/2024    INFLUENZA VACCINE (1) 09/01/2024    ASTHMA CONTROL TEST  09/06/2025    BMP  10/13/2025    COLORECTAL CANCER SCREENING  01/20/2026    MAMMO SCREENING   "02/10/2026    MEDICARE ANNUAL WELLNESS VISIT  03/06/2026    FALL RISK ASSESSMENT  03/06/2026    DEXA  02/16/2027    GLUCOSE  10/13/2027    DTAP/TDAP/TD IMMUNIZATION (3 - Td or Tdap) 03/12/2028    LIPID  02/14/2029    ADVANCE CARE PLANNING  03/06/2030    HEPATITIS C SCREENING  Completed    PHQ-2 (once per calendar year)  Completed    HPV IMMUNIZATION  Aged Out    MENINGITIS IMMUNIZATION  Aged Out    PAP  Discontinued    ZOSTER IMMUNIZATION  Discontinued    RSV VACCINE  Discontinued    COVID-19 Vaccine  Discontinued         Nephrolithiasis - passed multiple stones earlier this week.  Had started tumeric supplement - can increase risk of stones - will stop    HTN - BP has been well controlled, tolerating avapro well    Asthma - no recent exacerbations, well controlled on current medications    Severe allergies to many foods - makes healthy eating difficult - CAN eat cheddar cheese, cottage cheese, eggs, hamburger, steak, chocolate, cooked vegetables except legumes, pasta, white bread    Chronic diarrhea - stable - 4 to 5 stools per day on average    Osteopenia - due for next DEXA in 2027    GERD - controlled on OTC PPI dosing    R hip pain - following with Dr Del Angel.   Multiople msk issues related to compensation for low muscle tone.  Works diligently with Audra, her physical therapist at [facility].    Having some trigger finger issues    Noticing great improvement as she has recovered from her breast reduction.     ROS: 10 point ROS neg other than the symptoms noted above in the HPI.       Objective    Exam  /64 (BP Location: Right arm, Patient Position: Sitting)   Pulse 79   Temp 96.9  F (36.1  C) (Temporal)   Resp 16   Ht 1.66 m (5' 5.35\")   Wt 121.6 kg (268 lb)   LMP 06/04/2010   SpO2 97%   BMI 44.12 kg/m     Estimated body mass index is 44.12 kg/m  as calculated from the following:    Height as of this encounter: 1.66 m (5' 5.35\").    Weight as of this encounter: 121.6 kg (268 " lb).  Wt Readings from Last 4 Encounters:   03/06/25 121.6 kg (268 lb)   03/05/24 118.8 kg (261 lb 14.4 oz)   02/14/24 119.7 kg (263 lb 12.8 oz)   05/01/23 124.2 kg (273 lb 14.4 oz)         Physical Exam  GENERAL: alert and no distress  EYES: Eyes grossly normal to inspection, PERRL and conjunctivae and sclerae normal  HENT: ear canals and TM's normal, nose and mouth without ulcers or lesions  NECK: no adenopathy, no asymmetry, masses, or scars  RESP: lungs clear to auscultation - no rales, rhonchi or wheezes  CV: regular rate and rhythm, normal S1 S2, no S3 or S4, no murmur, click or rub, no peripheral edema  ABDOMEN: soft, nontender, no hepatosplenomegaly, no masses and bowel sounds normal  MS: no gross musculoskeletal defects noted, no edema  SKIN: no suspicious lesions or rashes  NEURO: Normal strength and tone, mentation intact and speech normal  PSYCH: mentation appears normal, affect normal/bright        3/6/2025   Mini Cog   Clock Draw Score 2 Normal   3 Item Recall 3 objects recalled   Mini Cog Total Score 5             Signed Electronically by: Nanyc Urban MD

## 2025-03-06 NOTE — LETTER
My Asthma Action Plan    Name: Stephanie Mendez   YOB: 1960  Date: 3/6/2025   My doctor: Nancy Urban MD   My clinic: [facility]        My Control Medicine: Fluticasone propionate + salmeterol (Advair HFA) -  115/21 mcg    My Rescue Medicine: Albuterol (Proair/Ventolin/Proventil HFA) 2-4 puffs EVERY 4 HOURS as needed. Use a spacer if recommended by your provider.   My Asthma Severity:   Mild Persistent  Know your asthma triggers: smoke, upper respiratory infections, and pollens               GREEN ZONE   Good Control  I feel good  No cough or wheeze  Can work, sleep and play without asthma symptoms       Take your asthma control medicine every day.     If exercise triggers your asthma, take your rescue medication  15 minutes before exercise or sports, and  During exercise if you have asthma symptoms  Spacer to use with inhaler: If you have a spacer, make sure to use it with your inhaler             YELLOW ZONE Getting Worse  I have ANY of these:  I do not feel good  Cough or wheeze  Chest feels tight  Wake up at night   Keep taking your Green Zone medications  Start taking your rescue medicine:  every 20 minutes for up to 1 hour. Then every 4 hours for 24-48 hours.  If you stay in the Yellow Zone for more than 12-24 hours, contact your doctor.  If you do not return to the Green Zone in 12-24 hours or you get worse, start taking your oral steroid medicine if prescribed by your provider.           RED ZONE Medical Alert - Get Help  I have ANY of these:  I feel awful  Medicine is not helping  Breathing getting harder  Trouble walking or talking  Nose opens wide to breathe       Take your rescue medicine NOW  If your provider has prescribed an oral steroid medicine, start taking it NOW  Call your doctor NOW  If you are still in the Red Zone after 20 minutes and you have not reached your doctor:  Take your rescue medicine again and  Call 911 or go to the emergency room right  away    See your regular doctor within 2 weeks of an Emergency Room or Urgent Care visit for follow-up treatment.          Annual Reminders:  Meet with Asthma Educator,  Flu Shot in the Fall, consider Pneumonia Vaccination for patients with asthma (aged 19 and older).    Pharmacy: [facility] [place], [place] - [phone]    Electronically signed by Nancy Urban MD   Date: 03/06/25                      Asthma Triggers  How To Control Things That Make Your Asthma Worse    Triggers are things that make your asthma worse.  Look at the list below to help you find your triggers and what you can do about them.  You can help prevent asthma flare-ups by staying away from your triggers.      Trigger                                                          What you can do   Cigarette Smoke  Tobacco smoke can make asthma worse. Do not allow smoking in your home, car or around you.  Be sure no one smokes at a child s day care or school.  If you smoke, ask your health care provider for ways to help you quit.  Ask family members to quit too.  Ask your health care provider for a referral to Quit Plan to help you quit smoking, or call 7-064-609PLAN.     Colds, Flu, Bronchitis  These are common triggers of asthma. Wash your hands often.  Don t touch your eyes, nose or mouth.  Get a flu shot every year.     Dust Mites  These are tiny bugs that live in cloth or carpet. They are too small to see. Wash sheets and blankets in hot water every week.   Encase pillows and mattress in dust mite proof covers.  Avoid having carpet if you can. If you have carpet, vacuum weekly.   Use a dust mask and HEPA vacuum.   Pollen and Outdoor Mold  Some people are allergic to trees, grass, or weed pollen, or molds. Try to keep your windows closed.  Limit time out doors when pollen count is high.   Ask you health care provider about taking medicine during allergy season.     Animal Dander  Some people are allergic to skin  flakes, urine or saliva from pets with fur or feathers. Keep pets with fur or feathers out of your home.    If you can t keep the pet outdoors, then keep the pet out of your bedroom.  Keep the bedroom door closed.  Keep pets off cloth furniture and away from stuffed toys.     Mice, Rats, and Cockroaches   Some people are allergic to the waste from these pests.   Cover food and garbage.  Clean up spills and food crumbs.  Store grease in the refrigerator.   Keep food out of the bedroom.   Indoor Mold  This can be a trigger if your home has high moisture. Fix leaking faucets, pipes, or other sources of water.   Clean moldy surfaces.  Dehumidify basement if it is damp and smelly.   Smoke, Strong Odors, and Sprays  These can reduce air quality. Stay away from strong odors and sprays, such as perfume, powder, hair spray, paints, smoke incense, paint, cleaning products, candles and new carpet.   Exercise or Sports  Some people with asthma have this trigger. Be active!  Ask your doctor about taking medicine before sports or exercise to prevent symptoms.    Warm up for 5-10 minutes before and after sports or exercise.     Other Triggers of Asthma  Cold air:  Cover your nose and mouth with a scarf.  Sometimes laughing or crying can be a trigger.  Some medicines and food can trigger asthma.

## 2025-04-24 ENCOUNTER — HOSPITAL ENCOUNTER (OUTPATIENT)
Dept: CARDIOLOGY | Facility: CLINIC | Age: 65
Discharge: HOME | End: 2025-04-24
Attending: PEDIATRICS
Payer: COMMERCIAL

## 2025-04-24 DIAGNOSIS — I77.810 MILD ASCENDING AORTA DILATION: ICD-10-CM

## 2025-04-24 LAB — LVEF ECHO: NORMAL

## 2025-04-24 PROCEDURE — 93306 TTE W/DOPPLER COMPLETE: CPT

## 2025-04-28 DIAGNOSIS — R93.1 ABNORMAL ECHOCARDIOGRAM: Primary | ICD-10-CM

## 2025-04-28 DIAGNOSIS — I77.810 MILD ASCENDING AORTA DILATION: ICD-10-CM

## 2025-04-28 DIAGNOSIS — I35.0 MILD AORTIC STENOSIS: ICD-10-CM

## 2025-04-28 DIAGNOSIS — R09.89 HYPERDYNAMIC CIRCULATION: ICD-10-CM

## 2025-04-30 ENCOUNTER — PATIENT OUTREACH (OUTPATIENT)
Dept: CARE COORDINATION | Facility: CLINIC | Age: 65
End: 2025-04-30
Payer: COMMERCIAL

## 2025-05-06 ENCOUNTER — OFFICE VISIT (OUTPATIENT)
Dept: CARDIOLOGY | Facility: CLINIC | Age: 65
End: 2025-05-06
Attending: PEDIATRICS
Payer: COMMERCIAL

## 2025-05-06 VITALS
OXYGEN SATURATION: 99 % | HEIGHT: 64 IN | DIASTOLIC BLOOD PRESSURE: 80 MMHG | BODY MASS INDEX: 45.27 KG/M2 | WEIGHT: 265.2 LBS | SYSTOLIC BLOOD PRESSURE: 124 MMHG | HEART RATE: 81 BPM

## 2025-05-06 DIAGNOSIS — I35.0 MILD AORTIC STENOSIS: ICD-10-CM

## 2025-05-06 DIAGNOSIS — R93.1 ABNORMAL ECHOCARDIOGRAM: ICD-10-CM

## 2025-05-06 DIAGNOSIS — I77.810 MILD ASCENDING AORTA DILATION: ICD-10-CM

## 2025-05-06 DIAGNOSIS — R09.89 HYPERDYNAMIC CIRCULATION: ICD-10-CM

## 2025-05-06 PROCEDURE — 93000 ELECTROCARDIOGRAM COMPLETE: CPT | Performed by: INTERNAL MEDICINE

## 2025-05-06 PROCEDURE — 3074F SYST BP LT 130 MM HG: CPT | Performed by: INTERNAL MEDICINE

## 2025-05-06 PROCEDURE — 99203 OFFICE O/P NEW LOW 30 MIN: CPT | Performed by: INTERNAL MEDICINE

## 2025-05-06 PROCEDURE — 3079F DIAST BP 80-89 MM HG: CPT | Performed by: INTERNAL MEDICINE

## 2025-05-06 NOTE — PROGRESS NOTES
HPI and Plan:   Ms Mendez is a very pleasant 64-year-old female who is here for evaluation of abnormal echocardiogram.  Recent echocardiogram showed normal LV function mild aortic valve stenosis and ascending out of 4 cm.  To be noted patient was seen several years ago in cardiology clinic by Dr. Hillman because of symptomatic PVCs but they got resolved after she discontinued one of the nutritional supplement.  EKG today shows sinus rhythm.  She denies any exertional chest discomfort shortness of breath dizziness presyncope syncope or palpitations.  Patient tells me that she has allergic reaction to several things leading to angioedema and urticaria and has been found to be diagnosed with an autoimmune disorder and now is using Xolair which has resolved the symptoms.  She also has history of hypertension and is on irbesartan.    Assessment and plan  Mild aortic valve stenosis.  Discussed in detail with patient and her  natural history of aortic valve stenosis indication for elective aortic valve replacement.  Ascending order dilatation measuring 4 cm.  Prior history of symptomatic PVCs.  This has resolved.  No more palpitations.  History of angioedema urticaria and significant adverse reaction to several medications.  Patient follows allergy and tells me that ever since she has been on Xolair her symptoms are much better controlled.    Recommendations  Follow-up in 1 year with a repeat echocardiogram and if aortic stenosis and ascending aorta is stable on repeat echocardiogram for the follow-up may happen every 2 years.      Orders Placed This Encounter   Procedures    Follow-Up with Cardiology    EKG 12-lead complete w/read - Clinics (performed today)    Echocardiogram Complete       No orders of the defined types were placed in this encounter.      There are no discontinued medications.      Encounter Diagnoses   Name Primary?    Abnormal echocardiogram     Mild ascending aorta dilation (H24) - repeat echo  2/2025     Mild aortic stenosis     Hyperdynamic circulation        CURRENT MEDICATIONS:  Current Outpatient Medications   Medication Sig Dispense Refill    ADVAIR -21 MCG/ACT inhaler       albuterol (PROVENTIL) (2.5 MG/3ML) 0.083% neb solution Take 1 vial (2.5 mg) by nebulization every 4 hours as needed for shortness of breath or wheezing. 90 mL 3    ALBUTEROL 90 MCG/ACT IN AERS 1-2 puffs Q 4-6 hrs prn 1 1 year    CENTRUM OR TABS 1 TABLET DAILY      diphenhydrAMINE (BENADRYL) 25 MG capsule Take 25 mg by mouth every 6 hours as needed.      DoxePIN (SINEQUAN) 10 MG capsule Can take 2 three times a day if neaded      EPI E-Z PEN REMEDIOS 1:1000  IJ 1 TIME ONLY 2 3    esomeprazole (NEXIUM) 20 MG DR capsule Take 1 capsule (20 mg) by mouth every morning (before breakfast) Take 30-60 minutes before eating. 90 capsule 0    fexofenadine (ALLEGRA) 180 MG tablet Take 1 tablet (180 mg) by mouth daily 90 tablet 0    irbesartan (AVAPRO) 150 MG tablet Take 1 tablet (150 mg) by mouth at bedtime. 90 tablet 3    IRON RELEASE TBCR 160 MG OR 1 TABLET DAILY      metFORMIN (GLUCOPHAGE XR) 500 MG 24 hr tablet Take 1 tablet (500 mg) by mouth daily (with dinner). 90 tablet 5    metroNIDAZOLE (METROGEL) 0.75 % vaginal gel Place 1 applicator (5 g) vaginally daily 35 g 11    montelukast (SINGULAIR) 10 MG tablet Take 10 mg by mouth daily       nystatin (MYCOSTATIN) 392341 UNIT/GM external powder Apply topically 3 times daily. 60 g 3    olopatadine (PATANOL) 0.1 % ophthalmic solution Place 2 drops into both eyes daily as needed       Omalizumab (XOLAIR SC) Twice per month      ondansetron (ZOFRAN ODT) 4 MG ODT tab Take 1 tablet (4 mg) by mouth every 6 hours as needed for nausea. 20 tablet 11    potassium citrate 15 MEQ (1620 MG) TBCR Take 15 mEq by mouth daily. 30 tablet 5    Vitamin D, Cholecalciferol, 1000 UNITS TABS Take by mouth daily       Vitamin D3 (VITAMIN D) 10 MCG (400 UNIT) tablet Take 1 tablet by mouth daily      zinc 50 MG TABS  Take 50 mg by mouth daily         ALLERGIES     Allergies   Allergen Reactions    Amlodipine Besylate-Valsartan Anaphylaxis    Codeine Angioedema and Hives    Ketorolac Tromethamine Anaphylaxis    Polymyxin B-Trimethoprim Anaphylaxis and Angioedema    Tetracaine Hcl Anaphylaxis    Adhesive Tape Hives and Other (See Comments)     Had hives/large welts underneath strips    Steri Strips Hives and Blisters     Had hives/large welts underneath strips     Amlodipine Hives    Chicken Allergy      hives    Dust Mites      runny, stuffy nose. Gets fungal sinus inf.    Food      FRESH VEGETABLES    Fruit Extracts      Cannot eat raw fruits and vegetables but can eat them cooked    Lactose Intolerance (Gi)      intolerant    Latex Hives    Mold      same as dust    Onion Hives    Other [No Clinical Screening - See Comments] Hives     BASIL    Oxycodone Difficulty breathing     Relieved with benadryl 50mg    Oxycodone-Acetaminophen Other (See Comments)    Propofol GI Disturbance    Etna Oil Hives     OR SEEDS    Trees      same as dust    Glucosamine Complex [Glucosamine] Palpitations     Aggravates frequent PVC       PAST MEDICAL HISTORY:  Past Medical History:   Diagnosis Date    Allergic rhinitis, cause unspecified     Allergy, food     Anemia, unspecified     Arthritis 2014    toe and 2016 slight in knee    Benign essential hypertension 02/14/2024    Cancer (H) 2005    basil cell carsinoma    Chronic idiopathic urticaria     Chronic maxillary sinusitis     recurrent fungal infection (remote)    DEPRESSION     Esophageal reflux     Irregular heart beat     Mild intermittent asthma     Murmur, heart     12/1/2015 3/6 systolic murmur heard - echo ordered    Osteopenia of lumbar spine 02/17/2024    Personal history of other malignant neoplasm of skin     Basal cell    PONV (postoperative nausea and vomiting)     PVC (premature ventricular contraction) 12/2015 12/2015 Holter - SR (HR ) with PVC's and SVPB's        PAST SURGICAL HISTORY:  Past Surgical History:   Procedure Laterality Date    APPENDECTOMY      BIOPSY      COLONOSCOPY  1988    subtotal colectomy without bag    ENT SURGERY  1986    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)    GI SURGERY      HC ABLATION, ENDOMETRIAL, THERMAL, W/O HYSTEROSCOPIC GUIDANCE  07/2010    HEAD & NECK SURGERY      HEPATECTOMY PARTIAL Left 02/16/2016    Procedure: HEPATECTOMY PARTIAL;  Surgeon: Kevin Howard MD;  Location: [facility] OR    HERNIA REPAIR  2014    HYSTERECTOMY, PAP NO LONGER INDICATED  08/2010    LAPAROSCOPIC HEPATECTOMY PARTIAL Left 02/16/2016    Procedure: LAPAROSCOPIC HEPATECTOMY PARTIAL;  Surgeon: Kevin Howard MD;  Location: [facility] OR    LAPAROSCOPY DIAGNOSTIC (GENERAL) N/A 02/16/2016    Procedure: LAPAROSCOPY DIAGNOSTIC (GENERAL);  Surgeon: Kevin Howard MD;  Location: [facility] OR    MAMMOPLASTY REDUCTION BILATERAL Bilateral 5/1/2023    Procedure: BILATERAL REDUCTION MAMMOPLASTY, WITH FREE NIPPLE GRAFTS;  Surgeon: Ottoniel Garcia MD;  Location: [facility] OR    RECONSTRUCT ABDOMINAL WALL  10/23/2012    Procedure: RECONSTRUCT ABDOMINAL WALL;  ABDOMINAL WALL RECONSTRUCTION WITH MESH ;  Surgeon: Romi Renteria MD;  Location: [facility] OR    SALPINGO OOPHORECTOMY,R/L/FAUSTINA      Salpingo Oophorectomy, RT/LT/FAUSTINA    SURGICAL HISTORY OF -       partial colectomy (3/4 colon)    ZZC NONSPECIFIC PROCEDURE  1998    hemorrhoidectomy    ZZC NONSPECIFIC PROCEDURE  1984    salivary gland resection rt side    ZZC NONSPECIFIC PROCEDURE  1998    hemorrhoidectomy    ZZC NONSPECIFIC PROCEDURE  2000    sinus surgery       FAMILY HISTORY:  Family History   Problem Relation Age of Onset    Diabetes Mother     Hypertension Mother     Lipids Mother     Breast Cancer Mother         twice    Cancer Mother 82        DX this year, 2015- breast cancer    Alzheimer Disease Mother     Anesthesia Reaction Mother     Obesity Mother     Cancer Father         Skin cancer - BCC    Anxiety Disorder Father      Osteoporosis Father     Lymphoma Father     Hypothyroidism Father     Parkinsonism Father     Other Cancer Father         NON-HOGKINS Lymphoma    Thyroid Disease Father         HYPO    Asthma Father     Cardiovascular Maternal Grandmother         CHF    Diabetes Maternal Grandmother     Osteoporosis Maternal Grandmother     Cerebrovascular Disease Paternal Grandmother     Anesthesia Reaction Son     Obesity Son     Asthma Son     Obesity Son     Hypertension Son     Diabetes Maternal Aunt     Cancer - colorectal Maternal Aunt     Anesthesia Reaction Other     Obesity Other     Asthma Other     C.A.D. No family hx of        SOCIAL HISTORY:  Social History     Socioeconomic History    Marital status:      Spouse name: Ritesh    Number of children: 2    Years of education: 14    Highest education level: Some college, no degree   Occupational History    Occupation: Homemaker   Tobacco Use    Smoking status: Never    Smokeless tobacco: Never   Vaping Use    Vaping status: Never Used   Substance and Sexual Activity    Alcohol use: No     Comment: RARE    Drug use: No    Sexual activity: Yes     Partners: Male     Birth control/protection: Female Surgical     Comment:  had vasectomy   Other Topics Concern    Parent/sibling w/ CABG, MI or angioplasty before 65F 55M? No    Caffeine Concern No    Sleep Concern No    Special Diet Yes     Comment: allergy related     Exercise No     Comment: some walking     Seat Belt Yes   Social History Narrative    .  Lives with .  2 sons, both with autism.  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      Social Drivers of Health     Financial Resource Strain: Low Risk  (3/5/2025)    Financial Resource Strain     Within the past 12 months, have you or your family members you live with been unable to get utilities (heat, electricity) when it was really needed?: No   Food Insecurity: Low Risk  (3/5/2025)    Food Insecurity     Within the past 12 months, did you worry that  your food would run out before you got money to buy more?: No     Within the past 12 months, did the food you bought just not last and you didn t have money to get more?: No   Transportation Needs: Low Risk  (3/5/2025)    Transportation Needs     Within the past 12 months, has lack of transportation kept you from medical appointments, getting your medicines, non-medical meetings or appointments, work, or from getting things that you need?: No   Physical Activity: Inactive (3/5/2025)    Exercise Vital Sign     Days of Exercise per Week: 0 days     Minutes of Exercise per Session: 0 min   Stress: Stress Concern Present (3/5/2025)    [employer] - Occupational Stress Questionnaire     Feeling of Stress : Very much   Social Connections: Unknown (2/12/2024)    Social Connection and Isolation Panel [NHANES]     Frequency of Social Gatherings with Friends and Family: More than three times a week   Interpersonal Safety: Low Risk  (2/14/2024)    Interpersonal Safety     Do you feel physically and emotionally safe where you currently live?: Yes     Within the past 12 months, have you been hit, slapped, kicked or otherwise physically hurt by someone?: No     Within the past 12 months, have you been humiliated or emotionally abused in other ways by your partner or ex-partner?: No   Housing Stability: Low Risk  (3/5/2025)    Housing Stability     Do you have housing? : Yes     Are you worried about losing your housing?: No       Review of Systems:  Skin:  not assessed       Eyes:  Positive for glasses    ENT:  not assessed      Respiratory:  Positive for   Hx of asthma   Cardiovascular:    edema, Positive for ankles  Gastroenterology: not assessed      Genitourinary:  not assessed      Musculoskeletal:  not assessed      Neurologic:  not assessed      Psychiatric:  not assessed      Heme/Lymph/Imm:  not assessed      Endocrine:  not assessed        Physical Exam:  Vitals: /80   Pulse 81   Ht  "1.613 m (5' 3.5\")   Wt 120.3 kg (265 lb 3.2 oz)   LMP 06/04/2010   SpO2 99%   BMI 46.24 kg/m      General Patient appears comfortable  Neck normal JVP  Cardiovascular system grade 2 x 6 ejection systolic murmur with early systolic peaking, no S3-S4 rub or gallop  Respiratory system clear to auscultation  Extremities no edema    CC  Nancy Urban MD  [address] [place] [address]                    "

## 2025-05-06 NOTE — LETTER
5/6/2025    Nancy Urban MD  [address] [place] [address]    RE: Stephanie Mendez       Dear Colleague,     I had the pleasure of seeing Stephanie Mendez in the [facility].  HPI and Plan:   Ms Mendez is a very pleasant 64-year-old female who is here for evaluation of abnormal echocardiogram.  Recent echocardiogram showed normal LV function mild aortic valve stenosis and ascending out of 4 cm.  To be noted patient was seen several years ago in cardiology clinic by Dr. Hillman because of symptomatic PVCs but they got resolved after she discontinued one of the nutritional supplement.  EKG today shows sinus rhythm.  She denies any exertional chest discomfort shortness of breath dizziness presyncope syncope or palpitations.  Patient tells me that she has allergic reaction to several things leading to angioedema and urticaria and has been found to be diagnosed with an autoimmune disorder and now is using Xolair which has resolved the symptoms.  She also has history of hypertension and is on irbesartan.    Assessment and plan  Mild aortic valve stenosis.  Discussed in detail with patient and her  natural history of aortic valve stenosis indication for elective aortic valve replacement.  Ascending order dilatation measuring 4 cm.  Prior history of symptomatic PVCs.  This has resolved.  No more palpitations.  History of angioedema urticaria and significant adverse reaction to several medications.  Patient follows allergy and tells me that ever since she has been on Xolair her symptoms are much better controlled.    Recommendations  Follow-up in 1 year with a repeat echocardiogram and if aortic stenosis and ascending aorta is stable on repeat echocardiogram for the follow-up may happen every 2 years.      Orders Placed This Encounter   Procedures     Follow-Up with Cardiology     EKG 12-lead complete w/read - Clinics (performed today)     Echocardiogram Complete       No  orders of the defined types were placed in this encounter.      There are no discontinued medications.      Encounter Diagnoses   Name Primary?     Abnormal echocardiogram      Mild ascending aorta dilation (H24) - repeat echo 2/2025      Mild aortic stenosis      Hyperdynamic circulation        CURRENT MEDICATIONS:  Current Outpatient Medications   Medication Sig Dispense Refill     ADVAIR -21 MCG/ACT inhaler        albuterol (PROVENTIL) (2.5 MG/3ML) 0.083% neb solution Take 1 vial (2.5 mg) by nebulization every 4 hours as needed for shortness of breath or wheezing. 90 mL 3     ALBUTEROL 90 MCG/ACT IN AERS 1-2 puffs Q 4-6 hrs prn 1 1 year     CENTRUM OR TABS 1 TABLET DAILY       diphenhydrAMINE (BENADRYL) 25 MG capsule Take 25 mg by mouth every 6 hours as needed.       DoxePIN (SINEQUAN) 10 MG capsule Can take 2 three times a day if neaded       EPI E-Z PEN REMEDIOS 1:1000  IJ 1 TIME ONLY 2 3     esomeprazole (NEXIUM) 20 MG DR capsule Take 1 capsule (20 mg) by mouth every morning (before breakfast) Take 30-60 minutes before eating. 90 capsule 0     fexofenadine (ALLEGRA) 180 MG tablet Take 1 tablet (180 mg) by mouth daily 90 tablet 0     irbesartan (AVAPRO) 150 MG tablet Take 1 tablet (150 mg) by mouth at bedtime. 90 tablet 3     IRON RELEASE TBCR 160 MG OR 1 TABLET DAILY       metFORMIN (GLUCOPHAGE XR) 500 MG 24 hr tablet Take 1 tablet (500 mg) by mouth daily (with dinner). 90 tablet 5     metroNIDAZOLE (METROGEL) 0.75 % vaginal gel Place 1 applicator (5 g) vaginally daily 35 g 11     montelukast (SINGULAIR) 10 MG tablet Take 10 mg by mouth daily        nystatin (MYCOSTATIN) 290286 UNIT/GM external powder Apply topically 3 times daily. 60 g 3     olopatadine (PATANOL) 0.1 % ophthalmic solution Place 2 drops into both eyes daily as needed        Omalizumab (XOLAIR SC) Twice per month       ondansetron (ZOFRAN ODT) 4 MG ODT tab Take 1 tablet (4 mg) by mouth every 6 hours as needed for nausea. 20 tablet 11      potassium citrate 15 MEQ (1620 MG) TBCR Take 15 mEq by mouth daily. 30 tablet 5     Vitamin D, Cholecalciferol, 1000 UNITS TABS Take by mouth daily        Vitamin D3 (VITAMIN D) 10 MCG (400 UNIT) tablet Take 1 tablet by mouth daily       zinc 50 MG TABS Take 50 mg by mouth daily         ALLERGIES     Allergies   Allergen Reactions     Amlodipine Besylate-Valsartan Anaphylaxis     Codeine Angioedema and Hives     Ketorolac Tromethamine Anaphylaxis     Polymyxin B-Trimethoprim Anaphylaxis and Angioedema     Tetracaine Hcl Anaphylaxis     Adhesive Tape Hives and Other (See Comments)     Had hives/large welts underneath strips     Steri Strips Hives and Blisters     Had hives/large welts underneath strips      Amlodipine Hives     Chicken Allergy      hives     Dust Mites      runny, stuffy nose. Gets fungal sinus inf.     Food      FRESH VEGETABLES     Fruit Extracts      Cannot eat raw fruits and vegetables but can eat them cooked     Lactose Intolerance (Gi)      intolerant     Latex Hives     Mold      same as dust     Onion Hives     Other [No Clinical Screening - See Comments] Hives     BASIL     Oxycodone Difficulty breathing     Relieved with benadryl 50mg     Oxycodone-Acetaminophen Other (See Comments)     Propofol GI Disturbance     [place] Oil Hives     OR SEEDS     Trees      same as dust     Glucosamine Complex [Glucosamine] Palpitations     Aggravates frequent PVC       PAST MEDICAL HISTORY:  Past Medical History:   Diagnosis Date     Allergic rhinitis, cause unspecified      Allergy, food      Anemia, unspecified      Arthritis 2014    toe and 2016 slight in knee     Benign essential hypertension 02/14/2024     Cancer (H) 2005    basil cell carsinoma     Chronic idiopathic urticaria      Chronic maxillary sinusitis     recurrent fungal infection (remote)     DEPRESSION      Esophageal reflux      Irregular heart beat      Mild intermittent asthma      Murmur, heart     12/1/2015 3/6 systolic murmur  heard - echo ordered     Osteopenia of lumbar spine 02/17/2024     Personal history of other malignant neoplasm of skin     Basal cell     PONV (postoperative nausea and vomiting)      PVC (premature ventricular contraction) 12/2015 12/2015 Holter - SR (HR ) with PVC's and SVPB's       PAST SURGICAL HISTORY:  Past Surgical History:   Procedure Laterality Date     APPENDECTOMY       BIOPSY       COLONOSCOPY  1988    subtotal colectomy without bag     ENT SURGERY  1986    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)     GI SURGERY       HC ABLATION, ENDOMETRIAL, THERMAL, W/O HYSTEROSCOPIC GUIDANCE  07/2010     HEAD & NECK SURGERY       HEPATECTOMY PARTIAL Left 02/16/2016    Procedure: HEPATECTOMY PARTIAL;  Surgeon: Kevin Howard MD;  Location: [facility] OR     HERNIA REPAIR  2014     HYSTERECTOMY, PAP NO LONGER INDICATED  08/2010     LAPAROSCOPIC HEPATECTOMY PARTIAL Left 02/16/2016    Procedure: LAPAROSCOPIC HEPATECTOMY PARTIAL;  Surgeon: Kevin Howard MD;  Location: [facility] OR     LAPAROSCOPY DIAGNOSTIC (GENERAL) N/A 02/16/2016    Procedure: LAPAROSCOPY DIAGNOSTIC (GENERAL);  Surgeon: Kevin Howard MD;  Location: [facility] OR     MAMMOPLASTY REDUCTION BILATERAL Bilateral 5/1/2023    Procedure: BILATERAL REDUCTION MAMMOPLASTY, WITH FREE NIPPLE GRAFTS;  Surgeon: Ottoniel Garcia MD;  Location: [facility] OR     RECONSTRUCT ABDOMINAL WALL  10/23/2012    Procedure: RECONSTRUCT ABDOMINAL WALL;  ABDOMINAL WALL RECONSTRUCTION WITH MESH ;  Surgeon: Romi Renteria MD;  Location: [facility] OR     SALPINGO OOPHORECTOMY,R/L/FAUSTINA      Salpingo Oophorectomy, RT/LT/FAUSTINA     SURGICAL HISTORY OF -       partial colectomy (3/4 colon)     [facility] NONSPECIFIC PROCEDURE  1998    hemorrhoidectomy     [facility] NONSPECIFIC PROCEDURE  1984    salivary gland resection rt side     [facility] NONSPECIFIC PROCEDURE  1998    hemorrhoidectomy     [facility] NONSPECIFIC PROCEDURE  2000    sinus surgery       FAMILY HISTORY:  Family History   Problem Relation Age of Onset      Diabetes Mother      Hypertension Mother      Lipids Mother      Breast Cancer Mother         twice     Cancer Mother 82        DX this year, 2015- breast cancer     Alzheimer Disease Mother      Anesthesia Reaction Mother      Obesity Mother      Cancer Father         Skin cancer - BCC     Anxiety Disorder Father      Osteoporosis Father      Lymphoma Father      Hypothyroidism Father      Parkinsonism Father      Other Cancer Father         NON-HOGKINS Lymphoma     Thyroid Disease Father         HYPO     Asthma Father      Cardiovascular Maternal Grandmother         CHF     Diabetes Maternal Grandmother      Osteoporosis Maternal Grandmother      Cerebrovascular Disease Paternal Grandmother      Anesthesia Reaction Son      Obesity Son      Asthma Son      Obesity Son      Hypertension Son      Diabetes Maternal Aunt      Cancer - colorectal Maternal Aunt      Anesthesia Reaction Other      Obesity Other      Asthma Other      C.A.D. No family hx of        SOCIAL HISTORY:  Social History     Socioeconomic History     Marital status:      Spouse name: Ritesh     Number of children: 2     Years of education: 14     Highest education level: Some college, no degree   Occupational History     Occupation: Homemaker   Tobacco Use     Smoking status: Never     Smokeless tobacco: Never   Vaping Use     Vaping status: Never Used   Substance and Sexual Activity     Alcohol use: No     Comment: RARE     Drug use: No     Sexual activity: Yes     Partners: Male     Birth control/protection: Female Surgical     Comment:  had vasectomy   Other Topics Concern     Parent/sibling w/ CABG, MI or angioplasty before 65F 55M? No     Caffeine Concern No     Sleep Concern No     Special Diet Yes     Comment: allergy related      Exercise No     Comment: some walking      Seat Belt Yes   Social History Narrative    .  Lives with .  2 sons, both with autism.  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      Social Drivers  of Health     Financial Resource Strain: Low Risk  (3/5/2025)    Financial Resource Strain      Within the past 12 months, have you or your family members you live with been unable to get utilities (heat, electricity) when it was really needed?: No   Food Insecurity: Low Risk  (3/5/2025)    Food Insecurity      Within the past 12 months, did you worry that your food would run out before you got money to buy more?: No      Within the past 12 months, did the food you bought just not last and you didn t have money to get more?: No   Transportation Needs: Low Risk  (3/5/2025)    Transportation Needs      Within the past 12 months, has lack of transportation kept you from medical appointments, getting your medicines, non-medical meetings or appointments, work, or from getting things that you need?: No   Physical Activity: Inactive (3/5/2025)    Exercise Vital Sign      Days of Exercise per Week: 0 days      Minutes of Exercise per Session: 0 min   Stress: Stress Concern Present (3/5/2025)    [employer] - Occupational Stress Questionnaire      Feeling of Stress : Very much   Social Connections: Unknown (2/12/2024)    Social Connection and Isolation Panel [NHANES]      Frequency of Social Gatherings with Friends and Family: More than three times a week   Interpersonal Safety: Low Risk  (2/14/2024)    Interpersonal Safety      Do you feel physically and emotionally safe where you currently live?: Yes      Within the past 12 months, have you been hit, slapped, kicked or otherwise physically hurt by someone?: No      Within the past 12 months, have you been humiliated or emotionally abused in other ways by your partner or ex-partner?: No   Housing Stability: Low Risk  (3/5/2025)    Housing Stability      Do you have housing? : Yes      Are you worried about losing your housing?: No       Review of Systems:  Skin:  not assessed       Eyes:  Positive for glasses    ENT:  not assessed     "  Respiratory:  Positive for   Hx of asthma   Cardiovascular:    edema, Positive for ankles  Gastroenterology: not assessed      Genitourinary:  not assessed      Musculoskeletal:  not assessed      Neurologic:  not assessed      Psychiatric:  not assessed      Heme/Lymph/Imm:  not assessed      Endocrine:  not assessed        Physical Exam:  Vitals: /80   Pulse 81   Ht 1.613 m (5' 3.5\")   Wt 120.3 kg (265 lb 3.2 oz)   LMP 06/04/2010   SpO2 99%   BMI 46.24 kg/m      General Patient appears comfortable  Neck normal JVP  Cardiovascular system grade 2 x 6 ejection systolic murmur with early systolic peaking, no S3-S4 rub or gallop  Respiratory system clear to auscultation  Extremities no edema    CC  Nancy Urban MD  [address] [place] [address] [place] [id number]                      Thank you for allowing me to participate in the care of your patient.      Sincerely,     Uri Pelayo MD     [facility]  cc:   Nancy Urban MD  [address] [place] [address] [place] [id number]      "

## 2025-06-02 DIAGNOSIS — N20.0 URIC ACID KIDNEY STONE: ICD-10-CM

## 2025-06-02 RX ORDER — POTASSIUM CITRATE 15 MEQ/1
1 TABLET, EXTENDED RELEASE ORAL DAILY
Qty: 30 TABLET | Refills: 11 | Status: SHIPPED | OUTPATIENT
Start: 2025-06-02

## 2025-07-19 ENCOUNTER — LAB (OUTPATIENT)
Dept: LAB | Facility: CLINIC | Age: 65
End: 2025-07-19
Payer: COMMERCIAL

## 2025-07-19 DIAGNOSIS — N30.00 ACUTE CYSTITIS WITHOUT HEMATURIA: ICD-10-CM

## 2025-07-19 LAB
ALBUMIN UR-MCNC: NEGATIVE MG/DL
APPEARANCE UR: CLEAR
BILIRUB UR QL STRIP: NEGATIVE
COLOR UR AUTO: YELLOW
GLUCOSE UR STRIP-MCNC: NEGATIVE MG/DL
HGB UR QL STRIP: ABNORMAL
KETONES UR STRIP-MCNC: NEGATIVE MG/DL
LEUKOCYTE ESTERASE UR QL STRIP: ABNORMAL
MUCOUS THREADS #/AREA URNS LPF: PRESENT /LPF
NITRATE UR QL: NEGATIVE
PH UR STRIP: 5.5 [PH] (ref 5–7)
RBC #/AREA URNS AUTO: ABNORMAL /HPF
SP GR UR STRIP: <=1.005 (ref 1–1.03)
SQUAMOUS #/AREA URNS AUTO: ABNORMAL /LPF
UROBILINOGEN UR STRIP-ACNC: 0.2 E.U./DL
WBC #/AREA URNS AUTO: ABNORMAL /HPF

## 2025-07-19 PROCEDURE — 81001 URINALYSIS AUTO W/SCOPE: CPT

## (undated) DEVICE — PREP SKIN SCRUB TRAY 4461A

## (undated) DEVICE — LINEN TOWEL PACK X5 5464

## (undated) DEVICE — SOL WATER IRRIG 1000ML BOTTLE 2F7114

## (undated) DEVICE — SU MONOCRYL 4-0 PS-2 18" UND Y496G

## (undated) DEVICE — TUBING INFUSION INFILTRATION LIPOSUCTION 156" 24-6008

## (undated) DEVICE — SU VICRYL 3-0 SH 27" J316H

## (undated) DEVICE — PAD CHUX UNDERPAD 23X24" 7136

## (undated) DEVICE — SU SILK 3-0 SH CR 8X18" C013D

## (undated) DEVICE — SYR 03ML LL W/O NDL 309657

## (undated) DEVICE — SU SILK 2-0 FS-1 18" 685G

## (undated) DEVICE — ESU PENCIL W/SMOKE EVAC NEPTUNE STRYKER 0703-046-000

## (undated) DEVICE — DRAIN JACKSON PRATT RESERVOIR 100ML SU130-1305

## (undated) DEVICE — DRAPE BREAST/CHEST 29420

## (undated) DEVICE — DRAIN JACKSON PRATT 15FR ROUND SIL LF JP-2229

## (undated) DEVICE — NDL SPINAL 22GA 3.5" QUINCKE 405181

## (undated) DEVICE — SU MONOCRYL 3-0 PS-2 27" Y427H

## (undated) DEVICE — GLOVE BIOGEL PI SZ 7.5 40875

## (undated) DEVICE — PIN SAFTY INF 1.5" STERILE SS C18700-020

## (undated) DEVICE — BNDG ELASTIC 6" DBL LENGTH UNSTERILE 6611-16

## (undated) DEVICE — PACK MAJOR SBA15MAFSI

## (undated) DEVICE — PEN MARKING SKIN W/LABELS 31145884

## (undated) DEVICE — ADH LIQUID MASTISOL TOPICAL VIAL 2-3ML 0523-48

## (undated) DEVICE — BLADE KNIFE SURG 15 371115

## (undated) DEVICE — SU ETHICON STRATAFIX SPR PS-2 3-0 30X30CM SXMP2B408

## (undated) DEVICE — DRSG KERLIX 4 1/2"X4YDS ROLL 6715

## (undated) DEVICE — SU PDS II 2-0 CT-1 27" Z339H

## (undated) DEVICE — BLADE KNIFE SURG 10 371110

## (undated) DEVICE — SU PLAIN 5-0 FS-2 27" H820G

## (undated) DEVICE — DRSG DRAIN 4X4" 7086

## (undated) DEVICE — DRAPE SHEET REV FOLD 3/4 9349

## (undated) RX ORDER — ONDANSETRON 2 MG/ML
INJECTION INTRAMUSCULAR; INTRAVENOUS
Status: DISPENSED
Start: 2023-05-01

## (undated) RX ORDER — SCOLOPAMINE TRANSDERMAL SYSTEM 1 MG/1
PATCH, EXTENDED RELEASE TRANSDERMAL
Status: DISPENSED
Start: 2023-05-01

## (undated) RX ORDER — HYDROCODONE BITARTRATE AND ACETAMINOPHEN 5; 325 MG/1; MG/1
TABLET ORAL
Status: DISPENSED
Start: 2023-05-01

## (undated) RX ORDER — PROPOFOL 10 MG/ML
INJECTION, EMULSION INTRAVENOUS
Status: DISPENSED
Start: 2023-05-01

## (undated) RX ORDER — FENTANYL CITRATE 0.05 MG/ML
INJECTION, SOLUTION INTRAMUSCULAR; INTRAVENOUS
Status: DISPENSED
Start: 2023-05-01

## (undated) RX ORDER — GLYCOPYRROLATE 0.2 MG/ML
INJECTION, SOLUTION INTRAMUSCULAR; INTRAVENOUS
Status: DISPENSED
Start: 2023-05-01

## (undated) RX ORDER — CEFAZOLIN SODIUM/WATER 3 G/30 ML
SYRINGE (ML) INTRAVENOUS
Status: DISPENSED
Start: 2023-05-01

## (undated) RX ORDER — FENTANYL CITRATE 50 UG/ML
INJECTION, SOLUTION INTRAMUSCULAR; INTRAVENOUS
Status: DISPENSED
Start: 2023-05-01

## (undated) RX ORDER — GABAPENTIN 300 MG/1
CAPSULE ORAL
Status: DISPENSED
Start: 2023-05-01

## (undated) RX ORDER — GINSENG 100 MG
CAPSULE ORAL
Status: DISPENSED
Start: 2023-05-01

## (undated) RX ORDER — HYDROMORPHONE HYDROCHLORIDE 1 MG/ML
INJECTION, SOLUTION INTRAMUSCULAR; INTRAVENOUS; SUBCUTANEOUS
Status: DISPENSED
Start: 2023-05-01